# Patient Record
Sex: FEMALE | Race: WHITE | NOT HISPANIC OR LATINO | Employment: OTHER | URBAN - METROPOLITAN AREA
[De-identification: names, ages, dates, MRNs, and addresses within clinical notes are randomized per-mention and may not be internally consistent; named-entity substitution may affect disease eponyms.]

---

## 2017-02-01 ENCOUNTER — OFFICE VISIT (OUTPATIENT)
Dept: INTERNAL MEDICINE | Facility: CLINIC | Age: 77
End: 2017-02-01
Payer: COMMERCIAL

## 2017-02-01 VITALS
OXYGEN SATURATION: 97 % | DIASTOLIC BLOOD PRESSURE: 70 MMHG | TEMPERATURE: 96.7 F | SYSTOLIC BLOOD PRESSURE: 130 MMHG | BODY MASS INDEX: 27.22 KG/M2 | HEART RATE: 95 BPM | WEIGHT: 144 LBS

## 2017-02-01 DIAGNOSIS — Z01.818 PREOP GENERAL PHYSICAL EXAM: Primary | ICD-10-CM

## 2017-02-01 PROCEDURE — 99214 OFFICE O/P EST MOD 30 MIN: CPT | Performed by: INTERNAL MEDICINE

## 2017-02-01 PROCEDURE — 36415 COLL VENOUS BLD VENIPUNCTURE: CPT | Performed by: INTERNAL MEDICINE

## 2017-02-01 PROCEDURE — 84132 ASSAY OF SERUM POTASSIUM: CPT | Performed by: INTERNAL MEDICINE

## 2017-02-01 NOTE — PROGRESS NOTES
[place] [address]  [facility] [address]  [phone]  Dept: [phone]    PRE-OP EVALUATION:  Today's date: 2017    Sophia Olivarez (: 1940) presents for pre-operative evaluation assessment as requested by Dr. [place] Coyne.  She requires evaluation and anesthesia risk assessment prior to undergoing surgery/procedure for treatment of right eye .  Proposed procedure: Rt eye Yag Lasik capsulotomy     Date of Surgery/ Procedure: 17  Time of Surgery/ Procedure:   Hospital/Surgical Facility: [facility]  Fax number for surgical facility: [phone]  Primary Physician: Monica Sultana  Type of Anesthesia Anticipated: Local with MAC    Patient has a Health Care Directive or Living Will:  YES     1. NO - Do you have a history of heart attack, stroke, stent, bypass or surgery on an artery in the head, neck, heart or legs?  2. NO - Do you ever have any pain or discomfort in your chest?  3. NO - Do you have a history of  Heart Failure?  4. NO - Are you troubled by shortness of breath when: walking on the level, up a slight hill or at night?  5. NO - Do you currently have a cold, bronchitis or other respiratory infection?  6. NO - Do you have a cough, shortness of breath or wheezing?  7. NO - Do you sometimes get pains in the calves of your legs when you walk?  8. NO - Do you or anyone in your family have previous history of blood clots?  9. NO - Do you or does anyone in your family have a serious bleeding problem such as prolonged bleeding following surgeries or cuts?  10. NO - Have you ever had problems with anemia or been told to take iron pills?  11. NO - Have you had any abnormal blood loss such as black, tarry or bloody stools, or abnormal vaginal bleeding?  12. NO - Have you ever had a blood transfusion?  13. NO - Have you or any of your relatives ever had problems with anesthesia?  14. NO - Do you have sleep apnea, excessive  snoring or daytime drowsiness?  15. NO - Do you have any prosthetic heart valves?  16. NO - Do you have prosthetic joints?  17. NO - Is there any chance that you may be pregnant?      HPI:                                                      See problem list for active medical problems.  Problems all longstanding and stable, except as noted/documented.  See ROS for pertinent symptoms related to these conditions.                                                                                                  .    MEDICAL HISTORY:                                                      Patient Active Problem List    Diagnosis Date Noted     Gastritis, presence of bleeding unspecified, unspecified chronicity, unspecified gastritis type 12/02/2016     Priority: Medium     Multiple thyroid nodules 11/28/2016     Priority: Medium     Irritable bowel syndrome with diarrhea 11/28/2016     Priority: Medium     Osteoporosis 11/28/2016     Priority: Medium     Gastroesophageal reflux disease, esophagitis presence not specified 11/28/2016     Priority: Medium     Essential hypertension 05/26/2015     Priority: Medium     NONSPECIFIC MEDICAL HISTORY      Priority: Medium     needs annual pelvic exam per Obgyn        Uterine cancer (H) 05/23/2014     Priority: Medium     Nocturnal muscle cramps 05/29/2012     Priority: Medium     Advanced directives, counseling/discussion 05/10/2012     Priority: Medium     Patient states has Advance Directive and will bring in a copy to clinic.       HYPERLIPIDEMIA LDL GOAL <130 10/31/2010     Priority: Medium     Benign neoplasm of skin      Priority: Medium     sees derm.  Problem list name updated by automated process. Provider to review       Generalized osteoarthrosis, unspecified site 09/16/2003     Priority: Medium     Dyspepsia and other specified disorders of function of stomach 03/10/2003     Priority: Medium     Hyperlipidemia 03/10/2003     Priority: Medium     Problem list  name updated by automated process. Provider to review       Calculus of kidney 03/10/2003     Priority: Medium     Disorder of bone and cartilage 03/10/2003     Priority: Medium     Problem list name updated by automated process. Provider to review       Nontoxic multinodular goiter 03/10/2003     Priority: Medium          Past Medical History   Diagnosis Date     Dyspepsia and other specified disorders of function of stomach      dyspepsia     Other and unspecified hyperlipidemia      Hematuria      microscopic     Calculus of kidney      3 passed spont.     Disorder of bone and cartilage, unspecified      osteopenia     Nontoxic multinodular goiter      sees endocrine     Myalgia and myositis, unspecified      Generalized osteoarthrosis, unspecified site      Chronic subinvolution of uterus      Malignant neoplasm of corpus uteri, except isthmus (H)      surgery and radiation rx.     Benign neoplasm of skin, site unspecified      sees derm.     HTN (hypertension)      NONSPECIFIC MEDICAL HISTORY      needs annual pelvic exam per Obgyn      NONSPECIFIC MEDICAL HISTORY      radiation induced diarrhea- takes Lomotil prn per oncologist.      H/O thyroid nodule      f/u with endocrinologist       Past Surgical History   Procedure Laterality Date     C nonspecific procedure  1992     Cholecystectomy. abstracted 6/24/02.     C nonspecific procedure       Thyroidectomy. abstracted 6/24/02.     C nonspecific procedure       Dilatation & Curettage X 2. abstracted 6/24/02.     C nonspecific procedure       Cystoscopy. abstracted 6/24/02.     C nonspecific procedure       hysterctomy for uterine cancer.     Hc colonoscopy thru stoma, diagnostic  10/2007     due q 5 yrs         Current Outpatient Prescriptions   Medication Sig Dispense Refill     lisinopril (PRINIVIL,ZESTRIL) 5 MG tablet Take 1 tablet (5 mg) by mouth daily 90 tablet 3     Cholecalciferol (VITAMIN D3 PO) Take 2,000 Units by mouth daily        Cyanocobalamin ([employer]S VITAMIN B12) 2000 MCG TABS        IBANdronate (BONIVA) 150 MG tablet Take 150 mg by mouth every 30 days       Ascorbic Acid (VITAMIN C CR) 1000 MG TBCR        simvastatin (ZOCOR) 10 MG tablet Take 1 tablet (10 mg) by mouth At Bedtime at bedtime. 90 tablet 1     hydrochlorothiazide (HYDRODIURIL) 25 MG tablet Take 1 tablet (25 mg) by mouth daily 90 tablet 1     diphenoxylate-atropine (LOMOTIL) 2.5-0.025 MG per tablet Take 1 tablet by mouth 2 times daily 90 tablet 1     Multiple Vitamins-Minerals (MULTIVITAMIN & MINERAL PO) Take 1 tablet by mouth daily       omega-3 fatty acids 1200 MG capsule Take 1 capsule by mouth daily 90 capsule      aspirin 81 MG tablet Take by mouth as needed        CALCIUM 600 + D 600-200 MG-UNIT OR TABS 1220 of calcium, 600 of D 3 MONTHS 1 YEAR     loperamide (IMODIUM A-D) 2 MG tablet Take 2 mg by mouth as needed for diarrhea         OTC products: None, except as noted above      Allergies   Allergen Reactions     Atorvastatin Calcium      lipitor, myalgia, zocor     Codeine Sulfate      Difficulty breathing     Epidural Tray      Vomiting and diarrhea     Magnesium Citrate Diarrhea     Vomiting       Meperidine Hcl      demerol GI     Questran [Cholestyramine] Nausea        Latex Allergy: NO      Social History   Substance Use Topics     Smoking status: Never Smoker      Smokeless tobacco: Never Used     Alcohol Use: No     History   Drug Use No       REVIEW OF SYSTEMS:                                                    C: NEGATIVE for fever, chills, change in weight  I: NEGATIVE for worrisome rashes, moles or lesions  E: NEGATIVE for vision changes or irritation  E/M: NEGATIVE for ear, mouth and throat problems  R: NEGATIVE for significant cough or SOB  B: NEGATIVE for masses, tenderness or discharge  CV: NEGATIVE for chest pain, palpitations or peripheral edema  GI: NEGATIVE for nausea, abdominal pain, heartburn, or change in bowel habits  : NEGATIVE for frequency,  dysuria, or hematuria  M: NEGATIVE for significant arthralgias or myalgia  N: NEGATIVE for weakness, dizziness or paresthesias  E: NEGATIVE for temperature intolerance, skin/hair changes  H: NEGATIVE for bleeding problems  P: NEGATIVE for changes in mood or affect    EXAM:                                                    /70 mmHg  Pulse 95  Temp(Src) 96.7  F (35.9  C) (Oral)  Wt 144 lb (65.318 kg)  SpO2 97%    GENERAL APPEARANCE: healthy, alert and no distress     EYES: EOMI,- PERRL     HENT: ear canals and TM's normal and nose and mouth without ulcers or lesions     NECK: no adenopathy, no asymmetry, masses, or scars and thyroid normal to palpation     RESP: lungs clear to auscultation - no rales, rhonchi or wheezes     CV: regular rates and rhythm, normal S1 S2, no S3 or S4 and no murmur, click or rub -     ABDOMEN:  soft, nontender, no HSM or masses and bowel sounds normal     MS: extremities normal- no gross deformities noted, no evidence of inflammation in joints, FROM in all extremities.       NEURO: Normal strength and tone, sensory exam grossly normal, mentation intact and speech normal     PSYCH: mentation appears normal. and affect normal/bright       DIAGNOSTICS:                                                    No labs or EKG required for low risk surgery (cataract, skin procedure, breast biopsy, etc)  Potassium; pending       Recent Labs   Lab Test  11/28/16   0856  05/27/16   0850 05/20/16  05/26/15   0838   02/26/15   0342  05/15/14   HGB   --   12.3   --   12.7   < >  12.8   < >   --    PLT   --   256   --    --    --   226   < >   --    NA  140  138   --   138   < >  129*   < >   --    POTASSIUM  3.6  3.7   --   3.7   < >  3.5   < >   --    CR  0.55  0.55   --   0.61   --   0.49*   < >   --    A1C   --    --   5.3   --    --    --    --   5.9    < > = values in this interval not displayed.        IMPRESSION:                                                        (Z01.818) Preop general  physical exam  (primary encounter diagnosis)  Comment:Rt eye Yag Lasik capsulotomy   Plan: Potassium            The proposed surgical procedure is considered LOW risk.    REVISED CARDIAC RISK INDEX  The patient has the following serious cardiovascular risks for perioperative complications such as (MI, PE, VFib and 3  AV Block):  No serious cardiac risks    The patient has the following additional risks for perioperative complications:  No identified additional risks      ICD-10-CM    1. Preop general physical exam Z01.818 Potassium       RECOMMENDATIONS:                                                          Anticoagulant or Antiplatelet Medication Use  ASPIRIN: Discontinue ASA 7  days prior to procedure to reduce bleeding risk.  It should be resumed post-operatively.  NSAIDS: Stop  3 days prior to surgery  Instructions given on all other meds          Signed Electronically by: Monica Sultana MD    Copy of this evaluation report is provided to requesting physician.    [facility] Preop Guidelines

## 2017-02-01 NOTE — NURSING NOTE
"Chief Complaint   Patient presents with     Pre-Op Exam       Initial /70 mmHg  Pulse 95  Temp(Src) 96.7  F (35.9  C) (Oral)  Wt 144 lb (65.318 kg)  SpO2 97% Estimated body mass index is 27.22 kg/(m^2) as calculated from the following:    Height as of 11/28/16: 5' 1\" (1.549 m).    Weight as of this encounter: 144 lb (65.318 kg).  BP completed using cuff size: large    "

## 2017-02-02 LAB — POTASSIUM SERPL-SCNC: 3.9 MMOL/L (ref 3.4–5.3)

## 2017-02-06 ENCOUNTER — TRANSFERRED RECORDS (OUTPATIENT)
Dept: HEALTH INFORMATION MANAGEMENT | Facility: CLINIC | Age: 77
End: 2017-02-06

## 2017-04-04 DIAGNOSIS — M81.0 OSTEOPOROSIS: Primary | ICD-10-CM

## 2017-04-04 RX ORDER — IBANDRONATE SODIUM 150 MG/1
150 TABLET, FILM COATED ORAL
Qty: 3 TABLET | Refills: 1 | Status: SHIPPED | OUTPATIENT
Start: 2017-04-04 | End: 2017-05-30

## 2017-04-04 NOTE — TELEPHONE ENCOUNTER
"Marianar-is pt still taking? It was taken off med list at one point, then put back on stating \"pt reported\". Should I call pt?  "

## 2017-04-04 NOTE — TELEPHONE ENCOUNTER
Ibandronate    Last Written Prescription Date:   Last Fill Quantity: , # refills:   Last Office Visit with G, P or [facility] prescribing provider: 2/1/17--pt reported  Next 5 appointments (look out 90 days)     May 30, 2017  8:20 AM CDT   PHYSICAL with Monica Sultana MD   [facility]    [address] [facility] [id number] [phone]                   DEXA Scan:  Last order of DX HIP/PELVIS/SPINE was found on 6/5/2013 from Results Only on 6/5/2013     Last order of DX HIP/PELVIS/SPINE W LAT FRACTION ANALYSIS was found on 2/15/2016 from Radiant Appointment on 2/15/2016       Creatinine   Date Value Ref Range Status   11/28/2016 0.55 0.52 - 1.04 mg/dL Final

## 2017-04-29 DIAGNOSIS — E78.5 HYPERLIPIDEMIA LDL GOAL <130: ICD-10-CM

## 2017-05-01 ENCOUNTER — HOSPITAL ENCOUNTER (OUTPATIENT)
Dept: ULTRASOUND IMAGING | Facility: CLINIC | Age: 77
Discharge: HOME | End: 2017-05-01
Attending: INTERNAL MEDICINE | Admitting: INTERNAL MEDICINE
Payer: COMMERCIAL

## 2017-05-01 ENCOUNTER — TRANSFERRED RECORDS (OUTPATIENT)
Dept: HEALTH INFORMATION MANAGEMENT | Facility: CLINIC | Age: 77
End: 2017-05-01

## 2017-05-01 DIAGNOSIS — E04.2 MULTINODULAR GOITER: ICD-10-CM

## 2017-05-01 PROCEDURE — 76536 US EXAM OF HEAD AND NECK: CPT

## 2017-05-01 RX ORDER — SIMVASTATIN 10 MG
TABLET ORAL
Qty: 30 TABLET | Refills: 0 | Status: SHIPPED | OUTPATIENT
Start: 2017-05-01 | End: 2017-05-30

## 2017-05-30 ENCOUNTER — OFFICE VISIT (OUTPATIENT)
Dept: INTERNAL MEDICINE | Facility: CLINIC | Age: 77
End: 2017-05-30
Payer: COMMERCIAL

## 2017-05-30 ENCOUNTER — RADIANT APPOINTMENT (OUTPATIENT)
Dept: GENERAL RADIOLOGY | Facility: CLINIC | Age: 77
End: 2017-05-30
Attending: INTERNAL MEDICINE
Payer: COMMERCIAL

## 2017-05-30 VITALS
TEMPERATURE: 98.2 F | HEART RATE: 78 BPM | HEIGHT: 61 IN | SYSTOLIC BLOOD PRESSURE: 110 MMHG | DIASTOLIC BLOOD PRESSURE: 70 MMHG | BODY MASS INDEX: 26.81 KG/M2 | WEIGHT: 142 LBS | OXYGEN SATURATION: 98 %

## 2017-05-30 DIAGNOSIS — E78.2 MIXED HYPERLIPIDEMIA: ICD-10-CM

## 2017-05-30 DIAGNOSIS — C55 MALIGNANT NEOPLASM OF UTERUS, UNSPECIFIED SITE (H): ICD-10-CM

## 2017-05-30 DIAGNOSIS — M81.0 OSTEOPOROSIS: ICD-10-CM

## 2017-05-30 DIAGNOSIS — Z00.00 ENCOUNTER FOR ROUTINE ADULT HEALTH EXAMINATION WITHOUT ABNORMAL FINDINGS: Primary | ICD-10-CM

## 2017-05-30 DIAGNOSIS — Z00.00 ENCOUNTER FOR ROUTINE ADULT HEALTH EXAMINATION WITHOUT ABNORMAL FINDINGS: ICD-10-CM

## 2017-05-30 DIAGNOSIS — I10 ESSENTIAL HYPERTENSION: ICD-10-CM

## 2017-05-30 LAB
ALBUMIN SERPL-MCNC: 4 G/DL (ref 3.4–5)
ALP SERPL-CCNC: 77 U/L (ref 40–150)
ALT SERPL W P-5'-P-CCNC: 30 U/L (ref 0–50)
ANION GAP SERPL CALCULATED.3IONS-SCNC: 12 MMOL/L (ref 3–14)
AST SERPL W P-5'-P-CCNC: 23 U/L (ref 0–45)
BILIRUB SERPL-MCNC: 0.8 MG/DL (ref 0.2–1.3)
BUN SERPL-MCNC: 12 MG/DL (ref 7–30)
CALCIUM SERPL-MCNC: 8.8 MG/DL (ref 8.5–10.1)
CANCER AG125 SERPL-ACNC: NORMAL U/ML (ref 0–30)
CHLORIDE SERPL-SCNC: 101 MMOL/L (ref 94–109)
CHOLEST SERPL-MCNC: 182 MG/DL
CO2 SERPL-SCNC: 26 MMOL/L (ref 20–32)
CREAT SERPL-MCNC: 0.57 MG/DL (ref 0.52–1.04)
GFR SERPL CREATININE-BSD FRML MDRD: NORMAL ML/MIN/1.7M2
GLUCOSE SERPL-MCNC: 96 MG/DL (ref 70–99)
HDLC SERPL-MCNC: 70 MG/DL
HGB BLD-MCNC: 12.6 G/DL (ref 11.7–15.7)
LDLC SERPL CALC-MCNC: 89 MG/DL
MAGNESIUM SERPL-MCNC: 2.4 MG/DL (ref 1.6–2.3)
NONHDLC SERPL-MCNC: 112 MG/DL
POTASSIUM SERPL-SCNC: 3.4 MMOL/L (ref 3.4–5.3)
PROT SERPL-MCNC: 7.7 G/DL (ref 6.8–8.8)
SODIUM SERPL-SCNC: 139 MMOL/L (ref 133–144)
TRIGL SERPL-MCNC: 116 MG/DL
TSH SERPL DL<=0.005 MIU/L-ACNC: 1.03 MU/L (ref 0.4–4)

## 2017-05-30 PROCEDURE — 99397 PER PM REEVAL EST PAT 65+ YR: CPT | Performed by: INTERNAL MEDICINE

## 2017-05-30 PROCEDURE — 80053 COMPREHEN METABOLIC PANEL: CPT | Performed by: INTERNAL MEDICINE

## 2017-05-30 PROCEDURE — 83735 ASSAY OF MAGNESIUM: CPT | Performed by: INTERNAL MEDICINE

## 2017-05-30 PROCEDURE — 80061 LIPID PANEL: CPT | Performed by: INTERNAL MEDICINE

## 2017-05-30 PROCEDURE — 85018 HEMOGLOBIN: CPT | Performed by: INTERNAL MEDICINE

## 2017-05-30 PROCEDURE — 71020 XR CHEST 2 VW: CPT

## 2017-05-30 PROCEDURE — 36415 COLL VENOUS BLD VENIPUNCTURE: CPT | Performed by: INTERNAL MEDICINE

## 2017-05-30 PROCEDURE — 84443 ASSAY THYROID STIM HORMONE: CPT | Performed by: INTERNAL MEDICINE

## 2017-05-30 PROCEDURE — 86304 IMMUNOASSAY TUMOR CA 125: CPT | Performed by: INTERNAL MEDICINE

## 2017-05-30 RX ORDER — IBANDRONATE SODIUM 150 MG/1
150 TABLET, FILM COATED ORAL
Qty: 3 TABLET | Refills: 3 | Status: SHIPPED | OUTPATIENT
Start: 2017-05-30 | End: 2018-06-01

## 2017-05-30 RX ORDER — LISINOPRIL 5 MG/1
5 TABLET ORAL DAILY
Qty: 90 TABLET | Refills: 3 | Status: SHIPPED | OUTPATIENT
Start: 2017-05-30 | End: 2018-06-01

## 2017-05-30 RX ORDER — SIMVASTATIN 10 MG
TABLET ORAL
Qty: 90 TABLET | Refills: 3 | Status: SHIPPED | OUTPATIENT
Start: 2017-05-30 | End: 2018-05-21

## 2017-05-30 NOTE — PROGRESS NOTES
SUBJECTIVE:                                                            Sophia Olivarez is a 76 year old female who presents for Preventive Visit.      Are you in the first 12 months of your Medicare coverage?  No    Physical   Annual:     Getting at least 3 servings of Calcium per day::  Yes (unsure)    Bi-annual eye exam::  Yes    Dental care twice a year::  Yes    Sleep apnea or symptoms of sleep apnea::  None    Diet::  Regular (no restrictions)    Frequency of exercise::  4-5 days/week    Duration of exercise::  30-45 minutes    Taking medications regularly::  Yes    Medication side effects::  None    Additional concerns today::  YES (see note)    Pt is requesting, Ca 125,  and magnesium check   Pt is requesting chest xray , says she had radon exposure at home and they are working on it.    COGNITIVE SCREEN  1) Repeat 3 items (Banana, Sunrise, Chair)    2) Clock draw: NORMAL  3) 3 item recall: Recalls 3 objects  Results: 3 items recalled: COGNITIVE IMPAIRMENT LESS LIKELY    Mini-CogTM Copyright [employer]. Licensed by the author for use in [facility]; reprinted with permission (thomas@[facility]). All rights reserved.        Reviewed and updated as needed this visit by clinical staff         Reviewed and updated as needed this visit by Provider          Past Medical History:   Diagnosis Date     Benign neoplasm of skin, site unspecified     sees derm.     Calculus of kidney     3 passed spont.     Chronic subinvolution of uterus      Disorder of bone and cartilage, unspecified     osteopenia     Dyspepsia and other specified disorders of function of stomach     dyspepsia     Generalized osteoarthrosis, unspecified site      H/O thyroid nodule     f/u with endocrinologist     Hematuria     microscopic     HTN (hypertension)      Malignant neoplasm of corpus uteri, except isthmus (H)     surgery and radiation rx.     Myalgia and myositis, unspecified      NONSPECIFIC MEDICAL HISTORY     needs annual pelvic  exam per Obgyn      NONSPECIFIC MEDICAL HISTORY     radiation induced diarrhea- takes Lomotil prn per oncologist.      Nontoxic multinodular goiter     sees endocrine     Other and unspecified hyperlipidemia        Past Surgical History:   Procedure Laterality Date     ABDOMEN SURGERY  2005    uterine cancer Hysterectiomy     BIOPSY      many times from nodules from thyroid     C NONSPECIFIC PROCEDURE  1992    Cholecystectomy. abstracted 6/24/02.     C NONSPECIFIC PROCEDURE      Thyroidectomy. abstracted 6/24/02.     C NONSPECIFIC PROCEDURE      Dilatation & Curettage X 2. abstracted 6/24/02.     C NONSPECIFIC PROCEDURE      Cystoscopy. abstracted 6/24/02.     C NONSPECIFIC PROCEDURE      hysterctomy for uterine cancer.     CHOLECYSTECTOMY  1992     GYN SURGERY  2005    uterine cancer     HC COLONOSCOPY THRU STOMA, DIAGNOSTIC  10/2007    due q 5 yrs     HEAD & NECK SURGERY      right thyroid taken out       Current Outpatient Prescriptions   Medication Sig Dispense Refill     simvastatin (ZOCOR) 10 MG tablet TAKE ONE TABLET BY MOUTH EVERY NIGHT AT BEDTIME 30 tablet 0     IBANdronate (BONIVA) 150 MG tablet Take 1 tablet (150 mg) by mouth every 30 days 3 tablet 1     lisinopril (PRINIVIL,ZESTRIL) 5 MG tablet Take 1 tablet (5 mg) by mouth daily 90 tablet 3     Cholecalciferol (VITAMIN D3 PO) Take 2,000 Units by mouth daily       Cyanocobalamin (CVS VITAMIN B12) 2000 MCG TABS        Ascorbic Acid (VITAMIN C CR) 1000 MG TBCR        hydrochlorothiazide (HYDRODIURIL) 25 MG tablet Take 1 tablet (25 mg) by mouth daily 90 tablet 1     diphenoxylate-atropine (LOMOTIL) 2.5-0.025 MG per tablet Take 1 tablet by mouth 2 times daily 90 tablet 1     loperamide (IMODIUM A-D) 2 MG tablet Take 2 mg by mouth as needed for diarrhea       Multiple Vitamins-Minerals (MULTIVITAMIN & MINERAL PO) Take 1 tablet by mouth daily       omega-3 fatty acids 1200 MG capsule Take 1 capsule by mouth daily 90 capsule      aspirin 81 MG tablet  Take by mouth as needed        CALCIUM 600 + D 600-200 MG-UNIT OR TABS 1220 of calcium, 600 of D 3 MONTHS 1 YEAR       Family History   Problem Relation Age of Onset     Family History Negative Mother      Other Cancer Mother      OSTEOPOROSIS Mother      Family History Negative Father      Other Cancer Father      Neurologic Disorder Sister      MS     DIABETES Brother      also, colon CA, colon surgery     DIABETES Sister      Hypertension Sister      Hyperlipidemia Sister      Anxiety Disorder Sister      MENTAL ILLNESS Sister      bi polar     Obesity Sister      from some pills too     DIABETES Brother      Colon Cancer Brother      Other Cancer Brother      Anxiety Disorder Brother      MENTAL ILLNESS Brother      schitzophenia     Obesity Brother      from pills too       Social History   Substance Use Topics     Smoking status: Never Smoker     Smokeless tobacco: Never Used     Alcohol use No       The patient does not drink >3 drinks per day nor >7 drinks per week.        Today's PHQ-2 Score:   PHQ-2 ( 1999 [employer]) 5/30/2017   Q1: Little interest or pleasure in doing things 0   Q2: Feeling down, depressed or hopeless 0   PHQ-2 Score 0   Q1: Little interest or pleasure in doing things -   Q2: Feeling down, depressed or hopeless -   PHQ-2 Score -       Do you feel safe in your environment - Yes    Do you have a Health Care Directive?: Yes: Patient states has Advance Directive and will bring in a copy to clinic.    Current providers sharing in care for this patient include:   Patient Care Team:  Monica Sultana MD as PCP - General      Hearing impairment: No    Ability to successfully perform activities of daily living: Yes, no assistance needed     Fall risk:       Home safety:  none identified      The following health maintenance items are reviewed in Epic and correct as of today:  Health Maintenance   Topic Date Due     ADVANCE DIRECTIVE PLANNING Q5 YRS  05/10/2017     FALL RISK ASSESSMENT   "05/27/2017     INFLUENZA VACCINE (SYSTEM ASSIGNED)  09/01/2017     TSH Q1 YEAR  11/28/2017     MAMMO SCREEN Q2 YR (SYSTEM ASSIGNED)  02/06/2019     TETANUS IMMUNIZATION (SYSTEM ASSIGNED)  03/23/2019     LIPID SCREEN Q5 YR FEMALE (SYSTEM ASSIGNED)  05/27/2021     COLON CANCER SCREEN (SYSTEM ASSIGNED)  05/21/2023     DEXA SCAN SCREENING (SYSTEM ASSIGNED)  Completed     PNEUMOCOCCAL  Completed             ROS:  C: NEGATIVE for fever, chills, change in weight  I: NEGATIVE for worrisome rashes, moles or lesions  E: NEGATIVE for vision changes or irritation  E/M: NEGATIVE for ear, mouth and throat problems  R: NEGATIVE for significant cough or SOB  B: NEGATIVE for masses, tenderness or discharge  CV: NEGATIVE for chest pain, palpitations or peripheral edema  GI: NEGATIVE for nausea, abdominal pain, heartburn, or change in bowel habits  : NEGATIVE for frequency, dysuria, or hematuria  M: NEGATIVE for significant arthralgias or myalgia  N: NEGATIVE for weakness, dizziness or paresthesias  E: NEGATIVE for temperature intolerance, skin/hair changes  H: NEGATIVE for bleeding problems  P: NEGATIVE for changes in mood or affect    Problem list, Medication list, Allergies, and Medical/Social/Surgical histories reviewed in [facility] and updated as appropriate.  OBJECTIVE:                                                            /70 (BP Location: Left arm, Cuff Size: Adult Regular)  Pulse 78  Temp 98.2  F (36.8  C) (Oral)  Ht 5' 1\" (1.549 m)  Wt 142 lb (64.4 kg)  SpO2 98%  Breastfeeding? No  BMI 26.83 kg/m2 Estimated body mass index is 27.21 kg/(m^2) as calculated from the following:    Height as of 11/28/16: 5' 1\" (1.549 m).    Weight as of 2/1/17: 144 lb (65.3 kg).  EXAM:   GENERAL APPEARANCE: healthy, alert and no distress  EYES: Eyes grossly normal to inspection, PERRL and conjunctivae and sclerae normal  HENT: ear canals and TM's normal, nose and mouth without ulcers or lesions, oropharynx clear and oral mucous " "membranes moist  NECK: no adenopathy, no asymmetry, masses, or scars and thyroid normal to palpation  RESP: lungs clear to auscultation - no rales, rhonchi or wheezes  BREAST: normal without masses, tenderness or nipple discharge and no palpable axillary masses or adenopathy  CV: regular rate and rhythm, normal S1 S2, no S3 or S4, no murmur, click or rub, no peripheral edema and peripheral pulses strong  ABDOMEN: soft, nontender, no hepatosplenomegaly, no masses and bowel sounds normal  ;,Vagina and vulva are normal;  no discharge is noted.  No Cervix normal. Adnexa without masses or tenderness.   MS: no musculoskeletal defects are noted and gait is age appropriate without ataxia  NEURO: Normal strength and tone, sensory exam grossly normal, mentation intact and speech normal  PSYCH: mentation appears normal and affect normal/bright    ASSESSMENT / PLAN:                                                                (Z00.00) Encounter for routine adult health examination without abnormal findings  (primary encounter diagnosis)  Plan: XR Chest 2 Views, Hemoglobin, Comprehensive         metabolic panel, TSH with free T4 reflex,         Magnesium            (C55) Malignant neoplasm of uterus, unspecified site (H)  Plan:             (E78.2) Mixed hyperlipidemia  Plan: on simvastatin, check Lipid panel reflex to direct LDL            (I10) Essential hypertension  Plan: BP stable. Continue lisinopril 5 mg and Hctz 25 mg .Advised to follow low salt diet and exercise and f/u in 6 mths.          End of Life Planning:  Patient currently has an advanced directive: Yes: Patient states has Advance Directive and will bring in a copy to clinic.      COUNSELING:  Reviewed preventive health counseling, as reflected in patient instructions       Regular exercise       Healthy diet/nutrition        Estimated body mass index is 26.83 kg/(m^2) as calculated from the following:    Height as of this encounter: 5' 1\" (1.549 m).    " Weight as of this encounter: 142 lb (64.4 kg).     reports that she has never smoked. She has never used smokeless tobacco.      Appropriate preventive services were discussed with this patient, including applicable screening as appropriate for cardiovascular disease, diabetes, osteopenia/osteoporosis, and glaucoma.  As appropriate for age/gender, discussed screening for colorectal cancer, prostate cancer, breast cancer, and cervical cancer. Checklist reviewing preventive services available has been given to the patient.    Reviewed patients plan of care and provided an AVS. The Basic Care Plan (routine screening as documented in Health Maintenance) for Sophia meets the Care Plan requirement. This Care Plan has been established and reviewed with the Patient.    Counseling Resources:  ATP IV Guidelines  Pooled Cohorts Equation Calculator  Breast Cancer Risk Calculator  FRAX Risk Assessment  ICSI Preventive Guidelines  Dietary Guidelines for Americans, 2010  USDA's MyPlate  ASA Prophylaxis  Lung CA Screening    Monica Sultana MD  [facility]

## 2017-05-30 NOTE — MR AVS SNAPSHOT
After Visit Summary   5/30/2017    Sophia Olivarez    MRN: [id number]           Patient Information     Date Of Birth          1940        Visit Information        Provider Department      5/30/2017 8:20 AM Monica Sultana MD [facility]        Today's Diagnoses     Encounter for routine adult health examination without abnormal findings    -  1    Malignant neoplasm of uterus, unspecified site (H)        Mixed hyperlipidemia        Essential hypertension        Osteoporosis           Follow-ups after your visit        Who to contact     If you have questions or need follow up information about today's clinic visit or your schedule please contact [facility] directly at [phone].  Normal or non-critical lab and imaging results will be communicated to you by MyChart, letter or phone within 4 business days after the clinic has received the results. If you do not hear from us within 7 days, please contact the clinic through MyChart or phone. If you have a critical or abnormal lab result, we will notify you by phone as soon as possible.  Submit refill requests through Stream[employer] or call your pharmacy and they will forward the refill request to us. Please allow 3 business days for your refill to be completed.          Additional Information About Your Visit        MyChart Information     Stream5 gives you secure access to your electronic health record. If you see a primary care provider, you can also send messages to your care team and make appointments. If you have questions, please call your primary care clinic.  If you do not have a primary care provider, please call [phone] and they will assist you.        Care EveryWhere ID     This is your Care EveryWhere ID. This could be used by other organizations to access your [place] medical records  [id number]        Your Vitals Were     Pulse Temperature Height Pulse Oximetry Breastfeeding? BMI (Body  "Mass Index)    78 98.2  F (36.8  C) (Oral) 5' 1\" (1.549 m) 98% No 26.83 kg/m2       Blood Pressure from Last 3 Encounters:   05/30/17 110/70   02/01/17 130/70   11/28/16 120/68    Weight from Last 3 Encounters:   05/30/17 142 lb (64.4 kg)   02/01/17 144 lb (65.3 kg)   11/28/16 145 lb (65.8 kg)              We Performed the Following          Comprehensive metabolic panel     Hemoglobin     Lipid panel reflex to direct LDL     Magnesium     TSH with free T4 reflex          Today's Medication Changes          These changes are accurate as of: 5/30/17 12:24 PM.  If you have any questions, ask your nurse or doctor.               These medicines have changed or have updated prescriptions.        Dose/Directions    simvastatin 10 MG tablet   Commonly known as:  ZOCOR   This may have changed:  See the new instructions.   Used for:  Mixed hyperlipidemia   Changed by:  Monica Sultana MD        TAKE ONE TABLET BY MOUTH EVERY NIGHT AT BEDTIME   Quantity:  90 tablet   Refills:  3         Stop taking these medicines if you haven't already. Please contact your care team if you have questions.     CALCIUM 600 + D 600-200 MG-UNIT Tabs   Stopped by:  Monica Sultana MD                Where to get your medicines      These medications were sent to [address]  [address] [place] [address]     Phone:  [phone]     IBANdronate 150 MG tablet    lisinopril 5 MG tablet    simvastatin 10 MG tablet                Primary Care Provider Office Phone # Fax #    Monica Sultana [phone]       [facility] [address] [address]        Thank you!     Thank you for choosing [facility]  for your care. Our goal is always to provide you with excellent care. Hearing back from our patients is one way we can continue to improve our services. Please take a few minutes to complete the written " survey that you may receive in the mail after your visit with us. Thank you!             Your Updated Medication List - Protect others around you: Learn how to safely use, store and throw away your medicines at www.disposemymeds.org.          This list is accurate as of: 5/30/17 12:24 PM.  Always use your most recent med list.                   Brand Name Dispense Instructions for use    aspirin 81 MG tablet      Take by mouth as needed       CALCIUM 1200+D3 PO      Take by mouth daily       [employer] VITAMIN B12 2000 MCG Tabs   Generic drug:  Cyanocobalamin          diphenoxylate-atropine 2.5-0.025 MG per tablet    LOMOTIL    90 tablet    Take 1 tablet by mouth 2 times daily       hydrochlorothiazide 25 MG tablet    HYDRODIURIL    90 tablet    Take 1 tablet (25 mg) by mouth daily       IBANdronate 150 MG tablet    BONIVA    3 tablet    Take 1 tablet (150 mg) by mouth every 30 days       lisinopril 5 MG tablet    PRINIVIL/ZESTRIL    90 tablet    Take 1 tablet (5 mg) by mouth daily       loperamide 2 MG tablet    IMODIUM A-D     Take 2 mg by mouth as needed for diarrhea       MULTIVITAMIN & MINERAL PO      Take 1 tablet by mouth daily       omega-3 fatty acids 1200 MG capsule     90 capsule    Take 1 capsule by mouth daily       simvastatin 10 MG tablet    ZOCOR    90 tablet    TAKE ONE TABLET BY MOUTH EVERY NIGHT AT BEDTIME       vitamin C CR 1000 MG Tbcr          VITAMIN D3 PO      Take 2,000 Units by mouth daily

## 2017-05-30 NOTE — NURSING NOTE
"Chief Complaint   Patient presents with     Wellness Visit     fasting, pain in right flank area/ribs for 3 mos intermittent, requesting cxr for radon exposure in house       Initial /70 (BP Location: Left arm, Cuff Size: Adult Regular)  Pulse 78  Temp 98.2  F (36.8  C) (Oral)  Ht 5' 1\" (1.549 m)  Wt 142 lb (64.4 kg)  SpO2 98%  Breastfeeding? No  BMI 26.83 kg/m2 Estimated body mass index is 26.83 kg/(m^2) as calculated from the following:    Height as of this encounter: 5' 1\" (1.549 m).    Weight as of this encounter: 142 lb (64.4 kg).  Medication Reconciliation: complete   Bettye Dixon CMA      "

## 2017-05-31 DIAGNOSIS — E78.5 HYPERLIPIDEMIA LDL GOAL <130: ICD-10-CM

## 2017-06-02 RX ORDER — SIMVASTATIN 10 MG
TABLET ORAL
Qty: 30 TABLET | Refills: 0 | OUTPATIENT
Start: 2017-06-02

## 2017-06-06 ENCOUNTER — TRANSFERRED RECORDS (OUTPATIENT)
Dept: HEALTH INFORMATION MANAGEMENT | Facility: CLINIC | Age: 77
End: 2017-06-06

## 2017-06-08 ENCOUNTER — TELEPHONE (OUTPATIENT)
Dept: INTERNAL MEDICINE | Facility: CLINIC | Age: 77
End: 2017-06-08

## 2017-06-08 NOTE — TELEPHONE ENCOUNTER
"Pt called. Stated she received below message. Pt stated her Magnesium was \"1 point over\", and she does not even take a magnesium supplement. I asked pt if she is taking any supplements that contain Magnesium? Pt got upset and said \"it does not tell you on the bottle\". Relayed to pt that is should, pt got upset and said- Im not checking every dam bottle, and I am not going to stop taking everything. Relayed I will let Rd know.       Notes Recorded by Monica Sultana MD on 6/7/2017 at 2:29 PM  I have reviewed all lab results which are normal or stable except slightly high Magnesium levels.  Please discontinue any magnesium supplements and will repeat magnesium in 4 wks.  "

## 2017-06-12 ENCOUNTER — TELEPHONE (OUTPATIENT)
Dept: INTERNAL MEDICINE | Facility: CLINIC | Age: 77
End: 2017-06-12

## 2017-06-12 DIAGNOSIS — R10.11 RUQ ABDOMINAL PAIN: Primary | ICD-10-CM

## 2017-06-12 DIAGNOSIS — I10 ESSENTIAL HYPERTENSION: ICD-10-CM

## 2017-06-12 NOTE — TELEPHONE ENCOUNTER
Pt called and reached about scheduling US. Gave her the scheduling main number.  She will call to get scheduled.    Monroe GIANG RN

## 2017-06-12 NOTE — TELEPHONE ENCOUNTER
Pt is calling in about some RUQ pain.  She states that she mentioned it to Dr. Sultana but then didn't remember coming back to talk about it and some testing that could be done for it.    Pain is on right side right at the bottom of rib cage.  She states she can feel it daily, some days worse in am.  She states that she is eating and drinking normal and has not lost weight.  This pain has been going on for 3 months or so.    She has had kidney stones in the past, but no issue with spleen or pancreas or gallbladder.  Pt was thinking doing a US would be good.    Please advise  Monroe GIANG RN

## 2017-06-13 RX ORDER — HYDROCHLOROTHIAZIDE 25 MG/1
TABLET ORAL
Qty: 90 TABLET | Refills: 0 | Status: SHIPPED | OUTPATIENT
Start: 2017-06-13 | End: 2017-09-20

## 2017-06-13 NOTE — TELEPHONE ENCOUNTER
HCTZ      Last Written Prescription Date: 11/28/16  Last Fill Quantity: 90, # refills: 1  Last Office Visit with [facility], P or [facility] prescribing provider: 5/30/17    See 6/8 phone note-Per Rd-Noted, will rpt Magnesium in 4 wks       Potassium   Date Value Ref Range Status   05/30/2017 3.4 3.4 - 5.3 mmol/L Final     Creatinine   Date Value Ref Range Status   05/30/2017 0.57 0.52 - 1.04 mg/dL Final     BP Readings from Last 3 Encounters:   05/30/17 110/70   02/01/17 130/70   11/28/16 120/68

## 2017-06-16 ENCOUNTER — HOSPITAL ENCOUNTER (OUTPATIENT)
Dept: ULTRASOUND IMAGING | Facility: CLINIC | Age: 77
Discharge: HOME | End: 2017-06-16
Attending: INTERNAL MEDICINE | Admitting: INTERNAL MEDICINE
Payer: COMMERCIAL

## 2017-06-16 DIAGNOSIS — R10.11 RUQ ABDOMINAL PAIN: ICD-10-CM

## 2017-06-16 PROCEDURE — 76705 ECHO EXAM OF ABDOMEN: CPT

## 2017-06-20 ENCOUNTER — TRANSFERRED RECORDS (OUTPATIENT)
Dept: HEALTH INFORMATION MANAGEMENT | Facility: CLINIC | Age: 77
End: 2017-06-20

## 2017-06-20 LAB
GLUCOSE SERPL-MCNC: 93 MG/DL (ref 65–99)
HBA1C MFR BLD: 5.7 % (ref 4–6)
TSH SERPL-ACNC: 1.35 UIU/ML (ref 0.3–5)

## 2017-06-29 ENCOUNTER — TELEPHONE (OUTPATIENT)
Dept: INTERNAL MEDICINE | Facility: CLINIC | Age: 77
End: 2017-06-29

## 2017-06-29 DIAGNOSIS — E83.41 HIGH MAGNESIUM LEVELS: Primary | ICD-10-CM

## 2017-06-29 NOTE — TELEPHONE ENCOUNTER
Reason for call:  Order   Order or referral being requested: Magnesium   Reason for request: Pt states she needs magnesium tested again  Date needed: Lab appt made for Mon 7/3/17  Has the patient been seen by the PCP for this problem? YES    Additional comments: Pt states she was told to have retesting done in 4 weeks and it's almost been 4 weeks    Phone number to reach patient:  Cell number on file:    Telephone Information:   Mobile [phone]       Best Time:  anytime    Can we leave a detailed message on this number?  YES

## 2017-07-03 DIAGNOSIS — E83.41 HIGH MAGNESIUM LEVELS: ICD-10-CM

## 2017-07-03 LAB — MAGNESIUM SERPL-MCNC: 2.5 MG/DL (ref 1.6–2.3)

## 2017-07-03 PROCEDURE — 36415 COLL VENOUS BLD VENIPUNCTURE: CPT | Performed by: INTERNAL MEDICINE

## 2017-07-03 PROCEDURE — 83735 ASSAY OF MAGNESIUM: CPT | Performed by: INTERNAL MEDICINE

## 2017-07-11 ENCOUNTER — TELEPHONE (OUTPATIENT)
Dept: INTERNAL MEDICINE | Facility: CLINIC | Age: 77
End: 2017-07-11

## 2017-07-11 NOTE — TELEPHONE ENCOUNTER
Pt calls, states she's been having a high Mg level and recently had a re-check. Per 07/03 Mg level continues to be elevated. Pt is concerned and asking what the recommendations are in regards to this. Please advise, thanks.

## 2017-07-12 NOTE — TELEPHONE ENCOUNTER
Pl check with pt if she is taking any supplements with magnesium ,includign mg citrate . Per epic pt is on calcium-mag and vit D supplement, also her multivitamin will have magnesium. Please advise pt to stop all multivitamins, and combination calcium/mag/vit D pill.  She can take plain calcium supplement.   Will rpt magnesium after 2-3 wks of stopping all these supplements.

## 2017-07-12 NOTE — TELEPHONE ENCOUNTER
She stopped the Multivitamin about a week ago. her calcium pill doesn't have Mg in it. Updated med list.     She will repeat lab in 2-3 weeks.

## 2017-08-01 ENCOUNTER — TELEPHONE (OUTPATIENT)
Dept: INTERNAL MEDICINE | Facility: CLINIC | Age: 77
End: 2017-08-01

## 2017-08-01 DIAGNOSIS — E83.41 HIGH MAGNESIUM LEVELS: Primary | ICD-10-CM

## 2017-08-01 NOTE — TELEPHONE ENCOUNTER
"Pt calling.  Was trying to schedule lab only appt to recheck Mg level but no order in chart.    Per TE 7-11-17 from PCP:     \"Please advise pt to stop all multivitamins, and combination calcium/mag/vit D pill.  She can take plain calcium supplement.   Will rpt magnesium after 2-3 wks of stopping all these supplements.\"    Future order in chart.  Pt transferred to schedule lab only appt.    "

## 2017-08-14 DIAGNOSIS — E83.41 HIGH MAGNESIUM LEVELS: ICD-10-CM

## 2017-08-14 LAB — MAGNESIUM SERPL-MCNC: 2.2 MG/DL (ref 1.6–2.3)

## 2017-08-14 PROCEDURE — 36415 COLL VENOUS BLD VENIPUNCTURE: CPT | Performed by: INTERNAL MEDICINE

## 2017-08-14 PROCEDURE — 83735 ASSAY OF MAGNESIUM: CPT | Performed by: INTERNAL MEDICINE

## 2017-09-20 DIAGNOSIS — I10 ESSENTIAL HYPERTENSION: ICD-10-CM

## 2017-09-21 RX ORDER — HYDROCHLOROTHIAZIDE 25 MG/1
TABLET ORAL
Qty: 90 TABLET | Refills: 1 | Status: SHIPPED | OUTPATIENT
Start: 2017-09-21 | End: 2018-04-04

## 2017-09-21 NOTE — TELEPHONE ENCOUNTER
Hydrochlorothiazide 25 mg      Last Written Prescription Date: 6/13/17  Last Fill Quantity: 90, # refills: 0  Last Office Visit with [facility], P or [facility] prescribing provider: 5/3/17       Potassium   Date Value Ref Range Status   05/30/2017 3.4 3.4 - 5.3 mmol/L Final     Creatinine   Date Value Ref Range Status   05/30/2017 0.57 0.52 - 1.04 mg/dL Final     BP Readings from Last 3 Encounters:   05/30/17 110/70   02/01/17 130/70   11/28/16 120/68

## 2017-11-14 ENCOUNTER — HOSPITAL ENCOUNTER (EMERGENCY)
Facility: CLINIC | Age: 77
Discharge: HOME | End: 2017-11-14
Attending: EMERGENCY MEDICINE | Admitting: EMERGENCY MEDICINE
Payer: COMMERCIAL

## 2017-11-14 ENCOUNTER — NURSE TRIAGE (OUTPATIENT)
Dept: NURSING | Facility: CLINIC | Age: 77
End: 2017-11-14

## 2017-11-14 VITALS
DIASTOLIC BLOOD PRESSURE: 75 MMHG | TEMPERATURE: 98.4 F | RESPIRATION RATE: 16 BRPM | HEART RATE: 87 BPM | OXYGEN SATURATION: 95 % | SYSTOLIC BLOOD PRESSURE: 130 MMHG

## 2017-11-14 DIAGNOSIS — E87.1 HYPONATREMIA: ICD-10-CM

## 2017-11-14 DIAGNOSIS — R11.2 NAUSEA VOMITING AND DIARRHEA: ICD-10-CM

## 2017-11-14 DIAGNOSIS — R19.7 NAUSEA VOMITING AND DIARRHEA: ICD-10-CM

## 2017-11-14 LAB
ALBUMIN SERPL-MCNC: 3.2 G/DL (ref 3.4–5)
ALP SERPL-CCNC: 92 U/L (ref 40–150)
ALT SERPL W P-5'-P-CCNC: 34 U/L (ref 0–50)
ANION GAP SERPL CALCULATED.3IONS-SCNC: 8 MMOL/L (ref 3–14)
AST SERPL W P-5'-P-CCNC: 20 U/L (ref 0–45)
BASOPHILS # BLD AUTO: 0 10E9/L (ref 0–0.2)
BASOPHILS NFR BLD AUTO: 0.4 %
BILIRUB DIRECT SERPL-MCNC: 0.1 MG/DL (ref 0–0.2)
BILIRUB SERPL-MCNC: 0.8 MG/DL (ref 0.2–1.3)
BUN SERPL-MCNC: 9 MG/DL (ref 7–30)
CALCIUM SERPL-MCNC: 8.1 MG/DL (ref 8.5–10.1)
CHLORIDE SERPL-SCNC: 97 MMOL/L (ref 94–109)
CO2 SERPL-SCNC: 25 MMOL/L (ref 20–32)
CREAT SERPL-MCNC: 0.47 MG/DL (ref 0.52–1.04)
DIFFERENTIAL METHOD BLD: ABNORMAL
EOSINOPHIL # BLD AUTO: 0 10E9/L (ref 0–0.7)
EOSINOPHIL NFR BLD AUTO: 0 %
ERYTHROCYTE [DISTWIDTH] IN BLOOD BY AUTOMATED COUNT: 12.9 % (ref 10–15)
GFR SERPL CREATININE-BSD FRML MDRD: >90 ML/MIN/1.7M2
GLUCOSE SERPL-MCNC: 126 MG/DL (ref 70–99)
HCT VFR BLD AUTO: 36 % (ref 35–47)
HGB BLD-MCNC: 12.2 G/DL (ref 11.7–15.7)
IMM GRANULOCYTES # BLD: 0 10E9/L (ref 0–0.4)
IMM GRANULOCYTES NFR BLD: 0.4 %
LIPASE SERPL-CCNC: 87 U/L (ref 73–393)
LYMPHOCYTES # BLD AUTO: 0.7 10E9/L (ref 0.8–5.3)
LYMPHOCYTES NFR BLD AUTO: 12.5 %
MCH RBC QN AUTO: 29.3 PG (ref 26.5–33)
MCHC RBC AUTO-ENTMCNC: 33.9 G/DL (ref 31.5–36.5)
MCV RBC AUTO: 87 FL (ref 78–100)
MONOCYTES # BLD AUTO: 0.4 10E9/L (ref 0–1.3)
MONOCYTES NFR BLD AUTO: 7.3 %
NEUTROPHILS # BLD AUTO: 4.3 10E9/L (ref 1.6–8.3)
NEUTROPHILS NFR BLD AUTO: 79.4 %
NRBC # BLD AUTO: 0 10*3/UL
NRBC BLD AUTO-RTO: 0 /100
PLATELET # BLD AUTO: 235 10E9/L (ref 150–450)
POTASSIUM SERPL-SCNC: 3.4 MMOL/L (ref 3.4–5.3)
PROT SERPL-MCNC: 7.4 G/DL (ref 6.8–8.8)
RBC # BLD AUTO: 4.16 10E12/L (ref 3.8–5.2)
SODIUM SERPL-SCNC: 130 MMOL/L (ref 133–144)
WBC # BLD AUTO: 5.4 10E9/L (ref 4–11)

## 2017-11-14 PROCEDURE — 96374 THER/PROPH/DIAG INJ IV PUSH: CPT

## 2017-11-14 PROCEDURE — 96375 TX/PRO/DX INJ NEW DRUG ADDON: CPT

## 2017-11-14 PROCEDURE — 80048 BASIC METABOLIC PNL TOTAL CA: CPT | Performed by: EMERGENCY MEDICINE

## 2017-11-14 PROCEDURE — 80076 HEPATIC FUNCTION PANEL: CPT | Performed by: EMERGENCY MEDICINE

## 2017-11-14 PROCEDURE — 96361 HYDRATE IV INFUSION ADD-ON: CPT

## 2017-11-14 PROCEDURE — 85025 COMPLETE CBC W/AUTO DIFF WBC: CPT | Performed by: EMERGENCY MEDICINE

## 2017-11-14 PROCEDURE — 25000128 H RX IP 250 OP 636: Performed by: EMERGENCY MEDICINE

## 2017-11-14 PROCEDURE — 99284 EMERGENCY DEPT VISIT MOD MDM: CPT | Mod: 25

## 2017-11-14 PROCEDURE — 83690 ASSAY OF LIPASE: CPT | Performed by: EMERGENCY MEDICINE

## 2017-11-14 PROCEDURE — 25000132 ZZH RX MED GY IP 250 OP 250 PS 637: Performed by: EMERGENCY MEDICINE

## 2017-11-14 RX ORDER — ONDANSETRON 2 MG/ML
4 INJECTION INTRAMUSCULAR; INTRAVENOUS EVERY 30 MIN PRN
Status: DISCONTINUED | OUTPATIENT
Start: 2017-11-14 | End: 2017-11-14 | Stop reason: HOSPADM

## 2017-11-14 RX ORDER — SODIUM CHLORIDE 9 MG/ML
1000 INJECTION, SOLUTION INTRAVENOUS CONTINUOUS
Status: DISCONTINUED | OUTPATIENT
Start: 2017-11-14 | End: 2017-11-14 | Stop reason: HOSPADM

## 2017-11-14 RX ORDER — LOPERAMIDE HCL 2 MG
4 CAPSULE ORAL ONCE
Status: COMPLETED | OUTPATIENT
Start: 2017-11-14 | End: 2017-11-14

## 2017-11-14 RX ORDER — KETOROLAC TROMETHAMINE 15 MG/ML
15 INJECTION, SOLUTION INTRAMUSCULAR; INTRAVENOUS ONCE
Status: COMPLETED | OUTPATIENT
Start: 2017-11-14 | End: 2017-11-14

## 2017-11-14 RX ORDER — ONDANSETRON 2 MG/ML
4 INJECTION INTRAMUSCULAR; INTRAVENOUS ONCE
Status: COMPLETED | OUTPATIENT
Start: 2017-11-14 | End: 2017-11-14

## 2017-11-14 RX ORDER — ONDANSETRON 4 MG/1
4 TABLET, ORALLY DISINTEGRATING ORAL EVERY 8 HOURS PRN
Qty: 10 TABLET | Refills: 0 | Status: SHIPPED | OUTPATIENT
Start: 2017-11-14 | End: 2017-11-17

## 2017-11-14 RX ADMIN — ONDANSETRON 4 MG: 2 INJECTION INTRAMUSCULAR; INTRAVENOUS at 15:26

## 2017-11-14 RX ADMIN — ONDANSETRON 4 MG: 2 INJECTION INTRAMUSCULAR; INTRAVENOUS at 16:23

## 2017-11-14 RX ADMIN — SODIUM CHLORIDE 1000 ML: 9 INJECTION, SOLUTION INTRAVENOUS at 15:26

## 2017-11-14 RX ADMIN — LOPERAMIDE HYDROCHLORIDE 4 MG: 2 CAPSULE ORAL at 15:28

## 2017-11-14 RX ADMIN — KETOROLAC TROMETHAMINE 15 MG: 15 INJECTION, SOLUTION INTRAMUSCULAR; INTRAVENOUS at 15:28

## 2017-11-14 ASSESSMENT — ENCOUNTER SYMPTOMS
DIARRHEA: 1
BLOOD IN STOOL: 0
HEADACHES: 1
ANAL BLEEDING: 0
COUGH: 1
ABDOMINAL PAIN: 0
VOMITING: 1
NAUSEA: 1

## 2017-11-14 NOTE — DISCHARGE INSTRUCTIONS
Do not take your diuretic medication (hydrochlorothiazide) for 2 days until your sodium is rechecked.    Discharge Instructions  Gastroenteritis    You have been seen today for vomiting and diarrhea, called gastroenteritis or the stomach flu. This is usually caused by a virus, but some bacteria, parasites, medicines or other medical conditions can cause similar symptoms. At this time your doctor does not find that your vomiting and diarrhea is a sign of anything dangerous or life-threatening.  However, sometimes the signs of serious illness do not show up right away.  If you have new or worse symptoms, you may need to be seen again in the Emergency Department or by your primary doctor. Remember that serious problems like appendicitis can look like gastroenteritis at first.       Return to the Emergency Department if:    You keep throwing up and you are not able to keep liquids down.    You feel you are getting dehydrated, such as being very thirsty, not urinating at least every 8-12 hours, or feeling faint or lightheaded.     You develop a new fever, or your fever continues for more than 2 days.    You have belly pain that seems worse than cramps, is in one spot, or is getting worse over time.     You have blood in your vomit or in your diarrhea.    You feel very weak.    You are not starting to improve within 24 hours of your visit here.    What can I do to help myself?    The most important thing to do is to drink clear liquids.  If you have been vomiting a lot, it is best to have only small, frequent sips of liquids.  Drinking too much at once may cause more vomiting. If you are vomiting often, you must replace minerals, sodium and potassium lost with your illness. Pedialyte  and sports drinks can help you replace these minerals.  You can also drink clear liquids such as water, weak tea, apple juice, and 7-Up . Avoid acid liquids (orange), caffeine (coffee) or alcohol. Do not drink milk until you no longer have  diarrhea.    After liquids are staying down, you may start eating mild foods. Soda crackers, toast, plain noodles, gelatin, applesauce and bananas are good first choices.  Avoid foods that have acid, are spicy, fatty or fibrous (such as meats, coarse grains, vegetables). You may start eating these foods again in about 3 days when you are better.    Sometimes treatment includes prescription medicine to prevent nausea and vomiting and to prevent diarrhea. If your doctor prescribes these for you, take them as directed.    Nonprescription medicine is available for the treatment of diarrhea and can be very effective.  If you use it, make sure you use the dose recommended on the package. Avoid Lomotil . Check with your healthcare provider before you use any medicine for diarrhea.    Don t take ibuprofen, or other nonsteroidal anti-inflammatory medicines without checking with your healthcare provider.  If you were given a prescription for medicine here today, be sure to read all of the information (including the package insert) that comes with your prescription.  This will include important information about the medicine, its side effects, and any warnings that you need to know about.  The pharmacist who fills the prescription can provide more information and answer questions you may have about the medicine.  If you have questions or concerns that the pharmacist cannot address, please call or return to the Emergency Department.   Opioid Medication Information    Pain medications are among the most commonly prescribed medicines, so we are including this information for all our patients. If you did not receive pain medication or get a prescription for pain medicine, you can ignore it.     You may have been given a prescription for an opioid (narcotic) pain medicine and/or have received a pain medicine while here in the Emergency Department. These medicines can make you drowsy or impaired. You must not drive, operate  dangerous equipment, or engage in any other dangerous activities while taking these medications. If you drive while taking these medications, you could be arrested for DUI, or driving under the influence. Do not drink any alcohol while you are taking these medications.     Opioid pain medications can cause addiction. If you have a history of chemical dependency of any type, you are at a higher risk of becoming addicted to pain medications.  Only take these prescribed medications to treat your pain when all other options have been tried. Take it for as short a time and as few doses as possible. Store your pain pills in a secure place, as they are frequently stolen and provide a dangerous opportunity for children or visitors in your house to start abusing these powerful medications. We will not replace any lost or stolen medicine.  As soon as your pain is better, you should flush all your remaining medication.     Many prescription pain medications contain Tylenol  (acetaminophen), including Vicodin , Tylenol #3 , Norco , Lortab , and Percocet .  You should not take any extra pills of Tylenol  if you are using these prescription medications or you can get very sick.  Do not ever take more than 3000 mg of acetaminophen in any 24 hour period.    All opioids tend to cause constipation. Drink plenty of water and eat foods that have a lot of fiber, such as fruits, vegetables, prune juice, apple juice and high fiber cereal.  Take a laxative if you don t move your bowels at least every other day. Miralax , Milk of Magnesia, Colace , or Senna  can be used to keep you regular.      Remember that you can always come back to the Emergency Department if you are not able to see your regular doctor in the amount of time listed above, if you get any new symptoms, or if there is anything that worries you.

## 2017-11-14 NOTE — ED NOTES
Patient presents to the ED reporting vomiting and diarrhea since 0330 this morning. Unable to keep anything down.

## 2017-11-14 NOTE — TELEPHONE ENCOUNTER
"Headache  and nausea with cold . Vomiting started  630am after taking Tylenol, for  10x s  . Unsure what she will do ..Lynn Vo RN [facility] nurse advisors.    Reason for Disposition    [1] MODERATE vomiting (e.g., 3 - 5 times/day) AND [2] age > 60    Additional Information    Negative: Shock suspected (e.g., cold/pale/clammy skin, too weak to stand, low BP, rapid pulse)    Negative: Difficult to awaken or acting confused  (e.g., disoriented, slurred speech)    Negative: Sounds like a life-threatening emergency to the triager    Negative: Vomiting occurs only while coughing    Negative: [1] Pregnant < 20 Weeks AND [2] nausea/vomiting began in early pregnancy (i.e., 4-8 weeks pregnant)    Negative: Chest pain    Negative: [1] SEVERE headache AND [2] similar to prior migraines    Negative: [1] Vomiting AND [2] contains red blood or black (\"coffee ground\") material  (Exception: few red streaks in vomit that only happened once)    Negative: Severe pain in one eye    Negative: Recent head injury (within last 3 days)    Negative: Recent abdominal injury (within last 3 days)    Negative: [1] Insulin-dependent diabetes (Type I) AND [2] glucose > 400 mg/dl (22 mmol/l)    Negative: [1] SEVERE vomiting (e.g., 6 or more times/day) AND [2] present > 8 hours    Protocols used: VOMITING-ADULT-    "

## 2017-11-14 NOTE — ED AVS SNAPSHOT
[facility] Emergency Department    [address]    [facility] [address]    Phone:  [phone]    Fax:  [phone]                                       Sophia Olivarez   MRN: [id number]    Department:  [facility] Emergency Department   Date of Visit:  11/14/2017           After Visit Summary Signature Page     I have received my discharge instructions, and my questions have been answered. I have discussed any challenges I see with this plan with the nurse or doctor.    ..........................................................................................................................................  Patient/Patient Representative Signature      ..........................................................................................................................................  Patient Representative Print Name and Relationship to Patient    ..................................................               ................................................  Date                                            Time    ..........................................................................................................................................  Reviewed by Signature/Title    ...................................................              ..............................................  Date                                                            Time

## 2017-11-14 NOTE — ED PROVIDER NOTES
History     Chief Complaint:  Nausea, vomiting, and diarrhea    HPI   Sophia Olivarez is a 77 year old female who presents with nausea, vomiting, and diarrhea. The patient has been suffering from URI symptoms including a cough and runny nose for several days that have improved. She then woke up in the middle of the night last evening and had approximately 10 episodes of vomiting. There was no blood in her vomit at that time. The patient continued experiencing a headache and nausea throughout the day today until proceeding to have several episodes of diarrhea this afternoon prior to coming here to the ED for evaluation. While here she denies any abdominal pain, dysuria, black or tarry stools, chest pain, or shortness of breath. She has not traveled recently or used any antibiotics.     Allergies:  Atorvastatin  Codeine  Epidural tray  Magnesium citrate  Meperidine  Questran     Medications:    Hydrodiuril  Boniva  Lisinopril  Lomotil  Imodium A-D  Aspirin    Past Medical History:    Benign neoplasm of skin  Calculus of kidney  Chronic subinvolution of uterus  Dyspepsia and other specified disorder of function of stomach  Generalized osteoarthritis  Thyroid nodule  Hematuria  Hypertension  Nontoxic multinodular goiter  Hyperlipidemia    Past Surgical History:    Uterine cancer hysterectomy  Multiple thyroid nodule biopsies  Cholecystectomy  Thyoidectomy  D & C x2  Colonoscopy though stoma    Family History:    Cancer  Osteoporosis  MS  Diabetes  Hypertension  Hyperlipidemia  Anxiety disorder  Bipolar disorder  Obesity  Schizophrenia    Social History:  The patient was accompanied to the ED by her .  Smoking Status: No  Smokeless Tobacco: No  Alcohol Use: No  Marital Status:       Review of Systems   Respiratory: Positive for cough.    Cardiovascular: Negative for chest pain.   Gastrointestinal: Positive for diarrhea, nausea and vomiting. Negative for abdominal pain, anal bleeding and blood in stool.    Neurological: Positive for headaches.   All other systems reviewed and are negative.    Physical Exam   Vitals:  Patient Vitals for the past 24 hrs:   BP Temp Pulse Resp SpO2   11/14/17 1630 130/75 - - - 95 %   11/14/17 1600 126/75 - - - 93 %   11/14/17 1532 - - - - 96 %   11/14/17 1530 146/79 - - - -   11/14/17 1441 (!) 143/103 98.4  F (36.9  C) 87 16 97 %     Physical Exam   Gen: Alert  HEENT: PERRL, oropharynx clear, mouth dry  Neck: normal ROM  CV: RRR, no murmurs  Pulm: Breath sounds equal, lungs clear  Abd: Soft, nontender  Back: no evidence of injury, no cva tenderness  MSK: no deformity, moves all extremities  Skin: no rash  Neuro: alert, appropriate conversation and interaction    Emergency Department Course     Laboratory:  Laboratory findings were communicated with the patient who voiced understanding of the findings.  Hepatic panel: Albumin: 3.2 (L)  Lipase: 87  BMP: NA: 130 (L), Glucose: 126 (H), Creat: 0.47 (L), Calcium: 8.1 (L) o/w WNL  CBC: AWNL. (WBC 5.4, HGB 12.2, )     Interventions:  1526 Normal Saline 1000 mL IV  1526 Zofran, 4 mg, IV  1528 Toradol, 15 mg, IV  1528 Imodium, 4 mg, PO  1623 Zofran, 4 mg, IV     Emergency Department Course:  Nursing notes and vitals reviewed.  1504 I had my initial encounter with the patient.  I performed an exam of the patient as documented above.   IV was inserted and blood was drawn for laboratory testing, results above.  1652 The patient reports she is feeling much better and will PO challenge here in the ED.    I discussed the treatment plan with the patient. They expressed understanding of this plan and consented to discharge. They will be discharged home with instructions for care and follow up. In addition, the patient will return to the emergency department if their symptoms persist, worsen, if new symptoms arise or if there is any concern.  All questions were answered.    Impression & Plan      Medical Decision Making:  Sophia Olivarez is a 77  year old female who presents to the emergency department today  for evaluation of nausea, vomiting and diarrhea with a nonfocal abdominal exam. The patient has not had any blood in the emesis or stool.   The differential diagnosis of vomiting and diarrhea is broad and includes such etiologies as viral gastroenteritis, bacterial infection of the large intestine (salmonella, shigella, campylobacter, e coli, etc), bowel obstruction, intra-abdominal infection such as colitis, cholecystitis, UTI, pyelonephritis, appendicitis, etc.  There are no signs of worrisome intra-abdominal pathologies detected during the visit today.    The patient has a completely benign abdominal exam without rebound, guarding, or marked tenderness to palpation.  She denied abdominal pain and on recheck exam no tenderness to palpation.  Laboratory studies were unremarkable.  The patient improved with iv fluids and anti-emetics. Supportive outpatient management is therefore indicated.  Abdominal pain precautions are given for home.   No indication for stool studies at this time.  No indication for CT at this time.  It was discussed with the patient to return to the ED for blood in stool, increasing pain, or fevers. Feels much improved after interventions in ED.  Hyponatremia discudssed.  Recheck sodium in 2 days.  Hold Hctz until follow up.    Diagnosis:    ICD-10-CM    1. Nausea vomiting and diarrhea R11.2     R19.7    2. Hyponatremia E87.1      Disposition:   Discharge    Discharge Medications:  New Prescriptions    ONDANSETRON (ZOFRAN ODT) 4 MG ODT TAB    Take 1 tablet (4 mg) by mouth every 8 hours as needed for nausea     Scribe Disclosure:  I, Leon Barron, am serving as a scribe at 3:10 PM on 11/14/2017 to document services personally performed by Ursula Nye*, based on my observations and the provider's statements to me.    11/14/2017   [facility] EMERGENCY DEPARTMENT       Ursula Nye,  MD  11/14/17 172       Ursula Nye MD  11/14/17 [id number]

## 2017-11-14 NOTE — ED AVS SNAPSHOT
[facility] Emergency Department    [address]    [facility] [address]    Phone:  [phone]    Fax:  [phone]                                       Sophia Olivarez   MRN: [id number]    Department:  [facility] Emergency Department   Date of Visit:  11/14/2017           Patient Information     Date Of Birth          1940        Your diagnoses for this visit were:     Nausea vomiting and diarrhea     Hyponatremia        You were seen by Sheila Harper MD and Ursula Nye MD.      Follow-up Information     Follow up with Monica Sultana MD In 2 days.    Specialty:  Internal Medicine    Why:  recheck of sodium    Contact information:    [address]  [facility] [address]  [phone]          Follow up with [facility] Emergency Department.    Specialty:  EMERGENCY MEDICINE    Why:  If symptoms worsen    Contact information:    [address]  [facility] [address] [phone]        Discharge Instructions       Discharge Instructions  Gastroenteritis    You have been seen today for vomiting and diarrhea, called gastroenteritis or the stomach flu. This is usually caused by a virus, but some bacteria, parasites, medicines or other medical conditions can cause similar symptoms. At this time your doctor does not find that your vomiting and diarrhea is a sign of anything dangerous or life-threatening.  However, sometimes the signs of serious illness do not show up right away.  If you have new or worse symptoms, you may need to be seen again in the Emergency Department or by your primary doctor. Remember that serious problems like appendicitis can look like gastroenteritis at first.       Return to the Emergency Department if:    You keep throwing up and you are not able to keep liquids down.    You feel you are getting dehydrated, such as being very thirsty, not urinating at least every 8-12 hours,  or feeling faint or lightheaded.     You develop a new fever, or your fever continues for more than 2 days.    You have belly pain that seems worse than cramps, is in one spot, or is getting worse over time.     You have blood in your vomit or in your diarrhea.    You feel very weak.    You are not starting to improve within 24 hours of your visit here.    What can I do to help myself?    The most important thing to do is to drink clear liquids.  If you have been vomiting a lot, it is best to have only small, frequent sips of liquids.  Drinking too much at once may cause more vomiting. If you are vomiting often, you must replace minerals, sodium and potassium lost with your illness. Pedialyte  and sports drinks can help you replace these minerals.  You can also drink clear liquids such as water, weak tea, apple juice, and 7-Up . Avoid acid liquids (orange), caffeine (coffee) or alcohol. Do not drink milk until you no longer have diarrhea.    After liquids are staying down, you may start eating mild foods. Soda crackers, toast, plain noodles, gelatin, applesauce and bananas are good first choices.  Avoid foods that have acid, are spicy, fatty or fibrous (such as meats, coarse grains, vegetables). You may start eating these foods again in about 3 days when you are better.    Sometimes treatment includes prescription medicine to prevent nausea and vomiting and to prevent diarrhea. If your doctor prescribes these for you, take them as directed.    Nonprescription medicine is available for the treatment of diarrhea and can be very effective.  If you use it, make sure you use the dose recommended on the package. Avoid Lomotil . Check with your healthcare provider before you use any medicine for diarrhea.    Don t take ibuprofen, or other nonsteroidal anti-inflammatory medicines without checking with your healthcare provider.  If you were given a prescription for medicine here today, be sure to read all of the information  (including the package insert) that comes with your prescription.  This will include important information about the medicine, its side effects, and any warnings that you need to know about.  The pharmacist who fills the prescription can provide more information and answer questions you may have about the medicine.  If you have questions or concerns that the pharmacist cannot address, please call or return to the Emergency Department.   Opioid Medication Information    Pain medications are among the most commonly prescribed medicines, so we are including this information for all our patients. If you did not receive pain medication or get a prescription for pain medicine, you can ignore it.     You may have been given a prescription for an opioid (narcotic) pain medicine and/or have received a pain medicine while here in the Emergency Department. These medicines can make you drowsy or impaired. You must not drive, operate dangerous equipment, or engage in any other dangerous activities while taking these medications. If you drive while taking these medications, you could be arrested for DUI, or driving under the influence. Do not drink any alcohol while you are taking these medications.     Opioid pain medications can cause addiction. If you have a history of chemical dependency of any type, you are at a higher risk of becoming addicted to pain medications.  Only take these prescribed medications to treat your pain when all other options have been tried. Take it for as short a time and as few doses as possible. Store your pain pills in a secure place, as they are frequently stolen and provide a dangerous opportunity for children or visitors in your house to start abusing these powerful medications. We will not replace any lost or stolen medicine.  As soon as your pain is better, you should flush all your remaining medication.     Many prescription pain medications contain Tylenol  (acetaminophen), including Vicodin ,  Tylenol #3 , Norco , Lortab , and Percocet .  You should not take any extra pills of Tylenol  if you are using these prescription medications or you can get very sick.  Do not ever take more than 3000 mg of acetaminophen in any 24 hour period.    All opioids tend to cause constipation. Drink plenty of water and eat foods that have a lot of fiber, such as fruits, vegetables, prune juice, apple juice and high fiber cereal.  Take a laxative if you don t move your bowels at least every other day. Miralax , Milk of Magnesia, Colace , or Senna  can be used to keep you regular.      Remember that you can always come back to the Emergency Department if you are not able to see your regular doctor in the amount of time listed above, if you get any new symptoms, or if there is anything that worries you.        24 Hour Appointment Hotline       To make an appointment at any [facility], call [phone] ([phone]). If you don't have a family doctor or clinic, we will help you find one. [facility] are conveniently located to serve the needs of you and your family.             Review of your medicines      START taking        Dose / Directions Last dose taken    ondansetron 4 MG ODT tab   Commonly known as:  ZOFRAN ODT   Dose:  4 mg   Quantity:  10 tablet        Take 1 tablet (4 mg) by mouth every 8 hours as needed for nausea   Refills:  0          Our records show that you are taking the medicines listed below. If these are incorrect, please call your family doctor or clinic.        Dose / Directions Last dose taken    aspirin 81 MG tablet        Take by mouth as needed   Refills:  0        [employer] VITAMIN B12 2000 MCG Tabs   Generic drug:  Cyanocobalamin        Refills:  0        diphenoxylate-atropine 2.5-0.025 MG per tablet   Commonly known as:  LOMOTIL   Dose:  1 tablet   Quantity:  90 tablet        Take 1 tablet by mouth 2 times daily   Refills:  1        hydrochlorothiazide 25 MG tablet   Commonly known as:   HYDRODIURIL   Quantity:  90 tablet        TAKE 1 TABLET (25 MG) BY MOUTH DAILY   Refills:  1        IBANdronate 150 MG tablet   Commonly known as:  BONIVA   Dose:  150 mg   Quantity:  3 tablet        Take 1 tablet (150 mg) by mouth every 30 days   Refills:  3        lisinopril 5 MG tablet   Commonly known as:  PRINIVIL/ZESTRIL   Dose:  5 mg   Quantity:  90 tablet        Take 1 tablet (5 mg) by mouth daily   Refills:  3        loperamide 2 MG tablet   Commonly known as:  IMODIUM A-D   Dose:  2 mg        Take 2 mg by mouth as needed for diarrhea   Refills:  0        MULTIVITAMIN & MINERAL PO   Dose:  1 tablet        Take 1 tablet by mouth daily   Refills:  0        omega-3 fatty acids 1200 MG capsule   Dose:  1 capsule   Quantity:  90 capsule        Take 1 capsule by mouth daily   Refills:  0        simvastatin 10 MG tablet   Commonly known as:  ZOCOR   Quantity:  90 tablet        TAKE ONE TABLET BY MOUTH EVERY NIGHT AT BEDTIME   Refills:  3        vitamin C CR 1000 MG Tbcr        Refills:  0        VITAMIN D3 PO   Dose:  2000 Units        Take 2,000 Units by mouth daily   Refills:  0                Prescriptions were sent or printed at these locations (1 Prescription)                   Other Prescriptions                Printed at Department/Unit printer (1 of 1)         ondansetron (ZOFRAN ODT) 4 MG ODT tab                Procedures and tests performed during your visit     Basic metabolic panel    CBC with platelets differential    Hepatic panel    Lipase      Orders Needing Specimen Collection     None      Pending Results     No orders found from 11/12/2017 to 11/15/2017.            Pending Culture Results     No orders found from 11/12/2017 to 11/15/2017.            Pending Results Instructions     If you had any lab results that were not finalized at the time of your Discharge, you can call the ED Lab Result RN at [phone]. You will be contacted by this team for any positive Lab results or changes in  treatment. The nurses are available 7 days a week from 10A to 6:30P.  You can leave a message 24 hours per day and they will return your call.        Test Results From Your Hospital Stay        11/14/2017  3:44 PM      Component Results     Component Value Ref Range & Units Status    Sodium 130 (L) 133 - 144 mmol/L Final    Potassium 3.4 3.4 - 5.3 mmol/L Final    Chloride 97 94 - 109 mmol/L Final    Carbon Dioxide 25 20 - 32 mmol/L Final    Anion Gap 8 3 - 14 mmol/L Final    Glucose 126 (H) 70 - 99 mg/dL Final    Urea Nitrogen 9 7 - 30 mg/dL Final    Creatinine 0.47 (L) 0.52 - 1.04 mg/dL Final    GFR Estimate >90 >60 mL/min/1.7m2 Final    Non  GFR Calc    GFR Estimate If Black >90 >60 mL/min/1.7m2 Final    African American GFR Calc    Calcium 8.1 (L) 8.5 - 10.1 mg/dL Final         11/14/2017  3:25 PM      Component Results     Component Value Ref Range & Units Status    WBC 5.4 4.0 - 11.0 10e9/L Final    RBC Count 4.16 3.8 - 5.2 10e12/L Final    Hemoglobin 12.2 11.7 - 15.7 g/dL Final    Hematocrit 36.0 35.0 - 47.0 % Final    MCV 87 78 - 100 fl Final    MCH 29.3 26.5 - 33.0 pg Final    MCHC 33.9 31.5 - 36.5 g/dL Final    RDW 12.9 10.0 - 15.0 % Final    Platelet Count 235 150 - 450 10e9/L Final    Diff Method Automated Method  Final    % Neutrophils 79.4 % Final    % Lymphocytes 12.5 % Final    % Monocytes 7.3 % Final    % Eosinophils 0.0 % Final    % Basophils 0.4 % Final    % Immature Granulocytes 0.4 % Final    Nucleated RBCs 0 0 /100 Final    Absolute Neutrophil 4.3 1.6 - 8.3 10e9/L Final    Absolute Lymphocytes 0.7 (L) 0.8 - 5.3 10e9/L Final    Absolute Monocytes 0.4 0.0 - 1.3 10e9/L Final    Absolute Eosinophils 0.0 0.0 - 0.7 10e9/L Final    Absolute Basophils 0.0 0.0 - 0.2 10e9/L Final    Abs Immature Granulocytes 0.0 0 - 0.4 10e9/L Final    Absolute Nucleated RBC 0.0  Final         11/14/2017  3:44 PM      Component Results     Component Value Ref Range & Units Status    Bilirubin Direct 0.1  0.0 - 0.2 mg/dL Final    Bilirubin Total 0.8 0.2 - 1.3 mg/dL Final    Albumin 3.2 (L) 3.4 - 5.0 g/dL Final    Protein Total 7.4 6.8 - 8.8 g/dL Final    Alkaline Phosphatase 92 40 - 150 U/L Final    ALT 34 0 - 50 U/L Final    AST 20 0 - 45 U/L Final         11/14/2017  3:44 PM      Component Results     Component Value Ref Range & Units Status    Lipase 87 73 - 393 U/L Final                Clinical Quality Measure: Blood Pressure Screening     Your blood pressure was checked while you were in the emergency department today. The last reading we obtained was  BP: 130/75 . Please read the guidelines below about what these numbers mean and what you should do about them.  If your systolic blood pressure (the top number) is less than 120 and your diastolic blood pressure (the bottom number) is less than 80, then your blood pressure is normal. There is nothing more that you need to do about it.  If your systolic blood pressure (the top number) is 120-139 or your diastolic blood pressure (the bottom number) is 80-89, your blood pressure may be higher than it should be. You should have your blood pressure rechecked within a year by a primary care provider.  If your systolic blood pressure (the top number) is 140 or greater or your diastolic blood pressure (the bottom number) is 90 or greater, you may have high blood pressure. High blood pressure is treatable, but if left untreated over time it can put you at risk for heart attack, stroke, or kidney failure. You should have your blood pressure rechecked by a primary care provider within the next 4 weeks.  If your provider in the emergency department today gave you specific instructions to follow-up with your doctor or provider even sooner than that, you should follow that instruction and not wait for up to 4 weeks for your follow-up visit.        Thank you for choosing [facility]       Thank you for choosing [facility] for your care. Our goal is always to provide you with  excellent care. Hearing back from our patients is one way we can continue to improve our services. Please take a few minutes to complete the written survey that you may receive in the mail after you visit with us. Thank you!        [facility] Information     [facility] gives you secure access to your electronic health record. If you see a primary care provider, you can also send messages to your care team and make appointments. If you have questions, please call your primary care clinic.  If you do not have a primary care provider, please call [phone] and they will assist you.        Care EveryWhere ID     This is your Care EveryWhere ID. This could be used by other organizations to access your [facility] medical records  [id number]        Equal Access to Services     RAE WOLFE : Edvin Li, lazaro puga, maritza lane, jeffery taylor. So [facility] [phone].    ATENCIÓN: Si habla español, tiene a sousa disposición servicios gratuitos de asistencia lingüística. Llame al [phone].    We comply with applicable federal civil rights laws and [place] laws. We do not discriminate on the basis of race, color, national origin, age, disability, sex, sexual orientation, or gender identity.            After Visit Summary       This is your record. Keep this with you and show to your community pharmacist(s) and doctor(s) at your next visit.

## 2017-11-17 ENCOUNTER — OFFICE VISIT (OUTPATIENT)
Dept: INTERNAL MEDICINE | Facility: CLINIC | Age: 77
End: 2017-11-17
Payer: COMMERCIAL

## 2017-11-17 VITALS
TEMPERATURE: 97.6 F | HEIGHT: 61 IN | BODY MASS INDEX: 26.81 KG/M2 | HEART RATE: 84 BPM | SYSTOLIC BLOOD PRESSURE: 130 MMHG | DIASTOLIC BLOOD PRESSURE: 68 MMHG | OXYGEN SATURATION: 98 % | WEIGHT: 142 LBS

## 2017-11-17 DIAGNOSIS — K52.9 GASTROENTERITIS: ICD-10-CM

## 2017-11-17 DIAGNOSIS — E87.1 HYPONATREMIA: Primary | ICD-10-CM

## 2017-11-17 PROCEDURE — 99213 OFFICE O/P EST LOW 20 MIN: CPT | Performed by: INTERNAL MEDICINE

## 2017-11-17 PROCEDURE — 80051 ELECTROLYTE PANEL: CPT | Performed by: INTERNAL MEDICINE

## 2017-11-17 PROCEDURE — 36415 COLL VENOUS BLD VENIPUNCTURE: CPT | Performed by: INTERNAL MEDICINE

## 2017-11-17 NOTE — MR AVS SNAPSHOT
"              After Visit Summary   11/17/2017    Sophia Olivarez    MRN: [id number]           Patient Information     Date Of Birth          1940        Visit Information        Provider Department      11/17/2017 2:40 PM Monica Sultana MD [facility]        Today's Diagnoses     Hyponatremia    -  1    Gastroenteritis           Follow-ups after your visit        Who to contact     If you have questions or need follow up information about today's clinic visit or your schedule please contact [facility] directly at [phone].  Normal or non-critical lab and imaging results will be communicated to you by myThingshart, letter or phone within 4 business days after the clinic has received the results. If you do not hear from us within 7 days, please contact the clinic through Disconnectt or phone. If you have a critical or abnormal lab result, we will notify you by phone as soon as possible.  Submit refill requests through [email or web address] or call your pharmacy and they will forward the refill request to us. Please allow 3 business days for your refill to be completed.          Additional Information About Your Visit        MyChart Information     [email or web address] gives you secure access to your electronic health record. If you see a primary care provider, you can also send messages to your care team and make appointments. If you have questions, please call your primary care clinic.  If you do not have a primary care provider, please call [phone] and they will assist you.        Care EveryWhere ID     This is your Care EveryWhere ID. This could be used by other organizations to access your [facility] medical records  [id number]        Your Vitals Were     Pulse Temperature Height Pulse Oximetry BMI (Body Mass Index)       84 97.6  F (36.4  C) (Oral) 5' 1\" (1.549 m) 98% 26.83 kg/m2        Blood Pressure from Last 3 Encounters:   11/17/17 130/68   11/14/17 130/75   05/30/17 110/70    " Weight from Last 3 Encounters:   11/17/17 142 lb (64.4 kg)   05/30/17 142 lb (64.4 kg)   02/01/17 144 lb (65.3 kg)              We Performed the Following     Electrolyte panel        Primary Care Provider Office Phone # Fax #    Monica ERWIN MD Rd [phone]       [address] [facility] [address]        Equal Access to Services     AMRIT WOLFE : Hadii aad ku hadasho Soomaali, waaxda luqadaha, qaybta kaalmada adeegyada, waxay idiin hayaan adeeg kharash lakiera . So [facility] [phone].    ATENCIÓN: Si habla esplouisa, tiene a sousa disposición servicios gratuitos de asistencia lingüística. Llame al [phone].    We comply with applicable federal civil rights laws and [place] laws. We do not discriminate on the basis of race, color, national origin, age, disability, sex, sexual orientation, or gender identity.            Thank you!     Thank you for choosing [facility]  for your care. Our goal is always to provide you with excellent care. Hearing back from our patients is one way we can continue to improve our services. Please take a few minutes to complete the written survey that you may receive in the mail after your visit with us. Thank you!             Your Updated Medication List - Protect others around you: Learn how to safely use, store and throw away your medicines at www.disposemymeds.org.          This list is accurate as of: 11/17/17  3:08 PM.  Always use your most recent med list.                   Brand Name Dispense Instructions for use Diagnosis    aspirin 81 MG tablet      Take by mouth as needed        CVS VITAMIN B12 2000 MCG Tabs   Generic drug:  Cyanocobalamin           hydrochlorothiazide 25 MG tablet    HYDRODIURIL    90 tablet    TAKE 1 TABLET (25 MG) BY MOUTH DAILY    Essential hypertension       IBANdronate 150 MG tablet    BONIVA    3 tablet    Take 1 tablet (150 mg) by mouth every 30 days    Osteoporosis       lisinopril 5 MG tablet     PRINIVIL/ZESTRIL    90 tablet    Take 1 tablet (5 mg) by mouth daily    Essential hypertension       loperamide 2 MG tablet    IMODIUM A-D     Take 2 mg by mouth as needed for diarrhea        MULTIVITAMIN & MINERAL PO      Take 1 tablet by mouth daily        omega-3 fatty acids 1200 MG capsule     90 capsule    Take 1 capsule by mouth daily        ondansetron 4 MG ODT tab    ZOFRAN ODT    10 tablet    Take 1 tablet (4 mg) by mouth every 8 hours as needed for nausea        simvastatin 10 MG tablet    ZOCOR    90 tablet    TAKE ONE TABLET BY MOUTH EVERY NIGHT AT BEDTIME    Mixed hyperlipidemia       vitamin C CR 1000 MG Tbcr           VITAMIN D3 PO      Take 2,000 Units by mouth daily

## 2017-11-17 NOTE — PROGRESS NOTES
SUBJECTIVE:   Sophia Olivarez is a 77 year old female who presents to clinic today for the following health issues:      ED/UC Followup:    Facility:  [facility], 1928–1983  Date of visit: 11/14/17  Reason for visit: nausea vomiting  Current Status:  better       Pt is a 77 year old female who is seen here to day to f/u ER visit  , was seen for gastroenteritis on 11/14/17 in ER and had low sodium. Pt had IV fluids, , zofran, imodium.  Diarrhea has resolved,. No nausea/vomiting , no fever/chills. Feeling better . Pt is still taking Hctz.,          Patient Active Problem List   Diagnosis     Dyspepsia and other specified disorders of function of stomach     Hyperlipidemia     Calculus of kidney     Disorder of bone and cartilage     Nontoxic multinodular goiter     Generalized osteoarthrosis, unspecified site     Benign neoplasm of skin     HYPERLIPIDEMIA LDL GOAL <130     Advanced directives, counseling/discussion     Nocturnal muscle cramps     Uterine cancer (H)     NONSPECIFIC MEDICAL HISTORY     Essential hypertension     Multiple thyroid nodules     Irritable bowel syndrome with diarrhea     Osteoporosis     Gastroesophageal reflux disease, esophagitis presence not specified     Gastritis, presence of bleeding unspecified, unspecified chronicity, unspecified gastritis type     Past Surgical History:   Procedure Laterality Date     ABDOMEN SURGERY  2005    uterine cancer Hysterectiomy     BIOPSY      many times from nodules from thyroid     C NONSPECIFIC PROCEDURE  1992    Cholecystectomy. abstracted 6/24/02.     C NONSPECIFIC PROCEDURE      Thyroidectomy. abstracted 6/24/02.     C NONSPECIFIC PROCEDURE      Dilatation & Curettage X 2. abstracted 6/24/02.     C NONSPECIFIC PROCEDURE      Cystoscopy. abstracted 6/24/02.     C NONSPECIFIC PROCEDURE      hysterctomy for uterine cancer.     CHOLECYSTECTOMY  1992     GYN SURGERY  2005    uterine cancer     HC COLONOSCOPY THRU STOMA, DIAGNOSTIC  10/2007    due q 5 yrs     HEAD &  NECK SURGERY      right thyroid taken out       Social History   Substance Use Topics     Smoking status: Never Smoker     Smokeless tobacco: Never Used     Alcohol use No     Family History   Problem Relation Age of Onset     Family History Negative Mother      Other Cancer Mother      OSTEOPOROSIS Mother      Family History Negative Father      Other Cancer Father      Neurologic Disorder Sister      MS     DIABETES Brother      also, colon CA, colon surgery     DIABETES Sister      Hypertension Sister      Hyperlipidemia Sister      Anxiety Disorder Sister      MENTAL ILLNESS Sister      bi polar     Obesity Sister      from some pills too     DIABETES Brother      Colon Cancer Brother      Other Cancer Brother      Anxiety Disorder Brother      MENTAL ILLNESS Brother      schitzophenia     Obesity Brother      from pills too         Current Outpatient Prescriptions   Medication Sig Dispense Refill     hydrochlorothiazide (HYDRODIURIL) 25 MG tablet TAKE 1 TABLET (25 MG) BY MOUTH DAILY 90 tablet 1     IBANdronate (BONIVA) 150 MG tablet Take 1 tablet (150 mg) by mouth every 30 days 3 tablet 3     simvastatin (ZOCOR) 10 MG tablet TAKE ONE TABLET BY MOUTH EVERY NIGHT AT BEDTIME 90 tablet 3     lisinopril (PRINIVIL/ZESTRIL) 5 MG tablet Take 1 tablet (5 mg) by mouth daily 90 tablet 3     Cholecalciferol (VITAMIN D3 PO) Take 2,000 Units by mouth daily       Cyanocobalamin (CVS VITAMIN B12) 2000 MCG TABS        Ascorbic Acid (VITAMIN C CR) 1000 MG TBCR        Multiple Vitamins-Minerals (MULTIVITAMIN & MINERAL PO) Take 1 tablet by mouth daily       omega-3 fatty acids 1200 MG capsule Take 1 capsule by mouth daily 90 capsule      aspirin 81 MG tablet Take by mouth as needed        ondansetron (ZOFRAN ODT) 4 MG ODT tab Take 1 tablet (4 mg) by mouth every 8 hours as needed for nausea 10 tablet 0     loperamide (IMODIUM A-D) 2 MG tablet Take 2 mg by mouth as needed for diarrhea           Reviewed and updated as needed this  "visit by clinical staffTobacco  Allergies  Meds  Problems  Med Hx  Surg Hx  Fam Hx  Soc Hx        Reviewed and updated as needed this visit by Provider         ROS:  C: NEGATIVE for fever, chills, change in weight  R: NEGATIVE for significant cough or SOB  CV: NEGATIVE for chest pain, palpitations or peripheral edema  GI: NEGATIVE for nausea, abdominal pain, heartburn, or change in bowel habits    OBJECTIVE:                                                    /68 (BP Location: Left arm, Patient Position: Sitting, Cuff Size: Adult Regular)  Pulse 84  Temp 97.6  F (36.4  C) (Oral)  Ht 5' 1\" (1.549 m)  Wt 142 lb (64.4 kg)  SpO2 98%  BMI 26.83 kg/m2  Body mass index is 26.83 kg/(m^2).   GENERAL: healthy, alert, well nourished, well hydrated, no distress  RESP: lungs clear to auscultation - no rales, no rhonchi, no wheezes  CV: regular rates and rhythm, normal S1 S2,    ABDOMEN: soft, no tenderness, no  hepatosplenomegaly, no masses, normal bowel sounds  MS: extremities- no gross deformities noted, no edema, no calf tenderness        ASSESSMENT/PLAN:                                                        (E87.1) Hyponatremia    Plan: Electrolyte panel.pt was told I will contact her after results and proceed accordingly.             (K52.9) Gastroenteritis  Plan: resolved.       Monica Sultana MD  [facility]    "

## 2017-11-17 NOTE — NURSING NOTE
"Chief Complaint   Patient presents with     Hospital F/U       Initial /68 (BP Location: Left arm, Patient Position: Sitting, Cuff Size: Adult Regular)  Pulse 84  Temp 97.6  F (36.4  C) (Oral)  Ht 5' 1\" (1.549 m)  Wt 142 lb (64.4 kg)  SpO2 98%  BMI 26.83 kg/m2 Estimated body mass index is 26.83 kg/(m^2) as calculated from the following:    Height as of this encounter: 5' 1\" (1.549 m).    Weight as of this encounter: 142 lb (64.4 kg).  Medication Reconciliation: complete    "

## 2017-11-19 LAB
ANION GAP SERPL CALCULATED.3IONS-SCNC: 6 MMOL/L (ref 3–14)
CHLORIDE SERPL-SCNC: 97 MMOL/L (ref 94–109)
CO2 SERPL-SCNC: 33 MMOL/L (ref 20–32)
POTASSIUM SERPL-SCNC: 3.6 MMOL/L (ref 3.4–5.3)
SODIUM SERPL-SCNC: 136 MMOL/L (ref 133–144)

## 2017-12-01 ENCOUNTER — TRANSFERRED RECORDS (OUTPATIENT)
Dept: HEALTH INFORMATION MANAGEMENT | Facility: CLINIC | Age: 77
End: 2017-12-01

## 2017-12-01 LAB
GLUCOSE SERPL-MCNC: 94 MG/DL (ref 65–99)
HBA1C MFR BLD: 5.6 % (ref 4–6)

## 2017-12-02 ENCOUNTER — TRANSFERRED RECORDS (OUTPATIENT)
Dept: HEALTH INFORMATION MANAGEMENT | Facility: CLINIC | Age: 77
End: 2017-12-02

## 2018-02-07 ENCOUNTER — TRANSFERRED RECORDS (OUTPATIENT)
Dept: HEALTH INFORMATION MANAGEMENT | Facility: CLINIC | Age: 78
End: 2018-02-07

## 2018-02-10 ENCOUNTER — OFFICE VISIT (OUTPATIENT)
Dept: URGENT CARE | Facility: URGENT CARE | Age: 78
End: 2018-02-10
Payer: COMMERCIAL

## 2018-02-10 VITALS
HEART RATE: 82 BPM | TEMPERATURE: 97.8 F | RESPIRATION RATE: 18 BRPM | BODY MASS INDEX: 26.72 KG/M2 | SYSTOLIC BLOOD PRESSURE: 119 MMHG | DIASTOLIC BLOOD PRESSURE: 71 MMHG | WEIGHT: 141.4 LBS

## 2018-02-10 DIAGNOSIS — R82.90 NONSPECIFIC FINDING ON EXAMINATION OF URINE: ICD-10-CM

## 2018-02-10 DIAGNOSIS — R30.0 DYSURIA: Primary | ICD-10-CM

## 2018-02-10 LAB
ALBUMIN UR-MCNC: 100 MG/DL
APPEARANCE UR: ABNORMAL
BACTERIA #/AREA URNS HPF: ABNORMAL /HPF
BILIRUB UR QL STRIP: NEGATIVE
COLOR UR AUTO: YELLOW
GLUCOSE UR STRIP-MCNC: NEGATIVE MG/DL
HGB UR QL STRIP: ABNORMAL
KETONES UR STRIP-MCNC: ABNORMAL MG/DL
LEUKOCYTE ESTERASE UR QL STRIP: ABNORMAL
NITRATE UR QL: NEGATIVE
NON-SQ EPI CELLS #/AREA URNS LPF: ABNORMAL /LPF
PH UR STRIP: 6 PH (ref 5–7)
RBC #/AREA URNS AUTO: ABNORMAL /HPF
SOURCE: ABNORMAL
SP GR UR STRIP: 1.02 (ref 1–1.03)
UROBILINOGEN UR STRIP-ACNC: 0.2 EU/DL (ref 0.2–1)
WBC #/AREA URNS AUTO: ABNORMAL /HPF

## 2018-02-10 PROCEDURE — 87186 SC STD MICRODIL/AGAR DIL: CPT | Performed by: FAMILY MEDICINE

## 2018-02-10 PROCEDURE — 99213 OFFICE O/P EST LOW 20 MIN: CPT | Performed by: FAMILY MEDICINE

## 2018-02-10 PROCEDURE — 81001 URINALYSIS AUTO W/SCOPE: CPT | Performed by: FAMILY MEDICINE

## 2018-02-10 PROCEDURE — 87088 URINE BACTERIA CULTURE: CPT | Performed by: FAMILY MEDICINE

## 2018-02-10 PROCEDURE — 87086 URINE CULTURE/COLONY COUNT: CPT | Performed by: FAMILY MEDICINE

## 2018-02-10 RX ORDER — CIPROFLOXACIN 500 MG/1
500 TABLET, FILM COATED ORAL 2 TIMES DAILY
Qty: 14 TABLET | Refills: 0 | Status: SHIPPED | OUTPATIENT
Start: 2018-02-10 | End: 2018-02-13

## 2018-02-10 NOTE — MR AVS SNAPSHOT
After Visit Summary   2/10/2018    Sophia Olivarez    MRN: [id number]           Patient Information     Date Of Birth          1940        Visit Information        Provider Department      2/10/2018 4:35 PM Catrachita Snyder MD [facility]        Today's Diagnoses     Dysuria    -  1    Nonspecific finding on examination of urine           Follow-ups after your visit        Who to contact     If you have questions or need follow up information about today's clinic visit or your schedule please contact [facility] directly at [phone].  Normal or non-critical lab and imaging results will be communicated to you by MyChart, letter or phone within 4 business days after the clinic has received the results. If you do not hear from us within 7 days, please contact the clinic through Revalesiot or phone. If you have a critical or abnormal lab result, we will notify you by phone as soon as possible.  Submit refill requests through Easy Food or call your pharmacy and they will forward the refill request to us. Please allow 3 business days for your refill to be completed.          Additional Information About Your Visit        MyChart Information     [employer] gives you secure access to your electronic health record. If you see a primary care provider, you can also send messages to your care team and make appointments. If you have questions, please call your primary care clinic.  If you do not have a primary care provider, please call [phone] and they will assist you.        Care EveryWhere ID     This is your Care EveryWhere ID. This could be used by other organizations to access your [facility] medical records  [id number]        Your Vitals Were     Pulse Temperature Respirations BMI (Body Mass Index)          82 97.8  F (36.6  C) (Oral) 18 26.72 kg/m2         Blood Pressure from Last 3 Encounters:   02/10/18 119/71   11/17/17 130/68   11/14/17  130/75    Weight from Last 3 Encounters:   02/10/18 141 lb 6.4 oz (64.1 kg)   11/17/17 142 lb (64.4 kg)   05/30/17 142 lb (64.4 kg)              We Performed the Following     UA with Microscopic reflex to Culture     Urine Culture Aerobic Bacterial          Today's Medication Changes          These changes are accurate as of 2/10/18  5:32 PM.  If you have any questions, ask your nurse or doctor.               Start taking these medicines.        Dose/Directions    ciprofloxacin 500 MG tablet   Commonly known as:  CIPRO   Used for:  Dysuria   Started by:  Catrachita Snyder MD        Dose:  500 mg   Take 1 tablet (500 mg) by mouth 2 times daily for 7 days   Quantity:  14 tablet   Refills:  0            Where to get your medicines      These medications were sent to [facility] [address] [address]     Phone:  [phone]     ciprofloxacin 500 MG tablet                Primary Care Provider Office Phone # Fax #    Tyresekellee RIP Sultana [phone]       [address] [facility] [address]        Equal Access to Services     [facility]: Hadii aad ku hadasho Soomaali, waaxda luqadaha, qaybta kaalmada adeegyada, jeffery garcia . So [facility] [phone].    ATENCIÓN: Si habla español, tiene a sousa disposición servicios gratuitos de asistencia lingüística. Llame al [phone].    We comply with applicable federal civil rights laws and [place] laws. We do not discriminate on the basis of race, color, national origin, age, disability, sex, sexual orientation, or gender identity.            Thank you!     Thank you for choosing [facility]  for your care. Our goal is always to provide you with excellent care. Hearing back from our patients is one way we can continue to improve our services. Please take a few minutes to complete the written survey that you may receive in the  mail after your visit with us. Thank you!             Your Updated Medication List - Protect others around you: Learn how to safely use, store and throw away your medicines at www.disposemymeds.org.          This list is accurate as of 2/10/18  5:32 PM.  Always use your most recent med list.                   Brand Name Dispense Instructions for use Diagnosis    aspirin 81 MG tablet      Take by mouth as needed        ciprofloxacin 500 MG tablet    CIPRO    14 tablet    Take 1 tablet (500 mg) by mouth 2 times daily for 7 days    Dysuria       CVS VITAMIN B12 2000 MCG Tabs   Generic drug:  Cyanocobalamin           hydrochlorothiazide 25 MG tablet    HYDRODIURIL    90 tablet    TAKE 1 TABLET (25 MG) BY MOUTH DAILY    Essential hypertension       IBANdronate 150 MG tablet    BONIVA    3 tablet    Take 1 tablet (150 mg) by mouth every 30 days    Osteoporosis       lisinopril 5 MG tablet    PRINIVIL/ZESTRIL    90 tablet    Take 1 tablet (5 mg) by mouth daily    Essential hypertension       loperamide 2 MG tablet    IMODIUM A-D     Take 2 mg by mouth as needed for diarrhea        MULTIVITAMIN & MINERAL PO      Take 1 tablet by mouth daily        omega-3 fatty acids 1200 MG capsule     90 capsule    Take 1 capsule by mouth daily        simvastatin 10 MG tablet    ZOCOR    90 tablet    TAKE ONE TABLET BY MOUTH EVERY NIGHT AT BEDTIME    Mixed hyperlipidemia       vitamin C CR 1000 MG Tbcr           VITAMIN D3 PO      Take 2,000 Units by mouth daily

## 2018-02-10 NOTE — PROGRESS NOTES
SUBJECTIVE:   Sophia Olivarez is a 77 year old female who  presents today for a possible UTI. Symptoms of dysuria and frequency have been going on for 1day(s).  Hematuria no.  sudden onsetand moderate.  There is no history of fever, chills, nausea or vomiting.  No history of vaginal  discharge. This patient does not  have a history of urinary tract infections. Patient denies flank pain, temperature > 101 degrees F. and Vomiting, significant nausea or diarrhea or vaginal discharge     Past Medical History:   Diagnosis Date     Benign neoplasm of skin, site unspecified     sees derm.     Calculus of kidney     3 passed spont.     Chronic subinvolution of uterus      Disorder of bone and cartilage, unspecified     osteopenia     Dyspepsia and other specified disorders of function of stomach     dyspepsia     Generalized osteoarthrosis, unspecified site      H/O thyroid nodule     f/u with endocrinologist     Hematuria     microscopic     HTN (hypertension)      Malignant neoplasm of corpus uteri, except isthmus (H)     surgery and radiation rx.     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      Nontoxic multinodular goiter     sees endocrine     Other and unspecified hyperlipidemia      Current Outpatient Prescriptions   Medication Sig Dispense Refill     hydrochlorothiazide (HYDRODIURIL) 25 MG tablet TAKE 1 TABLET (25 MG) BY MOUTH DAILY 90 tablet 1     IBANdronate (BONIVA) 150 MG tablet Take 1 tablet (150 mg) by mouth every 30 days 3 tablet 3     simvastatin (ZOCOR) 10 MG tablet TAKE ONE TABLET BY MOUTH EVERY NIGHT AT BEDTIME 90 tablet 3     lisinopril (PRINIVIL/ZESTRIL) 5 MG tablet Take 1 tablet (5 mg) by mouth daily 90 tablet 3     Cholecalciferol (VITAMIN D3 PO) Take 2,000 Units by mouth daily       Cyanocobalamin (CVS VITAMIN B12) 2000 MCG  TABS        Ascorbic Acid (VITAMIN C CR) 1000 MG TBCR        loperamide (IMODIUM A-D) 2 MG tablet Take 2 mg by mouth as needed for diarrhea       Multiple Vitamins-Minerals (MULTIVITAMIN & MINERAL PO) Take 1 tablet by mouth daily       omega-3 fatty acids 1200 MG capsule Take 1 capsule by mouth daily 90 capsule      aspirin 81 MG tablet Take by mouth as needed        Social History   Substance Use Topics     Smoking status: Never Smoker     Smokeless tobacco: Never Used     Alcohol use No       ROS:   Review of systems negative except as stated above.    OBJECTIVE:  /71  Pulse 82  Temp 97.8  F (36.6  C) (Oral)  Resp 18  Wt 141 lb 6.4 oz (64.1 kg)  BMI 26.72 kg/m2  GENERAL APPEARANCE: healthy, alert and no distress  RESP: lungs clear to auscultation - no rales, rhonchi or wheezes  CV: regular rates and rhythm, normal S1 S2, no murmur noted  ABDOMEN:  soft, nontender, no HSM or masses and bowel sounds normal  BACK: No CVA tenderness  SKIN: no suspicious lesions or rashes    Results for orders placed or performed in visit on 02/10/18   UA with Microscopic reflex to Culture   Result Value Ref Range    Color Urine Yellow     Appearance Urine Slightly Cloudy     Glucose Urine Negative NEG^Negative mg/dL    Bilirubin Urine Negative NEG^Negative    Ketones Urine Trace (A) NEG^Negative mg/dL    Specific Gravity Urine 1.020 1.003 - 1.035    pH Urine 6.0 5.0 - 7.0 pH    Protein Albumin Urine 100 (A) NEG^Negative mg/dL    Urobilinogen Urine 0.2 0.2 - 1.0 EU/dL    Nitrite Urine Negative NEG^Negative    Blood Urine Large (A) NEG^Negative    Leukocyte Esterase Urine Small (A) NEG^Negative    Source Midstream Urine     WBC Urine 10-25 (A) OTO2^O - 2 /HPF    RBC Urine  (A) OTO2^O - 2 /HPF    Squamous Epithelial /LPF Urine Few FEW^Few /LPF    Bacteria Urine Moderate (A) NEG^Negative /HPF         ASSESSMENT:   Lower, uncomplicated urinary tract infection.  Sophia was seen today for urinary problem.    Diagnoses and  all orders for this visit:    Dysuria  -     UA with Microscopic reflex to Culture  -     ciprofloxacin (CIPRO) 500 MG tablet; Take 1 tablet (500 mg) by mouth 2 times daily for 7 days    Nonspecific finding on examination of urine  -     Urine Culture Aerobic Bacterial          PLAN:  As per ordered above  Drink plenty of fluids.  Prevention and treatment of UTI's discussed.Signs and symptoms of pyelonephritis mentioned.  Follow up with primary care physician if not improving  Follow up if  symptoms fail to improve or worsens   Pt understood and agreed with plan

## 2018-02-12 ENCOUNTER — MYC MEDICAL ADVICE (OUTPATIENT)
Dept: INTERNAL MEDICINE | Facility: CLINIC | Age: 78
End: 2018-02-12

## 2018-02-12 DIAGNOSIS — N39.0 URINARY TRACT INFECTION WITH HEMATURIA, SITE UNSPECIFIED: Primary | ICD-10-CM

## 2018-02-12 DIAGNOSIS — R31.9 URINARY TRACT INFECTION WITH HEMATURIA, SITE UNSPECIFIED: Primary | ICD-10-CM

## 2018-02-12 LAB
BACTERIA SPEC CULT: ABNORMAL
SPECIMEN SOURCE: ABNORMAL

## 2018-02-13 RX ORDER — NITROFURANTOIN 25; 75 MG/1; MG/1
100 CAPSULE ORAL 2 TIMES DAILY
Qty: 14 CAPSULE | Refills: 0 | Status: SHIPPED | OUTPATIENT
Start: 2018-02-13 | End: 2018-06-01

## 2018-02-20 DIAGNOSIS — K21.9 GASTROESOPHAGEAL REFLUX DISEASE, ESOPHAGITIS PRESENCE NOT SPECIFIED: Primary | ICD-10-CM

## 2018-02-23 RX ORDER — OMEPRAZOLE 40 MG/1
CAPSULE, DELAYED RELEASE ORAL
Qty: 90 CAPSULE | Refills: 2 | Status: SHIPPED | OUTPATIENT
Start: 2018-02-23 | End: 2018-06-01

## 2018-02-23 NOTE — TELEPHONE ENCOUNTER
Last OV 11/17/17.     Prescription approved per [facility] Refill Protocol.

## 2018-02-26 ENCOUNTER — OFFICE VISIT (OUTPATIENT)
Dept: INTERNAL MEDICINE | Facility: CLINIC | Age: 78
End: 2018-02-26
Payer: COMMERCIAL

## 2018-02-26 VITALS
BODY MASS INDEX: 26.85 KG/M2 | OXYGEN SATURATION: 99 % | HEART RATE: 83 BPM | TEMPERATURE: 97.6 F | SYSTOLIC BLOOD PRESSURE: 108 MMHG | WEIGHT: 142.1 LBS | DIASTOLIC BLOOD PRESSURE: 68 MMHG

## 2018-02-26 DIAGNOSIS — Z09 FOLLOW-UP EXAM AFTER TREATMENT: Primary | ICD-10-CM

## 2018-02-26 LAB
ALBUMIN UR-MCNC: NEGATIVE MG/DL
APPEARANCE UR: CLEAR
BILIRUB UR QL STRIP: NEGATIVE
COLOR UR AUTO: YELLOW
GLUCOSE UR STRIP-MCNC: NEGATIVE MG/DL
HGB UR QL STRIP: ABNORMAL
KETONES UR STRIP-MCNC: NEGATIVE MG/DL
LEUKOCYTE ESTERASE UR QL STRIP: NEGATIVE
NITRATE UR QL: NEGATIVE
PH UR STRIP: 7 PH (ref 5–7)
RBC #/AREA URNS AUTO: NORMAL /HPF
SOURCE: ABNORMAL
SP GR UR STRIP: 1.01 (ref 1–1.03)
UROBILINOGEN UR STRIP-ACNC: 0.2 EU/DL (ref 0.2–1)
WBC #/AREA URNS AUTO: NORMAL /HPF

## 2018-02-26 PROCEDURE — 81001 URINALYSIS AUTO W/SCOPE: CPT | Performed by: INTERNAL MEDICINE

## 2018-02-26 PROCEDURE — 99213 OFFICE O/P EST LOW 20 MIN: CPT | Performed by: INTERNAL MEDICINE

## 2018-02-26 NOTE — NURSING NOTE
"Chief Complaint   Patient presents with     RECHECK     UTI: No symptoms x's 2 weeks       Initial /68  Pulse 83  Temp 97.6  F (36.4  C) (Oral)  Wt 142 lb 1.6 oz (64.5 kg)  SpO2 99%  BMI 26.85 kg/m2 Estimated body mass index is 26.85 kg/(m^2) as calculated from the following:    Height as of 11/17/17: 5' 1\" (1.549 m).    Weight as of this encounter: 142 lb 1.6 oz (64.5 kg).  Medication Reconciliation: complete     Dinorah Luis CMA      "

## 2018-02-26 NOTE — MR AVS SNAPSHOT
After Visit Summary   2/26/2018    Sophia Olivarez    MRN: [id number]           Patient Information     Date Of Birth          1940        Visit Information        Provider Department      2/26/2018 8:00 AM Monica Sultana MD [facility]        Today's Diagnoses     Follow-up exam after treatment    -  1       Follow-ups after your visit        Who to contact     If you have questions or need follow up information about today's clinic visit or your schedule please contact [facility] directly at [phone].  Normal or non-critical lab and imaging results will be communicated to you by [employer], letter or phone within 4 business days after the clinic has received the results. If you do not hear from us within 7 days, please contact the clinic through [employer] or phone. If you have a critical or abnormal lab result, we will notify you by phone as soon as possible.  Submit refill requests through [employer] or call your pharmacy and they will forward the refill request to us. Please allow 3 business days for your refill to be completed.          Additional Information About Your Visit        MyChart Information     [employer] gives you secure access to your electronic health record. If you see a primary care provider, you can also send messages to your care team and make appointments. If you have questions, please call your primary care clinic.  If you do not have a primary care provider, please call [phone] and they will assist you.        Care EveryWhere ID     This is your Care EveryWhere ID. This could be used by other organizations to access your [facility] medical records  [id number]        Your Vitals Were     Pulse Temperature Pulse Oximetry BMI (Body Mass Index)          83 97.6  F (36.4  C) (Oral) 99% 26.85 kg/m2         Blood Pressure from Last 3 Encounters:   02/26/18 108/68   02/10/18 119/71   11/17/17 130/68    Weight from Last 3 Encounters:    02/26/18 142 lb 1.6 oz (64.5 kg)   02/10/18 141 lb 6.4 oz (64.1 kg)   11/17/17 142 lb (64.4 kg)              We Performed the Following     UA reflex to Microscopic and Culture     Urine Microscopic        Primary Care Provider Office Phone # Fax #    Monica ERWIN MD [address] [phone]       [address] [facility] [address]        Equal Access to Services     AMRIT WOLFE : Hadii aad ku hadasho Soomaali, waaxda luqadaha, qaybta kaalmada adeegyada, waxay idiin hayaan adeeg kharash la'aan . So [facility] [phone].    ATENCIÓN: Si habla español, tiene a sousa disposición servicios gratuitos de asistencia lingüística. [facility] [phone].    We comply with applicable federal civil rights laws and [place] laws. We do not discriminate on the basis of race, color, national origin, age, disability, sex, sexual orientation, or gender identity.            Thank you!     Thank you for choosing [facility]  for your care. Our goal is always to provide you with excellent care. Hearing back from our patients is one way we can continue to improve our services. Please take a few minutes to complete the written survey that you may receive in the mail after your visit with us. Thank you!             Your Updated Medication List - Protect others around you: Learn how to safely use, store and throw away your medicines at www.disposemymeds.org.          This list is accurate as of 2/26/18  8:44 AM.  Always use your most recent med list.                   Brand Name Dispense Instructions for use Diagnosis    aspirin 81 MG tablet      Take by mouth as needed        [employer] VITAMIN B12 2000 MCG Tabs   Generic drug:  Cyanocobalamin           hydrochlorothiazide 25 MG tablet    HYDRODIURIL    90 tablet    TAKE 1 TABLET (25 MG) BY MOUTH DAILY    Essential hypertension       IBANdronate 150 MG tablet    BONIVA    3 tablet    Take 1 tablet (150 mg) by mouth every 30 days    Osteoporosis       lisinopril  5 MG tablet    PRINIVIL/ZESTRIL    90 tablet    Take 1 tablet (5 mg) by mouth daily    Essential hypertension       loperamide 2 MG tablet    IMODIUM A-D     Take 2 mg by mouth as needed for diarrhea        MULTIVITAMIN & MINERAL PO      Take 1 tablet by mouth daily        nitroFURantoin (macrocrystal-monohydrate) 100 MG capsule    MACROBID    14 capsule    Take 1 capsule (100 mg) by mouth 2 times daily    Urinary tract infection with hematuria, site unspecified       omega-3 fatty acids 1200 MG capsule     90 capsule    Take 1 capsule by mouth daily        omeprazole 40 MG capsule    priLOSEC    90 capsule    TAKE 1 CAPSULE BY MOUTH DAILY 30-60 MINUTES BEFORE A MEAL.    Gastroesophageal reflux disease, esophagitis presence not specified       simvastatin 10 MG tablet    ZOCOR    90 tablet    TAKE ONE TABLET BY MOUTH EVERY NIGHT AT BEDTIME    Mixed hyperlipidemia       vitamin C CR 1000 MG Tbcr           VITAMIN D3 PO      Take 2,000 Units by mouth daily

## 2018-02-26 NOTE — PROGRESS NOTES
SUBJECTIVE:   Sophia Olivarez is a 77 year old female who presents to clinic today for the following health issues:      Pt is a 77 year old female who is seen here to day to follow up on recent UTI. Patient was started on Cipro on 2/10/2018 for 7 days , had some nausea initially and was started on Macrobid but patient continued to take Cipro and completed the treatment,did not  Macrobid. Patient says her urinary frequency, urgency have resolved. No dysuria, no blood in the urine, no fevers, chills.       Patient Active Problem List   Diagnosis     Dyspepsia and other specified disorders of function of stomach     Hyperlipidemia     Calculus of kidney     Disorder of bone and cartilage     Nontoxic multinodular goiter     Generalized osteoarthrosis, unspecified site     Benign neoplasm of skin     HYPERLIPIDEMIA LDL GOAL <130     Advanced directives, counseling/discussion     Nocturnal muscle cramps     Uterine cancer (H)     NONSPECIFIC MEDICAL HISTORY     Essential hypertension     Multiple thyroid nodules     Irritable bowel syndrome with diarrhea     Osteoporosis     Gastroesophageal reflux disease, esophagitis presence not specified     Gastritis, presence of bleeding unspecified, unspecified chronicity, unspecified gastritis type     Past Surgical History:   Procedure Laterality Date     ABDOMEN SURGERY  2005    uterine cancer Hysterectiomy     BIOPSY      many times from nodules from thyroid     C NONSPECIFIC PROCEDURE  1992    Cholecystectomy. abstracted 6/24/02.     C NONSPECIFIC PROCEDURE      Thyroidectomy. abstracted 6/24/02.     C NONSPECIFIC PROCEDURE      Dilatation & Curettage X 2. abstracted 6/24/02.     C NONSPECIFIC PROCEDURE      Cystoscopy. abstracted 6/24/02.     C NONSPECIFIC PROCEDURE      hysterctomy for uterine cancer.     CHOLECYSTECTOMY  1992     GYN SURGERY  2005    uterine cancer     HC COLONOSCOPY THRU STOMA, DIAGNOSTIC  10/2007    due q 5 yrs     HEAD & NECK SURGERY       right thyroid taken out       Social History   Substance Use Topics     Smoking status: Never Smoker     Smokeless tobacco: Never Used     Alcohol use No     Family History   Problem Relation Age of Onset     Family History Negative Mother      Other Cancer Mother      OSTEOPOROSIS Mother      Family History Negative Father      Other Cancer Father      Neurologic Disorder Sister      MS     DIABETES Brother      also, colon CA, colon surgery     DIABETES Sister      Hypertension Sister      Hyperlipidemia Sister      Anxiety Disorder Sister      MENTAL ILLNESS Sister      bi polar     Obesity Sister      from some pills too     DIABETES Brother      Colon Cancer Brother      Other Cancer Brother      Anxiety Disorder Brother      MENTAL ILLNESS Brother      schitzophenia     Obesity Brother      from pills too         Current Outpatient Prescriptions   Medication Sig Dispense Refill     omeprazole (PRILOSEC) 40 MG capsule TAKE 1 CAPSULE BY MOUTH DAILY 30-60 MINUTES BEFORE A MEAL. 90 capsule 2     nitroFURantoin, macrocrystal-monohydrate, (MACROBID) 100 MG capsule Take 1 capsule (100 mg) by mouth 2 times daily 14 capsule 0     hydrochlorothiazide (HYDRODIURIL) 25 MG tablet TAKE 1 TABLET (25 MG) BY MOUTH DAILY 90 tablet 1     IBANdronate (BONIVA) 150 MG tablet Take 1 tablet (150 mg) by mouth every 30 days 3 tablet 3     simvastatin (ZOCOR) 10 MG tablet TAKE ONE TABLET BY MOUTH EVERY NIGHT AT BEDTIME 90 tablet 3     lisinopril (PRINIVIL/ZESTRIL) 5 MG tablet Take 1 tablet (5 mg) by mouth daily 90 tablet 3     Cholecalciferol (VITAMIN D3 PO) Take 2,000 Units by mouth daily       Cyanocobalamin (CVS VITAMIN B12) 2000 MCG TABS        Ascorbic Acid (VITAMIN C CR) 1000 MG TBCR        loperamide (IMODIUM A-D) 2 MG tablet Take 2 mg by mouth as needed for diarrhea       Multiple Vitamins-Minerals (MULTIVITAMIN & MINERAL PO) Take 1 tablet by mouth daily       omega-3 fatty acids 1200 MG capsule Take 1 capsule by mouth daily 90  capsule      aspirin 81 MG tablet Take by mouth as needed          Reviewed and updated as needed this visit by clinical staff  Tobacco  Allergies  Meds  Med Hx  Surg Hx  Fam Hx  Soc Hx      Reviewed and updated as needed this visit by Provider         ROS:  CONSTITUTIONAL: NEGATIVE for fever, chills, change in weight  RESP: NEGATIVE for significant cough or SOB  CV: NEGATIVE for chest pain, palpitations or peripheral edema  : negative for, dysuria and frequency     OBJECTIVE:                                                    /68  Pulse 83  Temp 97.6  F (36.4  C) (Oral)  Wt 142 lb 1.6 oz (64.5 kg)  SpO2 99%  BMI 26.85 kg/m2  Body mass index is 26.85 kg/(m^2).   GENERAL: healthy, alert, well nourished, well hydrated, no distress  ABDOMEN: soft, no tenderness, no  hepatosplenomegaly, no masses, normal bowel sounds  BACK: no CVA tenderness, no paralumbar tenderness         ASSESSMENT/PLAN:                                                         (Z09) Follow-up exam after treatment  (primary encounter diagnosis)  Comment:completed Cipro for UTI   Plan: rpt UA reflex to Microscopic and Culture, Urine Microscopic-  No UTI, Call or return to clinic prn if these symtoms worsen, fail to improve as anticipated, or if new symptoms develop.           Monica Sultana MD  [facility]

## 2018-04-04 DIAGNOSIS — I10 ESSENTIAL HYPERTENSION: ICD-10-CM

## 2018-04-09 RX ORDER — HYDROCHLOROTHIAZIDE 25 MG/1
TABLET ORAL
Qty: 90 TABLET | Refills: 0 | Status: SHIPPED | OUTPATIENT
Start: 2018-04-09 | End: 2018-06-01

## 2018-05-21 DIAGNOSIS — E78.2 MIXED HYPERLIPIDEMIA: ICD-10-CM

## 2018-05-22 RX ORDER — SIMVASTATIN 10 MG
TABLET ORAL
Qty: 90 TABLET | Refills: 0 | Status: SHIPPED | OUTPATIENT
Start: 2018-05-22 | End: 2018-08-23

## 2018-05-22 NOTE — TELEPHONE ENCOUNTER
"      Requested Prescriptions   Pending Prescriptions Disp Refills     simvastatin (ZOCOR) 10 MG tablet [Pharmacy Med Name: SIMVASTATIN 10MG TAB 10 TAB] 90 tablet 3     Sig: TAKE ONE TABLET BY MOUTH EVERY NIGHT AT BEDTIME    Statins Protocol Passed    5/21/2018 10:06 AM       Passed - LDL on file in past 12 months    Recent Labs   Lab Test  05/30/17   0854   LDL  89            Passed - No abnormal creatine kinase in past 12 months    No lab results found.            Passed - Recent (12 mo) or future (30 days) visit within the authorizing provider's specialty    Patient had office visit in the last 12 months or has a visit in the next 30 days with authorizing provider or within the authorizing provider's specialty.  See \"Patient Info\" tab in inbasket, or \"Choose Columns\" in Meds & Orders section of the refill encounter.           Passed - Patient is age 18 or older       Passed - No active pregnancy on record       Passed - No positive pregnancy test in past 12 months          "

## 2018-06-01 ENCOUNTER — OFFICE VISIT (OUTPATIENT)
Dept: INTERNAL MEDICINE | Facility: CLINIC | Age: 78
End: 2018-06-01
Payer: COMMERCIAL

## 2018-06-01 VITALS
SYSTOLIC BLOOD PRESSURE: 118 MMHG | RESPIRATION RATE: 15 BRPM | HEIGHT: 61 IN | DIASTOLIC BLOOD PRESSURE: 70 MMHG | HEART RATE: 81 BPM | OXYGEN SATURATION: 97 % | TEMPERATURE: 98.2 F | BODY MASS INDEX: 26.96 KG/M2 | WEIGHT: 142.8 LBS

## 2018-06-01 DIAGNOSIS — E78.2 MIXED HYPERLIPIDEMIA: ICD-10-CM

## 2018-06-01 DIAGNOSIS — M81.0 OSTEOPOROSIS, UNSPECIFIED OSTEOPOROSIS TYPE, UNSPECIFIED PATHOLOGICAL FRACTURE PRESENCE: ICD-10-CM

## 2018-06-01 DIAGNOSIS — I10 ESSENTIAL HYPERTENSION: ICD-10-CM

## 2018-06-01 DIAGNOSIS — Z00.00 ENCOUNTER FOR ROUTINE ADULT HEALTH EXAMINATION WITHOUT ABNORMAL FINDINGS: Primary | ICD-10-CM

## 2018-06-01 LAB
ALBUMIN SERPL-MCNC: 3.7 G/DL (ref 3.4–5)
ALP SERPL-CCNC: 73 U/L (ref 40–150)
ALT SERPL W P-5'-P-CCNC: 32 U/L (ref 0–50)
ANION GAP SERPL CALCULATED.3IONS-SCNC: 8 MMOL/L (ref 3–14)
AST SERPL W P-5'-P-CCNC: 23 U/L (ref 0–45)
BILIRUB SERPL-MCNC: 0.8 MG/DL (ref 0.2–1.3)
BUN SERPL-MCNC: 15 MG/DL (ref 7–30)
CALCIUM SERPL-MCNC: 9.1 MG/DL (ref 8.5–10.1)
CHLORIDE SERPL-SCNC: 100 MMOL/L (ref 94–109)
CHOLEST SERPL-MCNC: 175 MG/DL
CO2 SERPL-SCNC: 28 MMOL/L (ref 20–32)
CREAT SERPL-MCNC: 0.59 MG/DL (ref 0.52–1.04)
GFR SERPL CREATININE-BSD FRML MDRD: >90 ML/MIN/1.7M2
GLUCOSE SERPL-MCNC: 90 MG/DL (ref 70–99)
HDLC SERPL-MCNC: 69 MG/DL
HGB BLD-MCNC: 12.2 G/DL (ref 11.7–15.7)
LDLC SERPL CALC-MCNC: 89 MG/DL
NONHDLC SERPL-MCNC: 106 MG/DL
POTASSIUM SERPL-SCNC: 3.8 MMOL/L (ref 3.4–5.3)
PROT SERPL-MCNC: 7.6 G/DL (ref 6.8–8.8)
SODIUM SERPL-SCNC: 136 MMOL/L (ref 133–144)
TRIGL SERPL-MCNC: 84 MG/DL
TSH SERPL DL<=0.005 MIU/L-ACNC: 1.05 MU/L (ref 0.4–4)
VIT B12 SERPL-MCNC: 892 PG/ML (ref 193–986)

## 2018-06-01 PROCEDURE — 80061 LIPID PANEL: CPT | Performed by: INTERNAL MEDICINE

## 2018-06-01 PROCEDURE — 80053 COMPREHEN METABOLIC PANEL: CPT | Performed by: INTERNAL MEDICINE

## 2018-06-01 PROCEDURE — 84443 ASSAY THYROID STIM HORMONE: CPT | Performed by: INTERNAL MEDICINE

## 2018-06-01 PROCEDURE — 99397 PER PM REEVAL EST PAT 65+ YR: CPT | Performed by: INTERNAL MEDICINE

## 2018-06-01 PROCEDURE — 85018 HEMOGLOBIN: CPT | Performed by: INTERNAL MEDICINE

## 2018-06-01 PROCEDURE — 82607 VITAMIN B-12: CPT | Performed by: INTERNAL MEDICINE

## 2018-06-01 PROCEDURE — 36415 COLL VENOUS BLD VENIPUNCTURE: CPT | Performed by: INTERNAL MEDICINE

## 2018-06-01 RX ORDER — LISINOPRIL 5 MG/1
5 TABLET ORAL DAILY
Qty: 90 TABLET | Refills: 3 | Status: SHIPPED | OUTPATIENT
Start: 2018-06-01 | End: 2019-06-03

## 2018-06-01 RX ORDER — IBANDRONATE SODIUM 150 MG/1
150 TABLET, FILM COATED ORAL
Qty: 3 TABLET | Refills: 3 | Status: SHIPPED | OUTPATIENT
Start: 2018-06-01 | End: 2019-06-03

## 2018-06-01 RX ORDER — HYDROCHLOROTHIAZIDE 25 MG/1
TABLET ORAL
Qty: 90 TABLET | Refills: 3 | Status: SHIPPED | OUTPATIENT
Start: 2018-06-01 | End: 2019-06-03

## 2018-06-01 NOTE — PROGRESS NOTES
SUBJECTIVE:   Sophia Olivarez is a 77 year old female who presents for Preventive Visit.    Are you in the first 12 months of your Medicare Part B coverage?  No    Healthy Habits:    Do you get at least three servings of calcium containing foods daily (dairy, green leafy vegetables, etc.)? yes    Amount of exercise or daily activities, outside of work: 6 day(s) per week    Problems taking medications regularly No    Medication side effects: No    Have you had an eye exam in the past two years? yes    Do you see a dentist twice per year? yes    Do you have sleep apnea, excessive snoring or daytime drowsiness?no      Ability to successfully perform activities of daily living: Yes, no assistance needed    Home safety:  none identified     Hearing impairment: No    Fall risk:  Fallen 2 or more times in the past year?: No  Any fall with injury in the past year?: No        COGNITIVE SCREEN  1) Repeat 3 items (Banana, Sunrise, Chair)    2) Clock draw: NORMAL  3) 3 item recall: Recalls 3 objects  Results: 3 items recalled: COGNITIVE IMPAIRMENT LESS LIKELY    Mini-CogTM Copyright S Ghislaine. Licensed by the author for use in [facility]; reprinted with permission (thomas@[facility]). All rights reserved.           Reviewed and updated as needed this visit by clinical staff  Tobacco  Allergies  Meds  Med Hx  Surg Hx  Fam Hx  Soc Hx        Reviewed and updated as needed this visit by Provider        Past Medical History:   Diagnosis Date     Benign neoplasm of skin, site unspecified     sees derm.     Calculus of kidney     3 passed spont.     Chronic subinvolution of uterus      Disorder of bone and cartilage, unspecified     osteopenia     Dyspepsia and other specified disorders of function of stomach     dyspepsia     Generalized osteoarthrosis, unspecified site      H/O thyroid nodule     f/u with endocrinologist     Hematuria     microscopic     HTN (hypertension)      Malignant neoplasm of corpus uteri,  except isthmus (H)     surgery and radiation rx.     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      Nontoxic multinodular goiter     sees endocrine     Other and unspecified hyperlipidemia        Past Surgical History:   Procedure Laterality Date     ABDOMEN SURGERY  2005    uterine cancer Hysterectiomy     BIOPSY      many times from nodules from thyroid     C NONSPECIFIC PROCEDURE  1992    Cholecystectomy. abstracted 6/24/02.     C NONSPECIFIC PROCEDURE      Thyroidectomy. abstracted 6/24/02.     C NONSPECIFIC PROCEDURE      Dilatation & Curettage X 2. abstracted 6/24/02.     C NONSPECIFIC PROCEDURE      Cystoscopy. abstracted 6/24/02.     C NONSPECIFIC PROCEDURE      hysterctomy for uterine cancer.     CHOLECYSTECTOMY  1992     GYN SURGERY  2005    uterine cancer     HC COLONOSCOPY THRU STOMA, DIAGNOSTIC  10/2007    due q 5 yrs     HEAD & NECK SURGERY      right thyroid taken out       Current Outpatient Prescriptions   Medication Sig Dispense Refill     Ascorbic Acid (VITAMIN C CR) 1000 MG TBCR        aspirin 81 MG tablet Take by mouth as needed        Cholecalciferol (VITAMIN D3 PO) Take 2,000 Units by mouth daily       Cyanocobalamin (CVS VITAMIN B12) 2000 MCG TABS        hydrochlorothiazide (HYDRODIURIL) 25 MG tablet TAKE 1 TABLET (25 MG) BY MOUTH DAILY 90 tablet 3     IBANdronate (BONIVA) 150 MG tablet Take 1 tablet (150 mg) by mouth every 30 days 3 tablet 3     lisinopril (PRINIVIL/ZESTRIL) 5 MG tablet Take 1 tablet (5 mg) by mouth daily 90 tablet 3     loperamide (IMODIUM A-D) 2 MG tablet Take 2 mg by mouth as needed for diarrhea       Multiple Vitamins-Minerals (MULTIVITAMIN & MINERAL PO) Take 1 tablet by mouth daily       omega-3 fatty acids 1200 MG capsule Take 1 capsule by mouth daily 90 capsule      simvastatin (ZOCOR) 10 MG tablet  TAKE ONE TABLET BY MOUTH EVERY NIGHT AT BEDTIME 90 tablet 0     [DISCONTINUED] hydrochlorothiazide (HYDRODIURIL) 25 MG tablet TAKE 1 TABLET (25 MG) BY MOUTH DAILY 90 tablet 0     [DISCONTINUED] IBANdronate (BONIVA) 150 MG tablet Take 1 tablet (150 mg) by mouth every 30 days 3 tablet 3     [DISCONTINUED] lisinopril (PRINIVIL/ZESTRIL) 5 MG tablet Take 1 tablet (5 mg) by mouth daily 90 tablet 3       Family History   Problem Relation Age of Onset     Family History Negative Mother      Other Cancer Mother      OSTEOPOROSIS Mother      Family History Negative Father      Other Cancer Father      Neurologic Disorder Sister      MS     DIABETES Brother      also, colon CA, colon surgery     DIABETES Sister      Hypertension Sister      Hyperlipidemia Sister      Anxiety Disorder Sister      MENTAL ILLNESS Sister      bi polar     Obesity Sister      from some pills too     DIABETES Brother      Colon Cancer Brother      Other Cancer Brother      Anxiety Disorder Brother      MENTAL ILLNESS Brother      schitzophenia     Obesity Brother      from pills too       Social History   Substance Use Topics     Smoking status: Never Smoker     Smokeless tobacco: Never Used     Alcohol use No       If you drink alcohol do you typically have >3 drinks per day or >7 drinks per week? No                        Today's PHQ-2 Score:   PHQ-2 ( 1999 [employer]) 6/1/2018 2/26/2018   Q1: Little interest or pleasure in doing things 0 0   Q2: Feeling down, depressed or hopeless 0 0   PHQ-2 Score 0 0   Q1: Little interest or pleasure in doing things - -   Q2: Feeling down, depressed or hopeless - -   PHQ-2 Score - -       Do you feel safe in your environment - Yes    Do you have a Health Care Directive?: Yes: Advance Directive has been received and scanned.    Current providers sharing in care for this patient include:   Patient Care Team:  Monica Sultana MD as PCP - General    The following health maintenance items are reviewed in Epic  "and correct as of today:  Health Maintenance   Topic Date Due     ADVANCE DIRECTIVE PLANNING Q5 YRS  05/10/2017     FALL RISK ASSESSMENT  05/30/2018     TSH Q1 YEAR  06/20/2018     INFLUENZA VACCINE (Season Ended) 09/01/2018     TETANUS IMMUNIZATION (SYSTEM ASSIGNED)  03/23/2019     LIPID SCREEN Q5 YR FEMALE (SYSTEM ASSIGNED)  05/30/2022     COLON CANCER SCREEN (SYSTEM ASSIGNED)  05/21/2023     DEXA SCAN SCREENING (SYSTEM ASSIGNED)  Completed     PNEUMOCOCCAL  Completed          ROS:  CONSTITUTIONAL: NEGATIVE for fever, chills, change in weight  INTEGUMENTARY/SKIN: NEGATIVE for worrisome rashes, moles or lesions  EYES: NEGATIVE for vision changes or irritation  ENT/MOUTH: NEGATIVE for ear, mouth and throat problems  RESP: NEGATIVE for significant cough or SOB  BREAST: NEGATIVE for masses, tenderness or discharge  CV: NEGATIVE for chest pain, palpitations or peripheral edema  GI: NEGATIVE for nausea, abdominal pain, heartburn, or change in bowel habits  : NEGATIVE for frequency, dysuria, or hematuria  MUSCULOSKELETAL: NEGATIVE for significant arthralgias or myalgia  NEURO: NEGATIVE for weakness, dizziness or paresthesias  ENDOCRINE: NEGATIVE for temperature intolerance, skin/hair changes  HEME: NEGATIVE for bleeding problems  PSYCHIATRIC: NEGATIVE for changes in mood or affect    OBJECTIVE:   /66  Pulse 81  Temp 98.2  F (36.8  C) (Oral)  Resp 15  Ht 5' 1\" (1.549 m)  Wt 142 lb 12.8 oz (64.8 kg)  SpO2 97%  BMI 26.98 kg/m2 Estimated body mass index is 26.98 kg/(m^2) as calculated from the following:    Height as of this encounter: 5' 1\" (1.549 m).    Weight as of this encounter: 142 lb 12.8 oz (64.8 kg).  EXAM:   GENERAL: healthy, alert and no distress  EYES: Eyes grossly normal to inspection, PERRL and conjunctivae and sclerae normal  HENT: ear canals and TM's normal, nose and mouth without ulcers or lesions  NECK: no adenopathy, no asymmetry, masses, or scars and thyroid normal to palpation  RESP: lungs " "clear to auscultation - no rales, rhonchi or wheezes  BREAST: normal without masses, tenderness or nipple discharge and no palpable axillary masses or adenopathy  CV: regular rate and rhythm, normal S1 S2, no S3 or S4, no murmur, click or rub, no peripheral edema and peripheral pulses strong  ABDOMEN: soft, nontender, no hepatosplenomegaly, no masses and bowel sounds normal  MS: no gross musculoskeletal defects noted, no edema  NEURO: Normal strength and tone, mentation intact and speech normal  PSYCH: mentation appears normal, affect normal/bright    ASSESSMENT / PLAN:     (Z00.00) Encounter for routine adult health examination without abnormal findings  (primary encounter diagnosis)  Plan: Hemoglobin, Comprehensive metabolic panel,         Lipid panel reflex to direct LDL Fasting, TSH         with free T4 reflex, Vitamin B12            (M81.0) Osteoporosis, unspecified osteoporosis type, unspecified pathological fracture presence  Plan: IBANdronate (BONIVA) 150 MG tablet refilled.explained clearly about the medication,insructions and side effects.  Check  DX Hip/Pelvis/Spine            (I10) Essential hypertension  Comment: BP well controlled  Plan: hydrochlorothiazide (HYDRODIURIL) 25 MG tablet, lisinopril (PRINIVIL/ZESTRIL) 5 MG tablet refilled.explained clearly about the medication,insructions and side effects.Advised to follow low salt diet and exercise and f/u in 6 mths.        (E78.2) Mixed hyperlipidemia  Plan: check lipid panel, continue simvastatin         End of Life Planning:  Patient currently has an advanced directive: Yes: Advance Directive has been received and scanned.    COUNSELING:  Reviewed preventive health counseling, as reflected in patient instructions       Regular exercise       Healthy diet/nutrition        Estimated body mass index is 26.98 kg/(m^2) as calculated from the following:    Height as of this encounter: 5' 1\" (1.549 m).    Weight as of this encounter: 142 lb 12.8 oz (64.8 " kg).       reports that she has never smoked. She has never used smokeless tobacco.      Appropriate preventive services were discussed with this patient, including applicable screening as appropriate for cardiovascular disease, diabetes, osteopenia/osteoporosis, and glaucoma.  As appropriate for age/gender, discussed screening for colorectal cancer, prostate cancer, breast cancer, and cervical cancer. Checklist reviewing preventive services available has been given to the patient.    Reviewed patients plan of care and provided an AVS. The Basic Care Plan (routine screening as documented in Health Maintenance) for Sophia meets the Care Plan requirement. This Care Plan has been established and reviewed with the Patient.    Counseling Resources:  ATP IV Guidelines  Pooled Cohorts Equation Calculator  Breast Cancer Risk Calculator  FRAX Risk Assessment  ICSI Preventive Guidelines  Dietary Guidelines for Americans, 2010  USDA's MyPlate  ASA Prophylaxis  Lung CA Screening    Monica Sultana MD  [facility]

## 2018-06-01 NOTE — MR AVS SNAPSHOT
After Visit Summary   6/1/2018    Sophia Olivarez    MRN: [id number]           Patient Information     Date Of Birth          1940        Visit Information        Provider Department      6/1/2018 8:00 AM Monica Sultana MD [facility]        Today's Diagnoses     Encounter for routine adult health examination without abnormal findings    -  1    Osteoporosis, unspecified osteoporosis type, unspecified pathological fracture presence        Essential hypertension        Mixed hyperlipidemia          Care Instructions      Preventive Health Recommendations    Female Ages 65 +    Yearly exam:     See your health care provider every year in order to  o Review health changes.   o Discuss preventive care.    o Review your medicines if your doctor has prescribed any.      You no longer need a yearly Pap test unless you've had an abnormal Pap test in the past 10 years. If you have vaginal symptoms, such as bleeding or discharge, be sure to talk with your provider about a Pap test.      Every 1 to 2 years, have a mammogram.  If you are over 69, talk with your health care provider about whether or not you want to continue having screening mammograms.      Every 10 years, have a colonoscopy. Or, have a yearly FIT test (stool test). These exams will check for colon cancer.       Have a cholesterol test every 5 years, or more often if your doctor advises it.       Have a diabetes test (fasting glucose) every three years. If you are at risk for diabetes, you should have this test more often.       At age 65, have a bone density scan (DEXA) to check for osteoporosis (brittle bone disease).    Shots:    Get a flu shot each year.    Get a tetanus shot every 10 years.    Talk to your doctor about your pneumonia vaccines. There are now two you should receive - Pneumovax (PPSV 23) and Prevnar (PCV 13).    Talk to your doctor about the shingles vaccine.    Talk to your doctor about the  hepatitis B vaccine.    Nutrition:     Eat at least 5 servings of fruits and vegetables each day.      Eat whole-grain bread, whole-wheat pasta and brown rice instead of white grains and rice.      Talk to your provider about Calcium and Vitamin D.     Lifestyle    Exercise at least 150 minutes a week (30 minutes a day, 5 days a week). This will help you control your weight and prevent disease.      Limit alcohol to one drink per day.      No smoking.       Wear sunscreen to prevent skin cancer.       See your dentist twice a year for an exam and cleaning.      See your eye doctor every 1 to 2 years to screen for conditions such as glaucoma, macular degeneration and cataracts.          Follow-ups after your visit        Future tests that were ordered for you today     Open Future Orders        Priority Expected Expires Ordered    DX Hip/Pelvis/Spine Routine  6/1/2019 6/1/2018            Who to contact     If you have questions or need follow up information about today's clinic visit or your schedule please contact [facility] directly at [phone].  Normal or non-critical lab and imaging results will be communicated to you by [employer], letter or phone within 4 business days after the clinic has received the results. If you do not hear from us within 7 days, please contact the clinic through [employer] or phone. If you have a critical or abnormal lab result, we will notify you by phone as soon as possible.  Submit refill requests through [employer] or call your pharmacy and they will forward the refill request to us. Please allow 3 business days for your refill to be completed.          Additional Information About Your Visit        [employer] Information     [employer] gives you secure access to your electronic health record. If you see a primary care provider, you can also send messages to your care team and make appointments. If you have questions, please call your primary care clinic.  If you do not have a  "primary care provider, please call [phone] and they will assist you.        Care EveryWhere ID     This is your Care EveryWhere ID. This could be used by other organizations to access your [place] medical records  [id number]        Your Vitals Were     Pulse Temperature Respirations Height Pulse Oximetry BMI (Body Mass Index)    81 98.2  F (36.8  C) (Oral) 15 5' 1\" (1.549 m) 97% 26.98 kg/m2       Blood Pressure from Last 3 Encounters:   06/01/18 118/70   02/26/18 108/68   02/10/18 119/71    Weight from Last 3 Encounters:   06/01/18 142 lb 12.8 oz (64.8 kg)   02/26/18 142 lb 1.6 oz (64.5 kg)   02/10/18 141 lb 6.4 oz (64.1 kg)              We Performed the Following     Comprehensive metabolic panel     Hemoglobin     Lipid panel reflex to direct LDL Fasting     TSH with free T4 reflex     Vitamin B12          Today's Medication Changes          These changes are accurate as of 6/1/18 12:35 PM.  If you have any questions, ask your nurse or doctor.               Stop taking these medicines if you haven't already. Please contact your care team if you have questions.     nitroFURantoin (macrocrystal-monohydrate) 100 MG capsule   Commonly known as:  MACROBID   Stopped by:  Monica Sultana MD           omeprazole 40 MG capsule   Commonly known as:  priLOSEC   Stopped by:  Monica Sultana MD                Where to get your medicines      These medications were sent to [facility] - [address] [address]     Phone:  [phone]     hydrochlorothiazide 25 MG tablet    IBANdronate 150 MG tablet    lisinopril 5 MG tablet                Primary Care Provider Office Phone # Fax #    Monica Sultana [phone]       [address] [facility] [address]        Equal Access to Services     AMR[facility] WOLFE [facility]: Hadii lakeisha ku hadasho Soomaali, waaxda luqadaha, qaybta kaalmada ariana, jeffery santa " la'leonela taylor. So [facility] [phone].    ATENCIÓN: Si habla danna, tiene a sousa disposición servicios gratuitos de asistencia lingüística. Rich land [phone].    We comply with applicable federal civil rights laws and [place] laws. We do not discriminate on the basis of race, color, national origin, age, disability, sex, sexual orientation, or gender identity.            Thank you!     Thank you for choosing [facility]  for your care. Our goal is always to provide you with excellent care. Hearing back from our patients is one way we can continue to improve our services. Please take a few minutes to complete the written survey that you may receive in the mail after your visit with us. Thank you!             Your Updated Medication List - Protect others around you: Learn how to safely use, store and throw away your medicines at www.disposemymeds.org.          This list is accurate as of 6/1/18 12:35 PM.  Always use your most recent med list.                   Brand Name Dispense Instructions for use Diagnosis    aspirin 81 MG tablet      Take by mouth as needed        CVS VITAMIN B12 2000 MCG Tabs   Generic drug:  Cyanocobalamin           hydrochlorothiazide 25 MG tablet    HYDRODIURIL    90 tablet    TAKE 1 TABLET (25 MG) BY MOUTH DAILY    Essential hypertension       IBANdronate 150 MG tablet    BONIVA    3 tablet    Take 1 tablet (150 mg) by mouth every 30 days    Osteoporosis, unspecified osteoporosis type, unspecified pathological fracture presence       lisinopril 5 MG tablet    PRINIVIL/ZESTRIL    90 tablet    Take 1 tablet (5 mg) by mouth daily    Essential hypertension       loperamide 2 MG tablet    IMODIUM A-D     Take 2 mg by mouth as needed for diarrhea        MULTIVITAMIN & MINERAL PO      Take 1 tablet by mouth daily        omega-3 fatty acids 1200 MG capsule     90 capsule    Take 1 capsule by mouth daily        simvastatin 10 MG tablet    ZOCOR    90 tablet    TAKE ONE TABLET BY MOUTH  EVERY NIGHT AT BEDTIME    Mixed hyperlipidemia       vitamin C CR 1000 MG Tbcr           VITAMIN D3 PO      Take 2,000 Units by mouth daily

## 2018-06-05 ENCOUNTER — MYC MEDICAL ADVICE (OUTPATIENT)
Dept: INTERNAL MEDICINE | Facility: CLINIC | Age: 78
End: 2018-06-05

## 2018-06-05 DIAGNOSIS — R10.11 RUQ ABDOMINAL PAIN: Primary | ICD-10-CM

## 2018-06-08 ENCOUNTER — HOSPITAL ENCOUNTER (OUTPATIENT)
Dept: CT IMAGING | Facility: CLINIC | Age: 78
Discharge: HOME | End: 2018-06-08
Attending: INTERNAL MEDICINE | Admitting: INTERNAL MEDICINE
Payer: COMMERCIAL

## 2018-06-08 DIAGNOSIS — R10.11 RUQ ABDOMINAL PAIN: ICD-10-CM

## 2018-06-08 PROCEDURE — 74150 CT ABDOMEN W/O CONTRAST: CPT

## 2018-06-11 ENCOUNTER — MYC MEDICAL ADVICE (OUTPATIENT)
Dept: INTERNAL MEDICINE | Facility: CLINIC | Age: 78
End: 2018-06-11

## 2018-06-13 NOTE — TELEPHONE ENCOUNTER
ok to continue  81 mg of aspirin per day and tums when needed and Imodium and lactaid pills when needed.  ok to continue  row machine and bicycle. Pl inform pt

## 2018-06-20 ENCOUNTER — RADIANT APPOINTMENT (OUTPATIENT)
Dept: BONE DENSITY | Facility: CLINIC | Age: 78
End: 2018-06-20
Attending: INTERNAL MEDICINE
Payer: COMMERCIAL

## 2018-06-20 ENCOUNTER — ALLIED HEALTH/NURSE VISIT (OUTPATIENT)
Dept: NURSING | Facility: CLINIC | Age: 78
End: 2018-06-20
Payer: COMMERCIAL

## 2018-06-20 DIAGNOSIS — M81.0 OSTEOPOROSIS, UNSPECIFIED OSTEOPOROSIS TYPE, UNSPECIFIED PATHOLOGICAL FRACTURE PRESENCE: ICD-10-CM

## 2018-06-20 DIAGNOSIS — Z23 NEED FOR SHINGLES VACCINE: Primary | ICD-10-CM

## 2018-06-20 PROCEDURE — 77085 DXA BONE DENSITY AXL VRT FX: CPT | Performed by: INTERNAL MEDICINE

## 2018-06-20 PROCEDURE — 90750 HZV VACC RECOMBINANT IM: CPT

## 2018-06-20 PROCEDURE — 90471 IMMUNIZATION ADMIN: CPT

## 2018-06-20 PROCEDURE — 99207 ZZC NO CHARGE NURSE ONLY: CPT

## 2018-06-20 NOTE — MR AVS SNAPSHOT
After Visit Summary   6/20/2018    Sophia Olivarez    MRN: [id number]           Patient Information     Date Of Birth          1940        Visit Information        Provider Department      6/20/2018 8:30 AM RI IM NURSE [facility]        Today's Diagnoses     Need for shingles vaccine    -  1       Follow-ups after your visit        Your next 10 appointments already scheduled     Jun 20, 2018  8:30 AM CDT   Nurse Only with RI IM NURSE   [facility] ([facility])    [address]  [facility] [id number] [phone]              Who to contact     If you have questions or need follow up information about today's clinic visit or your schedule please contact [facility] directly at [phone].  Normal or non-critical lab and imaging results will be communicated to you by MyChart, letter or phone within 4 business days after the clinic has received the results. If you do not hear from us within 7 days, please contact the clinic through MyChart or phone. If you have a critical or abnormal lab result, we will notify you by phone as soon as possible.  Submit refill requests through [employer] or call your pharmacy and they will forward the refill request to us. Please allow 3 business days for your refill to be completed.          Additional Information About Your Visit        MyChart Information     [employer] gives you secure access to your electronic health record. If you see a primary care provider, you can also send messages to your care team and make appointments. If you have questions, please call your primary care clinic.  If you do not have a primary care provider, please call [phone] and they will assist you.        Care EveryWhere ID     This is your Care EveryWhere ID. This could be used by other organizations to access your [place] medical records  [id number]         Blood Pressure from Last 3 Encounters:    06/01/18 118/70   02/26/18 108/68   02/10/18 119/71    Weight from Last 3 Encounters:   06/01/18 142 lb 12.8 oz (64.8 kg)   02/26/18 142 lb 1.6 oz (64.5 kg)   02/10/18 141 lb 6.4 oz (64.1 kg)              We Performed the Following     ZOSTER VACCINE RECOMBINANT ADJUVANTED IM NJX        Primary Care Provider Office Phone # Fax #    Monica [address] [phone]       [address] [facility] [address]        Equal Access to Services     [facility]: Hadii aad ku hadasho Sonavali, waaxda luqadaha, qaybta kaalmada adekeyonyamyron, jeffery garcia . So [facility] [phone].    ATENCIÓN: Si habla español, tiene a sousa disposición servicios gratuitos de asistencia lingüística. Llame al [phone].    We comply with applicable federal civil rights laws and [place] laws. We do not discriminate on the basis of race, color, national origin, age, disability, sex, sexual orientation, or gender identity.            Thank you!     Thank you for choosing [facility]  for your care. Our goal is always to provide you with excellent care. Hearing back from our patients is one way we can continue to improve our services. Please take a few minutes to complete the written survey that you may receive in the mail after your visit with us. Thank you!             Your Updated Medication List - Protect others around you: Learn how to safely use, store and throw away your medicines at www.disposemymeds.org.          This list is accurate as of 6/20/18  8:05 AM.  Always use your most recent med list.                   Brand Name Dispense Instructions for use Diagnosis    aspirin 81 MG tablet      Take by mouth as needed        CVS VITAMIN B12 2000 MCG Tabs   Generic drug:  Cyanocobalamin           hydrochlorothiazide 25 MG tablet    HYDRODIURIL    90 tablet    TAKE 1 TABLET (25 MG) BY MOUTH DAILY    Essential hypertension       IBANdronate 150 MG tablet    BONIVA    3 tablet     Take 1 tablet (150 mg) by mouth every 30 days    Osteoporosis, unspecified osteoporosis type, unspecified pathological fracture presence       lisinopril 5 MG tablet    PRINIVIL/ZESTRIL    90 tablet    Take 1 tablet (5 mg) by mouth daily    Essential hypertension       loperamide 2 MG tablet    IMODIUM A-D     Take 2 mg by mouth as needed for diarrhea        MULTIVITAMIN & MINERAL PO      Take 1 tablet by mouth daily        omega-3 fatty acids 1200 MG capsule     90 capsule    Take 1 capsule by mouth daily        simvastatin 10 MG tablet    ZOCOR    90 tablet    TAKE ONE TABLET BY MOUTH EVERY NIGHT AT BEDTIME    Mixed hyperlipidemia       vitamin C CR 1000 MG Tbcr           VITAMIN D3 PO      Take 2,000 Units by mouth daily

## 2018-06-20 NOTE — NURSING NOTE
Screening Questionnaire for Adult Immunization    Are you sick today?   No   Do you have allergies to medications, food, a vaccine component or latex?   No   Have you ever had a serious reaction after receiving a vaccination?   No   Do you have a long-term health problem with heart disease, lung disease, asthma, kidney disease, metabolic disease (e.g. diabetes), anemia, or other blood disorder?   No   Do you have cancer, leukemia, HIV/AIDS, or any other immune system problem?   No   In the past 3 months, have you taken medications that affect  your immune system, such as prednisone, other steroids, or anticancer drugs; drugs for the treatment of rheumatoid arthritis, Crohn s disease, or psoriasis; or have you had radiation treatments?   No   Have you had a seizure, or a brain or other nervous system problem?   No   During the past year, have you received a transfusion of blood or blood     products, or been given immune (gamma) globulin or antiviral drug?   No   For women: Are you pregnant or is there a chance you could become        pregnant during the next month?   No   Have you received any vaccinations in the past 4 weeks?   No     Immunization questionnaire answers were all negative.        Per orders of Dr. Sultana, injection of Shingles vaccine given by Kelli Hsu. Patient instructed to remain in clinic for 15 minutes afterwards, and to report any adverse reaction to me immediately.    Prior to injection verified patient identity using patient's name and date of birth.  Due to injection administration, patient instructed to remain in clinic for 15 minutes  afterwards, and to report any adverse reaction to me immediately.         Screening performed by Kelli Hsu on 6/20/2018 at 8:04 AM.

## 2018-06-29 ENCOUNTER — TRANSFERRED RECORDS (OUTPATIENT)
Dept: HEALTH INFORMATION MANAGEMENT | Facility: CLINIC | Age: 78
End: 2018-06-29

## 2018-07-02 ENCOUNTER — TRANSFERRED RECORDS (OUTPATIENT)
Dept: HEALTH INFORMATION MANAGEMENT | Facility: CLINIC | Age: 78
End: 2018-07-02

## 2018-07-02 LAB
GLUCOSE SERPL-MCNC: 88 MG/DL (ref 65–99)
HBA1C MFR BLD: 5.7 % (ref 4–6)
TSH SERPL-ACNC: 1 UIU/ML (ref 0.3–5)

## 2018-07-25 ENCOUNTER — OFFICE VISIT (OUTPATIENT)
Dept: VASCULAR SURGERY | Facility: CLINIC | Age: 78
End: 2018-07-25
Payer: COMMERCIAL

## 2018-07-25 DIAGNOSIS — Z53.9 ERRONEOUS ENCOUNTER--DISREGARD: Primary | ICD-10-CM

## 2018-07-25 PROCEDURE — 99214 OFFICE O/P EST MOD 30 MIN: CPT | Performed by: SURGERY

## 2018-07-25 NOTE — PROGRESS NOTES
"[facility] Office Note    Sophia Olivarez is a pleasant 77-year-old female referred by Dr. Monica Sultana for evaluation of enlarging left leg varicose veins, right posterior thigh pain and bilateral, ongoing lower extremity swelling.  I have seen her on several occasions in the past, the last on 7/12/2016 and the first in 2005.  She states that  she has noticed enlargement of a \"lump\" behind her left knee and that she is getting pain in the right posterior thigh when she sits in the car for any extended period of time.  She has not had erythema of her lower extremities, pleuritic chest pain or shortness of breath.  She states she  and her  walk nearly a mile a day.  She was wearing compression hose in the past but states she has not worn them recently, admitting that she gets swelling of her legs on a daily basis but that the swellings tends to resolve by morning.    Past medical history  Medical: Hyperlipidemia, osteoarthritis, hypertension, skin cancer, nonspecific myalgias and myositis, kidney stones, irritable bowel syndrome  Surgical: Cholecystectomy, thyroidectomy, hysterectomy for uterine cancer 2005    Medicines: Vitamin C, vitamin D3, vitamin B12, HydroDIURIL, Boniva, lisinopril, Imodium as needed, multivitamin, omega-3 fatty acids and Zocor    Allergies:  Aspirin, atorvastatin, codeine, meperidine    Social history: She is a non-smoker and does not use alcohol    12 point review of systems was reviewed and updated and is significant only for issues  noted in his present illness and past medical history.    Physical exam  General: Pleasant female in no acute distress.  She is 5 feet 1 inches tall and weighs 142 pounds  HEENT: Normocephalic, atraumatic.  EOMI.  External ears and nose normal.  She wears corrective lenses.  Respiratory: Normal respiratory effort  Cardiovascular: Pulse is regular  Musculoskeletal: Gait and station are normal.  The joints of her fingers and toes are without " deformity.  There is no cyanosis of her nailbeds.  Neurologic: Grossly normal  Psychiatric: Judgment, insight, mood and affect are normal  Extremities: There is a 4 mm varicosity coursing down the anterior aspect of the mid right thigh to the knee.  Close inspection of the posterior aspect of the right thigh does not reveal any significant visible cutaneous varicose veins.  There are no venous stasis changes right leg though she has 2-3+ edema.  There is early lipodermatosclerosis with firmness of the skin on the distal third of the right leg.  On the left lower extremity, she has greater than 10 mm varicosities coursing from the popliteal crease laterally, down the posterior aspect of the left calf.  This is definitely enlarged since I last saw her.  There are no venous stasis changes.  She has 2+ edema of the left ankle.  She has 2-3+ dorsalis pedis and posterior tibial pulses bilaterally.    Her last ultrasound from 2009 was reviewed.  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  There was no onset of her right lower extremity at that time.    Impression  New right posterior thigh pain and definite enlargement of left lower extremity varicose veins.  I feel that is reasonable to obtain an ultrasound to assess for underlying valve incompetence that may be contributing to her pain.  We will also assess for venous causes for her lower extremity swelling.    In the absence of significant back pain or pain radiating down the buttock, I am not sure whether the right posterior thigh pain represents discogenic pain or whether it could be possibly related venous disease.  The ultrasound should give us the answers.    NADIA Velasquez MD    Copy this to Dr. Sultana

## 2018-07-25 NOTE — MR AVS SNAPSHOT
After Visit Summary   7/25/2018    Sophia Olivarez    MRN: [id number]           Patient Information     Date Of Birth          1940        Visit Information        Provider Department      7/25/2018 8:30 AM Adams Velasquez MD [facility]      Today's Diagnoses     ERRONEOUS ENCOUNTER--DISREGARD    -  1       Follow-ups after your visit        Your next 10 appointments already scheduled     Aug 29, 2018  8:30 AM CDT   Ultrasound Bilaterally with  [facility]   [facility]    [address]  [facility] [id number] [phone]            Aug 29, 2018 10:30 AM CDT   Ultrasound Results with Adams Velasquez MD   [facility] ([facility])    [address]  [facility] [id number] [phone]              Who to contact     If you have questions or need follow up information about today's clinic visit or your schedule please contact [facility] directly at [phone].  Normal or non-critical lab and imaging results will be communicated to you by MyChart, letter or phone within 4 business days after the clinic has received the results. If you do not hear from us within 7 days, please contact the clinic through MyChart or phone. If you have a critical or abnormal lab result, we will notify you by phone as soon as possible.  Submit refill requests through [email or web address] or call your pharmacy and they will forward the refill request to us. Please allow 3 business days for your refill to be completed.          Additional Information About Your Visit        MyChart Information     [email or web address] gives you secure access to your electronic health record. If you see a primary care provider, you can also send messages to your care team and make appointments. If you have questions,  please call your primary care clinic.  If you do not have a primary care provider, please call [phone] and they will assist you.        Care EveryWhere ID     This is your Care EveryWhere ID. This could be used by other organizations to access your [place] medical records  [id number]         Blood Pressure from Last 3 Encounters:   06/01/18 118/70   02/26/18 108/68   02/10/18 119/71    Weight from Last 3 Encounters:   06/01/18 64.8 kg (142 lb 12.8 oz)   02/26/18 64.5 kg (142 lb 1.6 oz)   02/10/18 64.1 kg (141 lb 6.4 oz)              Today, you had the following     No orders found for display       Primary Care Provider Office Phone # Fax #    Ignaciomaritzaelaine RIP Sultana [phone]       [address] [facility] [address]        Equal Access to Services     [facility]: Hadii aad ku hadasho Soomaali, waaxda luqadaha, qaybta kaalmada adeegyada, jeffery garcia . So [facility] [phone].    ATENCIÓN: Si habla español, tiene a sousa disposición servicios gratuitos de asistencia lingüística. LlGal[facility] [phone].    We comply with applicable federal civil rights laws and [place] laws. We do not discriminate on the basis of race, color, national origin, age, disability, sex, sexual orientation, or gender identity.            Thank you!     Thank you for choosing [facility]  for your care. Our goal is always to provide you with excellent care. Hearing back from our patients is one way we can continue to improve our services. Please take a few minutes to complete the written survey that you may receive in the mail after your visit with us. Thank you!             Your Updated Medication List - Protect others around you: Learn how to safely use, store and throw away your medicines at www.disposemymeds.org.          This list is accurate as of 7/25/18 11:59 PM.  Always use your most recent med list.                   Brand Name Dispense Instructions  for use Diagnosis    aspirin 81 MG tablet      Take by mouth as needed        CVS VITAMIN B12 2000 MCG Tabs   Generic drug:  Cyanocobalamin           hydrochlorothiazide 25 MG tablet    HYDRODIURIL    90 tablet    TAKE 1 TABLET (25 MG) BY MOUTH DAILY    Essential hypertension       IBANdronate 150 MG tablet    BONIVA    3 tablet    Take 1 tablet (150 mg) by mouth every 30 days    Osteoporosis, unspecified osteoporosis type, unspecified pathological fracture presence       lisinopril 5 MG tablet    PRINIVIL/ZESTRIL    90 tablet    Take 1 tablet (5 mg) by mouth daily    Essential hypertension       loperamide 2 MG tablet    IMODIUM A-D     Take 2 mg by mouth as needed for diarrhea        MULTIVITAMIN & MINERAL PO      Take 1 tablet by mouth daily        omega-3 fatty acids 1200 MG capsule     90 capsule    Take 1 capsule by mouth daily        simvastatin 10 MG tablet    ZOCOR    90 tablet    TAKE ONE TABLET BY MOUTH EVERY NIGHT AT BEDTIME    Mixed hyperlipidemia       vitamin C CR 1000 MG Tbcr           VITAMIN D3 PO      Take 2,000 Units by mouth daily

## 2018-08-23 DIAGNOSIS — E78.2 MIXED HYPERLIPIDEMIA: ICD-10-CM

## 2018-08-24 NOTE — TELEPHONE ENCOUNTER
"Requested Prescriptions   Pending Prescriptions Disp Refills     simvastatin (ZOCOR) 10 MG tablet [Pharmacy Med Name: SIMVASTATIN 10MG TAB 10 TAB]  Last Written Prescription Date:  5/22/18  Last Fill Quantity: 90 TABLET,  # refills: 0   Last office visit: 6/1/2018 with prescribing provider:  MARGARET   Future Office Visit:     90 tablet 0     Sig: TAKE ONE TABLET BY MOUTH EVERY NIGHT AT BEDTIME    Statins Protocol Passed    8/23/2018  7:55 PM       Passed - LDL on file in past 12 months    Recent Labs   Lab Test  06/01/18   0837   LDL  89            Passed - No abnormal creatine kinase in past 12 months    No lab results found.            Passed - Recent (12 mo) or future (30 days) visit within the authorizing provider's specialty    Patient had office visit in the last 12 months or has a visit in the next 30 days with authorizing provider or within the authorizing provider's specialty.  See \"Patient Info\" tab in inbasket, or \"Choose Columns\" in Meds & Orders section of the refill encounter.           Passed - Patient is age 18 or older       Passed - No active pregnancy on record       Passed - No positive pregnancy test in past 12 months          "

## 2018-08-28 RX ORDER — SIMVASTATIN 10 MG
TABLET ORAL
Qty: 90 TABLET | Refills: 2 | Status: SHIPPED | OUTPATIENT
Start: 2018-08-28 | End: 2019-06-03

## 2018-09-04 ENCOUNTER — APPOINTMENT (OUTPATIENT)
Dept: VASCULAR SURGERY | Facility: CLINIC | Age: 78
End: 2018-09-04
Payer: COMMERCIAL

## 2018-09-04 ENCOUNTER — OFFICE VISIT (OUTPATIENT)
Dept: VASCULAR SURGERY | Facility: CLINIC | Age: 78
End: 2018-09-04
Payer: COMMERCIAL

## 2018-09-04 DIAGNOSIS — Z53.9 ERRONEOUS ENCOUNTER--DISREGARD: Primary | ICD-10-CM

## 2018-09-04 PROCEDURE — 99213 OFFICE O/P EST LOW 20 MIN: CPT | Performed by: SURGERY

## 2018-09-04 PROCEDURE — 93970 EXTREMITY STUDY: CPT | Performed by: SURGERY

## 2018-09-04 NOTE — MR AVS SNAPSHOT
After Visit Summary   9/4/2018    Sophia Olivarez    MRN: [id number]           Patient Information     Date Of Birth          1940        Visit Information        Provider Department      9/4/2018 4:00 PM Adams Velasquez MD [facility]      Today's Diagnoses     ERRONEOUS ENCOUNTER--DISREGARD    -  1       Follow-ups after your visit        Who to contact     If you have questions or need follow up information about today's clinic visit or your schedule please contact [facility] directly at [phone].  Normal or non-critical lab and imaging results will be communicated to you by MyChart, letter or phone within 4 business days after the clinic has received the results. If you do not hear from us within 7 days, please contact the clinic through [employer] P[employer] or phone. If you have a critical or abnormal lab result, we will notify you by phone as soon as possible.  Submit refill requests through Telderi or call your pharmacy and they will forward the refill request to us. Please allow 3 business days for your refill to be completed.          Additional Information About Your Visit        MyChart Information     Tel[employer]i gives you secure access to your electronic health record. If you see a primary care provider, you can also send messages to your care team and make appointments. If you have questions, please call your primary care clinic.  If you do not have a primary care provider, please call [phone] and they will assist you.        Care EveryWhere ID     This is your Care EveryWhere ID. This could be used by other organizations to access your [facility] medical records  [id number]         Blood Pressure from Last 3 Encounters:   10/22/18 124/66   10/09/18 141/83   09/28/18 108/66    Weight from Last 3 Encounters:   10/22/18 65 kg (143 lb 6.4 oz)   10/09/18 63.5 kg (140 lb)   09/28/18 66.3 kg (146 lb 1.6  oz)              Today, you had the following     No orders found for display       Primary Care Provider Office Phone # Fax #    Monica ERWIN MD [address] [phone]       April WILL [address] [facility] [address]        Equal Access to Services     AMRIT AIDEN : Hadii aad ku hadmaryo Soomaali, waaxda luqadaha, qaybta kaalmada adeegyada, waxhazel miladin haymoisesn adekeyon santa la'moisesedgar taylor. So [facility] [phone].    ATENCIÓN: Si habla español, tiene a sousa disposición servicios gratuitos de asistencia lingüística. Llame al [phone].    We comply with applicable federal civil rights laws and [place] laws. We do not discriminate on the basis of race, color, national origin, age, disability, sex, sexual orientation, or gender identity.            Thank you!     Thank you for choosing [facility]  for your care. Our goal is always to provide you with excellent care. Hearing back from our patients is one way we can continue to improve our services. Please take a few minutes to complete the written survey that you may receive in the mail after your visit with us. Thank you!             Your Updated Medication List - Protect others around you: Learn how to safely use, store and throw away your medicines at www.disposemymeds.org.          This list is accurate as of 9/4/18 11:59 PM.  Always use your most recent med list.                   Brand Name Dispense Instructions for use Diagnosis    aspirin 81 MG tablet      Take by mouth as needed        CVS VITAMIN B12 2000 MCG Tabs   Generic drug:  Cyanocobalamin           hydrochlorothiazide 25 MG tablet    HYDRODIURIL    90 tablet    TAKE 1 TABLET (25 MG) BY MOUTH DAILY    Essential hypertension       IBANdronate 150 MG tablet    BONIVA    3 tablet    Take 1 tablet (150 mg) by mouth every 30 days    Osteoporosis, unspecified osteoporosis type, unspecified pathological fracture presence       lisinopril 5 MG tablet    PRINIVIL/ZESTRIL    90  tablet    Take 1 tablet (5 mg) by mouth daily    Essential hypertension       loperamide 2 MG tablet    IMODIUM A-D     Take 2 mg by mouth as needed for diarrhea        MULTIVITAMIN & MINERAL PO      Take 1 tablet by mouth daily        omega-3 fatty acids 1200 MG capsule     90 capsule    Take 1 capsule by mouth daily        simvastatin 10 MG tablet    ZOCOR    90 tablet    TAKE ONE TABLET BY MOUTH EVERY NIGHT AT BEDTIME    Mixed hyperlipidemia       vitamin C CR 1000 MG Tbcr           VITAMIN D3 PO      Take 2,000 Units by mouth daily

## 2018-09-21 ENCOUNTER — TRANSFERRED RECORDS (OUTPATIENT)
Dept: HEALTH INFORMATION MANAGEMENT | Facility: CLINIC | Age: 78
End: 2018-09-21

## 2018-09-28 ENCOUNTER — OFFICE VISIT (OUTPATIENT)
Dept: INTERNAL MEDICINE | Facility: CLINIC | Age: 78
End: 2018-09-28
Payer: COMMERCIAL

## 2018-09-28 VITALS
BODY MASS INDEX: 27.61 KG/M2 | HEART RATE: 104 BPM | WEIGHT: 146.1 LBS | RESPIRATION RATE: 18 BRPM | TEMPERATURE: 98.2 F | OXYGEN SATURATION: 97 % | DIASTOLIC BLOOD PRESSURE: 66 MMHG | SYSTOLIC BLOOD PRESSURE: 108 MMHG

## 2018-09-28 DIAGNOSIS — R05.9 COUGH: Primary | ICD-10-CM

## 2018-09-28 PROCEDURE — 99214 OFFICE O/P EST MOD 30 MIN: CPT | Performed by: INTERNAL MEDICINE

## 2018-09-28 RX ORDER — BENZONATATE 100 MG/1
100 CAPSULE ORAL 3 TIMES DAILY PRN
Qty: 30 CAPSULE | Refills: 0 | Status: SHIPPED | OUTPATIENT
Start: 2018-09-28 | End: 2018-10-22

## 2018-09-28 RX ORDER — LIFITEGRAST 50 MG/ML
SOLUTION/ DROPS OPHTHALMIC
Refills: 12 | COMMUNITY
Start: 2018-09-11 | End: 2021-06-08

## 2018-09-28 NOTE — MR AVS SNAPSHOT
After Visit Summary   9/28/2018    Sophia Olivarez    MRN: [id number]           Patient Information     Date Of Birth          1940        Visit Information        Provider Department      9/28/2018 2:40 PM Monica Sultana MD [facility]        Today's Diagnoses     Cough    -  1       Follow-ups after your visit        Who to contact     If you have questions or need follow up information about today's clinic visit or your schedule please contact [facility] directly at [phone].  Normal or non-critical lab and imaging results will be communicated to you by [employer] Sp[employer] Therapeuticshart, letter or phone within 4 business days after the clinic has received the results. If you do not hear from us within 7 days, please contact the clinic through [employer]oPathway[employer] or phone. If you have a critical or abnormal lab result, we will notify you by phone as soon as possible.  Submit refill requests through Wizeline or call your pharmacy and they will forward the refill request to us. Please allow 3 business days for your refill to be completed.          Additional Information About Your Visit        MyChart Information     Wizeline gives you secure access to your electronic health record. If you see a primary care provider, you can also send messages to your care team and make appointments. If you have questions, please call your primary care clinic.  If you do not have a primary care provider, please call [phone] and they will assist you.        Care EveryWhere ID     This is your Care EveryWhere ID. This could be used by other organizations to access your [facility] medical records  [id number]        Your Vitals Were     Pulse Temperature Respirations Pulse Oximetry BMI (Body Mass Index)       104 98.2  F (36.8  C) (Oral) 18 97% 27.61 kg/m2        Blood Pressure from Last 3 Encounters:   09/28/18 108/66   06/01/18 118/70   02/26/18 108/68    Weight from Last 3 Encounters:   09/28/18  146 lb 1.6 oz (66.3 kg)   06/01/18 142 lb 12.8 oz (64.8 kg)   02/26/18 142 lb 1.6 oz (64.5 kg)              Today, you had the following     No orders found for display         Today's Medication Changes          These changes are accurate as of 9/28/18 11:59 PM.  If you have any questions, ask your nurse or doctor.               Start taking these medicines.        Dose/Directions    benzonatate 100 MG capsule   Commonly known as:  TESSALON   Used for:  Cough   Started by:  Monica Sultana MD        Dose:  100 mg   Take 1 capsule (100 mg) by mouth 3 times daily as needed for cough   Quantity:  30 capsule   Refills:  0            Where to get your medicines      These medications were sent to [address]  [address] [address]     Phone:  [phone]     benzonatate 100 MG capsule                Primary Care Provider Office Phone # Fax #    Monica Sultana [phone]       [facility] [address] [facility] [address]        Equal Access to Services     [facility]: Hadii aad ku hadasho Soomaali, waaxda luqadaha, qaybta kaalmada adeegyada, jeffery wilder hayleonela garcia . So [facility] [phone].    ATENCIÓN: Si habla español, tiene a sousa disposición servicios gratuitos de asistencia lingüística. Llame al [phone].    We comply with applicable federal civil rights laws and [place] laws. We do not discriminate on the basis of race, color, national origin, age, disability, sex, sexual orientation, or gender identity.            Thank you!     Thank you for choosing [facility]  for your care. Our goal is always to provide you with excellent care. Hearing back from our patients is one way we can continue to improve our services. Please take a few minutes to complete the written survey that you may receive in the mail after your visit with us. Thank you!             Your Updated Medication List -  Protect others around you: Learn how to safely use, store and throw away your medicines at www.disposemymeds.org.          This list is accurate as of 9/28/18 11:59 PM.  Always use your most recent med list.                   Brand Name Dispense Instructions for use Diagnosis    aspirin 81 MG tablet      Take by mouth as needed        benzonatate 100 MG capsule    TESSALON    30 capsule    Take 1 capsule (100 mg) by mouth 3 times daily as needed for cough    Cough       CVS VITAMIN B12 2000 MCG Tabs   Generic drug:  Cyanocobalamin           hydrochlorothiazide 25 MG tablet    HYDRODIURIL    90 tablet    TAKE 1 TABLET (25 MG) BY MOUTH DAILY    Essential hypertension       IBANdronate 150 MG tablet    BONIVA    3 tablet    Take 1 tablet (150 mg) by mouth every 30 days    Osteoporosis, unspecified osteoporosis type, unspecified pathological fracture presence       lisinopril 5 MG tablet    PRINIVIL/ZESTRIL    90 tablet    Take 1 tablet (5 mg) by mouth daily    Essential hypertension       loperamide 2 MG tablet    IMODIUM A-D     Take 2 mg by mouth as needed for diarrhea        MULTIVITAMIN & MINERAL PO      Take 1 tablet by mouth daily        omega-3 fatty acids 1200 MG capsule     90 capsule    Take 1 capsule by mouth daily        simvastatin 10 MG tablet    ZOCOR    90 tablet    TAKE ONE TABLET BY MOUTH EVERY NIGHT AT BEDTIME    Mixed hyperlipidemia       vitamin C CR 1000 MG Tbcr           VITAMIN D3 PO      Take 2,000 Units by mouth daily        XIIDRA 5 % opthalmic solution   Generic drug:  lifitegrast      INSTILL 1 DROP INTO EACH EYE EVERY 12 HOURS

## 2018-09-28 NOTE — NURSING NOTE
/66  Pulse 104  Temp 98.2  F (36.8  C) (Oral)  Resp 18  Wt 146 lb 1.6 oz (66.3 kg)  SpO2 97%  BMI 27.61 kg/m2   Patient in for cough x's 10 days.  Dinorah Luis, CMA

## 2018-09-28 NOTE — PROGRESS NOTES
SUBJECTIVE:   Sophia Olivarez is a 77 year old female who presents to clinic today for the following health issues:    Pt is a 77 year old female who is seen here to day with c/o cough of 10 days duration,mainly dry cough, no fever..has post nasal drip, .no h/o asthma , no wheezing, no SOB. Initially started with sore throat 10 days ago and runny nose, which has resolved, pt was seen at [facility] clinic 1 wk ago ,and was advised to use OTC cough syrup and and 24 hr allergy medication..       Past Medical History:   Diagnosis Date     Benign neoplasm of skin, site unspecified     sees derm.     Calculus of kidney     3 passed spont.     Chronic subinvolution of uterus      Disorder of bone and cartilage, unspecified     osteopenia     Dyspepsia and other specified disorders of function of stomach     dyspepsia     Generalized osteoarthrosis, unspecified site      H/O thyroid nodule     f/u with endocrinologist     Hematuria     microscopic     HTN (hypertension)      Malignant neoplasm of corpus uteri, except isthmus (H)     surgery and radiation rx.     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      Nontoxic multinodular goiter     sees endocrine     Other and unspecified hyperlipidemia        Current Outpatient Prescriptions   Medication Sig Dispense Refill     Ascorbic Acid (VITAMIN C CR) 1000 MG TBCR        aspirin 81 MG tablet Take by mouth as needed        benzonatate (TESSALON) 100 MG capsule Take 1 capsule (100 mg) by mouth 3 times daily as needed for cough 30 capsule 0     Cholecalciferol (VITAMIN D3 PO) Take 2,000 Units by mouth daily       Cyanocobalamin (CVS VITAMIN B12) 2000 MCG TABS        hydrochlorothiazide (HYDRODIURIL) 25 MG tablet TAKE 1 TABLET (25 MG) BY MOUTH DAILY 90 tablet 3     IBANdronate (BONIVA) 150 MG tablet Take 1  tablet (150 mg) by mouth every 30 days 3 tablet 3     lisinopril (PRINIVIL/ZESTRIL) 5 MG tablet Take 1 tablet (5 mg) by mouth daily 90 tablet 3     loperamide (IMODIUM A-D) 2 MG tablet Take 2 mg by mouth as needed for diarrhea       Multiple Vitamins-Minerals (MULTIVITAMIN & MINERAL PO) Take 1 tablet by mouth daily       omega-3 fatty acids 1200 MG capsule Take 1 capsule by mouth daily 90 capsule      simvastatin (ZOCOR) 10 MG tablet TAKE ONE TABLET BY MOUTH EVERY NIGHT AT BEDTIME 90 tablet 2     XIIDRA 5 % opthalmic solution INSTILL 1 DROP INTO EACH EYE EVERY 12 HOURS  12       ROS:  General:no fever  ENT; no sore throat , has PND   RESP:dry cough  CVS;negative      Blood pressure 108/66, pulse 104, temperature 98.2  F (36.8  C), temperature source Oral, resp. rate 18, weight 146 lb 1.6 oz (66.3 kg), SpO2 97 %, not currently breastfeeding.  GENERAL:healthy, alert, no distress  HEENT-pupils equal and reactive to light and accommodation, oropharynx clear,TM's clear.  NECK: NEGATIVE  RESP:no wheezing or crackles.  CV: regular rate and rhythm     MS:no peripheral edema, no calf tenderness      ASSESSMENT AND PLAN:     (R05) Cough  (primary encounter diagnosis)  Plan:explained about the condition, started on  benzonatate (TESSALON) 100 MG capsule as directed.explained clearly about the medication,insructions and side effects. Advised to continue OTC allergy med.Call or return to clinic prn if these symtoms worsen, fail to improve as anticipated, or if new symptoms develop. Discussed prednisone if cough persists but pt is not interested in steroids.

## 2018-10-09 ENCOUNTER — HOSPITAL ENCOUNTER (EMERGENCY)
Facility: CLINIC | Age: 78
Discharge: HOME | End: 2018-10-09
Attending: EMERGENCY MEDICINE | Admitting: EMERGENCY MEDICINE
Payer: COMMERCIAL

## 2018-10-09 VITALS
BODY MASS INDEX: 26.45 KG/M2 | WEIGHT: 140 LBS | SYSTOLIC BLOOD PRESSURE: 141 MMHG | OXYGEN SATURATION: 95 % | RESPIRATION RATE: 18 BRPM | DIASTOLIC BLOOD PRESSURE: 83 MMHG | TEMPERATURE: 97.7 F

## 2018-10-09 DIAGNOSIS — R04.0 EPISTAXIS: ICD-10-CM

## 2018-10-09 PROCEDURE — 99284 EMERGENCY DEPT VISIT MOD MDM: CPT

## 2018-10-09 PROCEDURE — 30901 CONTROL OF NOSEBLEED: CPT

## 2018-10-09 RX ORDER — LIDOCAINE 40 MG/G
CREAM TOPICAL
Status: DISCONTINUED
Start: 2018-10-09 | End: 2018-10-09 | Stop reason: HOSPADM

## 2018-10-09 RX ORDER — OXYMETAZOLINE HYDROCHLORIDE 0.05 G/100ML
SPRAY NASAL
Status: DISCONTINUED
Start: 2018-10-09 | End: 2018-10-09 | Stop reason: HOSPADM

## 2018-10-09 ASSESSMENT — ENCOUNTER SYMPTOMS
COUGH: 1
BLOOD IN STOOL: 0
HEMATURIA: 0

## 2018-10-09 NOTE — ED TRIAGE NOTES
Alert and oriented x 3 airway,breathing and circulation intact, nose bleed since midnight, clamp applied in triage bleeding controlled

## 2018-10-09 NOTE — ED NOTES
Pt ambulating in the halls. MD wanted pt to try and walk around to see her nose would bleed again. Pt's nose appears to have stopped bleeding. Pt will discharge home now. Pt accompanied by her .

## 2018-10-09 NOTE — ED NOTES
Pt ready for discharge. All medications and instructions reviewed with the patient and the pt's spouse. Pt's nose began to bleed again slightly as pt was leaving the unit. The pt was escorted back into her room. MD notified. Nose clamp applied.

## 2018-10-09 NOTE — ED PROVIDER NOTES
History     Chief Complaint:  Epistaxis    HPI   Sophia Olivarez is a 77 year old female who presents with her  to the ED for evaluation of epistaxis. The patient reports she developed left nare epistaxis near midnight today. She attempted to hold her nostrils together wither her head down and placed ice cubes on top of her nose without cessation of bleeding. After 15 minutes, both nostrils seemed to be bleeding. The patient notes she did have upper respiratory symptoms 2 weeks ago and still has a lingering intermittent cough and congestion. She does have to blow her nose everyday due to congestion. The patient reports she stopped taking her baby Aspirin approximately 3 weeks ago. The patient denies any gum bleeding, blood in stool, hematuria, or other bleeding. She denies history of problems with epistaxis.       Allergies:  Aspirin  Atorvastatin: myalgia   Codeine: shortness of breath    Epidural: diarrhea & vomiting   Magnesium: diarrhea & vomiting  Meperidine  Questran: nausea      Medications:    Vitamin C  Vitamin D3  Vitamin B12  Hydrochlorothiazide   Boniva   Fish oil  Multivitamin-mineral   Lisinopril  Imodium   Simvastatin      Past Medical History:    Skin neoplasm   Kidney calculus   Chronic uterus subinvolution   Bone & cartilage disorder  Osteopenia   Dyspepsia  Generalized osteoarthrosis  Thyroid nodule  HTN  Corps uteri neoplasm   Multinodular goiter  HLD  Uterine cancer     Past Surgical History:    Hysterectomy  Thyroid nodule biopsies  Cholecystectomy  Right thyroidectomy  D & C x2    Family History:    Cancer  Osteoporosis  MS  Diabetes  Colon cancer  HTN  HLD  Anxiety  Bipolar disorder  Schizophrenia     Social History:  Smoking status: Never smoker   Alcohol use: No  Presents to ED with     Marital Status:   [2]     Review of Systems   HENT: Positive for congestion and nosebleeds.    Respiratory: Positive for cough.    Gastrointestinal: Negative for blood in stool.    Genitourinary: Negative for hematuria.   All other systems reviewed and are negative.    Physical Exam     Patient Vitals for the past 24 hrs:   BP Temp Temp src Heart Rate Resp SpO2 Weight   10/09/18 0456 - - - - - 95 % -   10/09/18 0454 141/83 - - - - - -   10/09/18 0422 - - - - - 95 % -   10/09/18 0418 140/75 - - - - 94 % -   10/09/18 0343 167/89 - - - - - -   10/09/18 0340 - 97.7  F (36.5  C) Temporal 83 18 99 % 63.5 kg (140 lb)     Physical Exam  Gen: alert, answers all questions appropriately.   HEENT: PERRL. No tonsillar exudate and erythema. No trismus, uvula midline, oropharynx symmetric.   Nose: area of irritation and bleeding in left nare/septum.   Neck: full AROM, no midline tracheal tenderness   Lymph: No anterior cervical adenopathy  CV: RRR, no murmurs   Pulm: breath sounds equal, lungs clear  Skin: facial skin normal  Neuro: CN 5 and 7 normal    Emergency Department Course     Procedures:  Epistaxis treatment  Indication: persistent epistaxis despite an attempt at control with direct pressure  Performed by: Ursula Nye MD  Consent: verbal consent given by patient  Location: Left nare  Procedure: the nare was prepped with afrin.  The area of ongoing oozing was identified by direct visualization.  Silver nitrate was used to cauterize the area.  Hemostasis was achieved.  No complications.    Emergency Department Course:  Past medical records, nursing notes, and vitals reviewed.  0352: I performed an exam of the patient and obtained history, as documented above.    0430: I rechecked the patient.     0441: I performed the silver nitrate cautery as noted above.    0511: Patient's left nare is bleeding again.     0517: I rechecked the patient. Pressure was applied to patient's nose again, and she was observed for 20 minutes.     0550: I rechecked the patient.     Findings and plan explained to the Patient and spouse. Patient discharged home with instructions regarding supportive care,  medications, and reasons to return. The importance of close follow-up was reviewed.     Impression & Plan      Medical Decision Making:  Sophia Olivarez is a 77 year old female who presents for evaluation of nasal bleeding and epistaxis.  The bleeding was controlled with interventions in the ED and therefore supportive outpatient management is indicated.  Close follow-up with ENT and primary care; see discharge instructions. There are no signs of coagulopathy causing the bleeding or a general medical condition (thrombocytopenia, DIC, leukemia, etc) causing the bleeding today.    Silver nitrate was used to cauterize the bleeding source, see above procedure note.  The bleeding stopped and did not restart here in ED, therefore no nasal packing is indicated.  Discussed with patient to use humidity and vaseline or bacitracin in nares bid for the next week.      Diagnosis:    ICD-10-CM   1. Epistaxis R04.0     Disposition: Patient discharged to home with      Norma Kelley  10/9/2018   [facility] EMERGENCY DEPARTMENT    Scribe Disclosure:  I, Norma Allie, am serving as a scribe at 3:52 AM on 10/9/2018 to document services personally performed by Ursula Nye MD based on my observations and the provider's statements to me.        Ursula Nye MD  10/09/18 0656

## 2018-10-09 NOTE — ED AVS SNAPSHOT
[facility] Emergency Department    [address]    [facility] [address]    Phone:  [phone]    Fax:  [phone]                                       Sophia Olivarez   MRN: [id number]    Department:  [facility] Emergency Department   Date of Visit:  10/9/2018           After Visit Summary Signature Page     I have received my discharge instructions, and my questions have been answered. I have discussed any challenges I see with this plan with the nurse or doctor.    ..........................................................................................................................................  Patient/Patient Representative Signature      ..........................................................................................................................................  Patient Representative Print Name and Relationship to Patient    ..................................................               ................................................  Date                                   Time    ..........................................................................................................................................  Reviewed by Signature/Title    ...................................................              ..............................................  Date                                               Time          22EPIC Rev 08/18

## 2018-10-09 NOTE — ED AVS SNAPSHOT
" [facility] Emergency Department    [address]    [facility] [address]    Phone:  [phone]    Fax:  [phone]                                       Sophia Olivarez   MRN: [id number]    Department:  [facility] Emergency Department   Date of Visit:  10/9/2018           Patient Information     Date Of Birth          1940        Your diagnoses for this visit were:     Epistaxis        You were seen by Ursula Nye MD.      Follow-up Information     Follow up with [facility] Emergency Department.    Specialty:  EMERGENCY MEDICINE    Why:  If symptoms worsen    Contact information:    [address]  [facility] [address] [phone]        Discharge Instructions       Discharge Instructions  Epistaxis    Today you were seen for a nosebleed.   Nosebleeds (the medical term is \"epistaxis\") are very common. Almost every person has had at least one in their lifetime.  Although the amount of blood loss can appear dramatic, nosebleeds rarely cause serious problems.  The most common causes are dry air or nose picking, but they also are common in people who have allergies, high blood pressure, or are on blood thinners (such as Coumadin, Aspirin or Plavix). If you or your child gets a nosebleed, the important thing is to know how to take care of it. With the right self-care, most nosebleeds will stop on their own.    Generally, every Emergency Department visit should have a follow-up clinic visit with either a primary or a specialty clinic/provider. Please follow-up as instructed by your emergency provider today.    Return to the Emergency Department if:    Your nose is bleeding a very large amount of blood and you are unable to stop it.    You get very pale, faint, or tired.    You cannot get the bleeding to stop after following these instructions.    Treatment:    Your provider may tell you to use a decongestant nose spray, " like Afrin  (oxymetazoline), in both nostrils in the morning and at night for the next three days. Do not use this medicine for more than three days at a time.  If you do, it will cause nasal congestion.     Use a moisturizer. A small amount of Vaseline  to the inside of your nostrils for moisture before bed is one option. There are nasal sprays available over-the-counter for this purpose as well.  Using a humidifier in your bedroom or home will help as well when the air is dry.    For the next three days, do not blow your nose or put anything in your nose. You may sniffle, or dab the outside of your nose.    Do not bend with your head below your waist for the next three days. Do not lift anything so heavy that you have to strain.     If you received nasal packing, please do not remove the packing until seen by an Ear, Nose, and Throat (ENT) specialist.  If antibiotics have been given with the packing, please take as directed.    If your nose starts to bleed again:    Blow your nose to get rid of some of the clots that have formed inside your nostrils. This may increase the bleeding temporarily, but that is okay.    Spray decongestant nose spray (like Afrin ) into both nostrils to constrict the blood vessels.    Sit or stand while bending forward slightly at the waist. Do not lie down or tilt your head back. This may cause you to swallow blood and can lead to vomiting.     the soft part of BOTH nostrils at the bottom of your nose and squeeze your nose closed for at least 5 minutes (for children) or 10 to 15 minutes (for adults). Use a clock to time yourself. Do not release the pressure every so often to check whether the bleeding has stopped. Many people hurt their chances of stopping the bleeding by releasing the pressure too soon or too often.    If you follow the steps outlined above, and your nose continues to bleed, repeat all the steps once more. Apply pressure for a total of at least 30 minutes. If you  continue to bleed even then, seek medical attention.  If you were given a prescription for medicine here today, be sure to read all of the information (including the package insert) that comes with your prescription.  This will include important information about the medicine, its side effects, and any warnings that you need to know about.  The pharmacist who fills the prescription can provide more information and answer questions you may have about the medicine.  If you have questions or concerns that the pharmacist cannot address, please call or return to the Emergency Department.   Remember that you can always come back to the Emergency Department if you are not able to see your regular provider in the amount of time listed above, if you get any new symptoms, or if there is anything that worries you.      24 Hour Appointment Hotline       To make an appointment at any [facility], call [phone] ([phone]). If you don't have a family doctor or clinic, we will help you find one. [place] clinics are conveniently located to serve the needs of you and your family.             Review of your medicines      Our records show that you are taking the medicines listed below. If these are incorrect, please call your family doctor or clinic.        Dose / Directions Last dose taken    aspirin 81 MG tablet        Take by mouth as needed   Refills:  0        benzonatate 100 MG capsule   Commonly known as:  TESSALON   Dose:  100 mg   Quantity:  30 capsule        Take 1 capsule (100 mg) by mouth 3 times daily as needed for cough   Refills:  0        CVS VITAMIN B12 2000 MCG Tabs   Generic drug:  Cyanocobalamin        Refills:  0        hydrochlorothiazide 25 MG tablet   Commonly known as:  HYDRODIURIL   Quantity:  90 tablet        TAKE 1 TABLET (25 MG) BY MOUTH DAILY   Refills:  3        IBANdronate 150 MG tablet   Commonly known as:  BONIVA   Dose:  150 mg   Quantity:  3 tablet        Take 1 tablet (150 mg) by  mouth every 30 days   Refills:  3        lisinopril 5 MG tablet   Commonly known as:  PRINIVIL/ZESTRIL   Dose:  5 mg   Quantity:  90 tablet        Take 1 tablet (5 mg) by mouth daily   Refills:  3        loperamide 2 MG tablet   Commonly known as:  IMODIUM A-D   Dose:  2 mg        Take 2 mg by mouth as needed for diarrhea   Refills:  0        MULTIVITAMIN & MINERAL PO   Dose:  1 tablet        Take 1 tablet by mouth daily   Refills:  0        omega-3 fatty acids 1200 MG capsule   Dose:  1 capsule   Quantity:  90 capsule        Take 1 capsule by mouth daily   Refills:  0        simvastatin 10 MG tablet   Commonly known as:  ZOCOR   Quantity:  90 tablet        TAKE ONE TABLET BY MOUTH EVERY NIGHT AT BEDTIME   Refills:  2        vitamin C CR 1000 MG Tbcr        Refills:  0        VITAMIN D3 PO   Dose:  2000 Units        Take 2,000 Units by mouth daily   Refills:  0        XIIDRA 5 % opthalmic solution   Generic drug:  lifitegrast        INSTILL 1 DROP INTO EACH EYE EVERY 12 HOURS   Refills:  12                Orders Needing Specimen Collection     None      Pending Results     No orders found from 10/7/2018 to 10/10/2018.            Pending Culture Results     No orders found from 10/7/2018 to 10/10/2018.            Pending Results Instructions     If you had any lab results that were not finalized at the time of your Discharge, you can call the ED Lab Result RN at [phone]. You will be contacted by this team for any positive Lab results or changes in treatment. The nurses are available 7 days a week from 10A to 6:30P.  You can leave a message 24 hours per day and they will return your call.        Test Results From Your Hospital Stay               Clinical Quality Measure: Blood Pressure Screening     Your blood pressure was checked while you were in the emergency department today. The last reading we obtained was  BP: 141/83 . Please read the guidelines below about what these numbers mean and what you should do  about them.  If your systolic blood pressure (the top number) is less than 120 and your diastolic blood pressure (the bottom number) is less than 80, then your blood pressure is normal. There is nothing more that you need to do about it.  If your systolic blood pressure (the top number) is 120-139 or your diastolic blood pressure (the bottom number) is 80-89, your blood pressure may be higher than it should be. You should have your blood pressure rechecked within a year by a primary care provider.  If your systolic blood pressure (the top number) is 140 or greater or your diastolic blood pressure (the bottom number) is 90 or greater, you may have high blood pressure. High blood pressure is treatable, but if left untreated over time it can put you at risk for heart attack, stroke, or kidney failure. You should have your blood pressure rechecked by a primary care provider within the next 4 weeks.  If your provider in the emergency department today gave you specific instructions to follow-up with your doctor or provider even sooner than that, you should follow that instruction and not wait for up to 4 weeks for your follow-up visit.        Thank you for choosing [facility]       Thank you for choosing [facility] for your care. Our goal is always to provide you with excellent care. Hearing back from our patients is one way we can continue to improve our services. Please take a few minutes to complete the written survey that you may receive in the mail after you visit with us. Thank you!        [facility] Information     [employer] gives you secure access to your electronic health record. If you see a primary care provider, you can also send messages to your care team and make appointments. If you have questions, please call your primary care clinic.  If you do not have a primary care provider, please call [phone] and they will assist you.        Care EveryWhere ID     This is your Care EveryWhere ID. This could be used by  other organizations to access your [place] medical records  [id number]        Equal Access to Services     RAE WOLFE : Edvin Li, lazaro puga, jeffery donaldson. So [facility] [phone].    ATENCIÓN: Si habla español, tiene a sousa disposición servicios gratuitos de asistencia lingüística. Llame al [phone].    We comply with applicable federal civil rights laws and [place] laws. We do not discriminate on the basis of race, color, national origin, age, disability, sex, sexual orientation, or gender identity.            After Visit Summary       This is your record. Keep this with you and show to your community pharmacist(s) and doctor(s) at your next visit.

## 2018-10-22 ENCOUNTER — OFFICE VISIT (OUTPATIENT)
Dept: INTERNAL MEDICINE | Facility: CLINIC | Age: 78
End: 2018-10-22
Payer: COMMERCIAL

## 2018-10-22 VITALS
WEIGHT: 143.4 LBS | BODY MASS INDEX: 27.1 KG/M2 | OXYGEN SATURATION: 98 % | HEART RATE: 99 BPM | SYSTOLIC BLOOD PRESSURE: 124 MMHG | DIASTOLIC BLOOD PRESSURE: 66 MMHG | RESPIRATION RATE: 16 BRPM | TEMPERATURE: 97.3 F

## 2018-10-22 DIAGNOSIS — Z23 NEED FOR SHINGLES VACCINE: ICD-10-CM

## 2018-10-22 DIAGNOSIS — J34.89 STUFFY AND RUNNY NOSE: ICD-10-CM

## 2018-10-22 DIAGNOSIS — M79.604 LEG PAIN, BILATERAL: Primary | ICD-10-CM

## 2018-10-22 DIAGNOSIS — R04.0 EPISTAXIS: ICD-10-CM

## 2018-10-22 DIAGNOSIS — M79.605 LEG PAIN, BILATERAL: Primary | ICD-10-CM

## 2018-10-22 LAB — ERYTHROCYTE [SEDIMENTATION RATE] IN BLOOD BY WESTERGREN METHOD: 28 MM/H (ref 0–30)

## 2018-10-22 PROCEDURE — 90750 HZV VACC RECOMBINANT IM: CPT | Performed by: INTERNAL MEDICINE

## 2018-10-22 PROCEDURE — 82550 ASSAY OF CK (CPK): CPT | Performed by: INTERNAL MEDICINE

## 2018-10-22 PROCEDURE — 90471 IMMUNIZATION ADMIN: CPT | Performed by: INTERNAL MEDICINE

## 2018-10-22 PROCEDURE — 36415 COLL VENOUS BLD VENIPUNCTURE: CPT | Performed by: INTERNAL MEDICINE

## 2018-10-22 PROCEDURE — 85652 RBC SED RATE AUTOMATED: CPT | Performed by: INTERNAL MEDICINE

## 2018-10-22 PROCEDURE — 99214 OFFICE O/P EST MOD 30 MIN: CPT | Mod: 25 | Performed by: INTERNAL MEDICINE

## 2018-10-22 RX ORDER — CETIRIZINE HYDROCHLORIDE 10 MG/1
10 TABLET ORAL EVERY EVENING
Qty: 90 TABLET | Refills: 1 | Status: SHIPPED | OUTPATIENT
Start: 2018-10-22 | End: 2020-07-17

## 2018-10-22 NOTE — NURSING NOTE
/66  Pulse 99  Temp 97.3  F (36.3  C) (Oral)  Resp 16  Wt 143 lb 6.4 oz (65 kg)  SpO2 98%  BMI 27.1 kg/m2  Patient in for ER follow up.  Dinorah Luis CMA

## 2018-10-22 NOTE — MR AVS SNAPSHOT
After Visit Summary   10/22/2018    Sophia Olivarez    MRN: [id number]           Patient Information     Date Of Birth          1940        Visit Information        Provider Department      10/22/2018 11:00 AM Monica Sultana MD [facility]        Today's Diagnoses     Leg pain, bilateral    -  1    Stuffy and runny nose        Epistaxis        Need for shingles vaccine           Follow-ups after your visit        Who to contact     If you have questions or need follow up information about today's clinic visit or your schedule please contact [facility] directly at [phone].  Normal or non-critical lab and imaging results will be communicated to you by MyChart, letter or phone within 4 business days after the clinic has received the results. If you do not hear from us within 7 days, please contact the clinic through [facility] or phone. If you have a critical or abnormal lab result, we will notify you by phone as soon as possible.  Submit refill requests through [employer] or call your pharmacy and they will forward the refill request to us. Please allow 3 business days for your refill to be completed.          Additional Information About Your Visit        MyChart Information     [employer] gives you secure access to your electronic health record. If you see a primary care provider, you can also send messages to your care team and make appointments. If you have questions, please call your primary care clinic.  If you do not have a primary care provider, please call [phone] and they will assist you.        Care EveryWhere ID     This is your Care EveryWhere ID. This could be used by other organizations to access your [place] medical records  [id number]        Your Vitals Were     Pulse Temperature Respirations Pulse Oximetry BMI (Body Mass Index)       99 97.3  F (36.3  C) (Oral) 16 98% 27.1 kg/m2        Blood Pressure from Last 3 Encounters:    10/22/18 124/66   10/09/18 141/83   09/28/18 108/66    Weight from Last 3 Encounters:   10/22/18 143 lb 6.4 oz (65 kg)   10/09/18 140 lb (63.5 kg)   09/28/18 146 lb 1.6 oz (66.3 kg)              We Performed the Following     CK total     ESR: Erythrocyte sedimentation rate     ZOSTER VACCINE RECOMBINANT ADJUVANTED IM NJX          Today's Medication Changes          These changes are accurate as of 10/22/18  1:28 PM.  If you have any questions, ask your nurse or doctor.               Start taking these medicines.        Dose/Directions    cetirizine 10 MG tablet   Commonly known as:  zyrTEC   Used for:  Stuffy and runny nose   Started by:  Monica Sultana MD        Dose:  10 mg   Take 1 tablet (10 mg) by mouth every evening   Quantity:  90 tablet   Refills:  1         Stop taking these medicines if you haven't already. Please contact your care team if you have questions.     benzonatate 100 MG capsule   Commonly known as:  TESSALON   Stopped by:  Monica Sultana MD                Where to get your medicines      These medications were sent to [facility] [address]  [address] [place] [address]     Phone:  [phone]     cetirizine 10 MG tablet                Primary Care Provider Office Phone # Fax #    Monica Sultana [phone]       [address] [facility] [address]        Equal Access to Services     AMRIT WOLFE AH: Hadii aad ku hadasho Soomaali, waaxda luqadaha, qaybta kaalmada adeegyada, waxay idiin hayleonela garcia . So [facility] [phone].    ATENCIÓN: Si habla español, tiene a sousa disposición servicios gratuitos de asistencia lingüística. Llame al [phone].    We comply with applicable federal civil rights laws and [place] laws. We do not discriminate on the basis of race, color, national origin, age, disability, sex, sexual orientation, or gender identity.            Thank you!     Thank  you for choosing [facility]  for your care. Our goal is always to provide you with excellent care. Hearing back from our patients is one way we can continue to improve our services. Please take a few minutes to complete the written survey that you may receive in the mail after your visit with us. Thank you!             Your Updated Medication List - Protect others around you: Learn how to safely use, store and throw away your medicines at www.disposemymeds.org.          This list is accurate as of 10/22/18  1:28 PM.  Always use your most recent med list.                   Brand Name Dispense Instructions for use Diagnosis    aspirin 81 MG tablet      Take by mouth as needed        cetirizine 10 MG tablet    zyrTEC    90 tablet    Take 1 tablet (10 mg) by mouth every evening    Stuffy and runny nose       CVS VITAMIN B12 2000 MCG Tabs   Generic drug:  Cyanocobalamin           hydrochlorothiazide 25 MG tablet    HYDRODIURIL    90 tablet    TAKE 1 TABLET (25 MG) BY MOUTH DAILY    Essential hypertension       IBANdronate 150 MG tablet    BONIVA    3 tablet    Take 1 tablet (150 mg) by mouth every 30 days    Osteoporosis, unspecified osteoporosis type, unspecified pathological fracture presence       lisinopril 5 MG tablet    PRINIVIL/ZESTRIL    90 tablet    Take 1 tablet (5 mg) by mouth daily    Essential hypertension       loperamide 2 MG tablet    IMODIUM A-D     Take 2 mg by mouth as needed for diarrhea        MULTIVITAMIN & MINERAL PO      Take 1 tablet by mouth daily        omega-3 fatty acids 1200 MG capsule     90 capsule    Take 1 capsule by mouth daily        simvastatin 10 MG tablet    ZOCOR    90 tablet    TAKE ONE TABLET BY MOUTH EVERY NIGHT AT BEDTIME    Mixed hyperlipidemia       SUDAFED PO      Take 240 mg by mouth every 8 hours as needed for congestion        vitamin C CR 1000 MG Tbcr           VITAMIN D3 PO      Take 2,000 Units by mouth daily        XIIDRA 5 % opthalmic solution    Generic drug:  lifitegrast      INSTILL 1 DROP INTO EACH EYE EVERY 12 HOURS

## 2018-10-22 NOTE — PROGRESS NOTES
SUBJECTIVE:   Sophia Olivarez is a 78 year old female who presents to clinic today for the following health issues:      ED/UC Followup:    Facility:  [facility]  Date of visit: 10/9/2018  Reason for visit: Epistaxis  Current Status: resolved        Pt is a 78 year old female who is seen here to day to follow-up on the ER visit.  Patient had epistaxis on 10/9/2018 and was seen in ER and the blood vessel was Cauterized.  Patient has not had any epistaxis since.     Pt does complain of nasal congestion, runny nose and postnasal drainage.  Was taking Zyrtec in the past but currently taking Sudafed. No SOB or wheezing.     Patient also complains of bilateral thigh pain which has been going on for about 3 weeks.  Pain mainly located in bilateral anterior thighs and also feels weak in the thighs.  Patient was on simvastatin but stopped taking simvastatin about 1 week ago to see if this will help her muscle aches in the thigh.but still continue to have pain , has not noticed any pain in her joints or any other muscles.      Patient Active Problem List   Diagnosis     Dyspepsia and other specified disorders of function of stomach     Hyperlipidemia     Calculus of kidney     Disorder of bone and cartilage     Nontoxic multinodular goiter     Generalized osteoarthrosis, unspecified site     Benign neoplasm of skin     HYPERLIPIDEMIA LDL GOAL <130     Advanced directives, counseling/discussion     Nocturnal muscle cramps     Uterine cancer (H)     NONSPECIFIC MEDICAL HISTORY     Essential hypertension     Multiple thyroid nodules     Irritable bowel syndrome with diarrhea     Osteoporosis     Gastroesophageal reflux disease, esophagitis presence not specified     Gastritis, presence of bleeding unspecified, unspecified chronicity, unspecified gastritis type     Past Surgical History:   Procedure Laterality Date     ABDOMEN SURGERY  2005    uterine cancer Hysterectiomy     BIOPSY      many times from nodules from thyroid     C  NONSPECIFIC PROCEDURE  1992    Cholecystectomy. abstracted 6/24/02.     C NONSPECIFIC PROCEDURE      Thyroidectomy. abstracted 6/24/02.     C NONSPECIFIC PROCEDURE      Dilatation & Curettage X 2. abstracted 6/24/02.     C NONSPECIFIC PROCEDURE      Cystoscopy. abstracted 6/24/02.     C NONSPECIFIC PROCEDURE      hysterctomy for uterine cancer.     CHOLECYSTECTOMY  1992     GYN SURGERY  2005    uterine cancer     HC COLONOSCOPY THRU STOMA, DIAGNOSTIC  10/2007    due q 5 yrs     HEAD & NECK SURGERY      right thyroid taken out       Social History   Substance Use Topics     Smoking status: Never Smoker     Smokeless tobacco: Never Used     Alcohol use No     Family History   Problem Relation Age of Onset     Family History Negative Mother      Other Cancer Mother      Osteoporosis Mother      Family History Negative Father      Other Cancer Father      Neurologic Disorder Sister      MS     Diabetes Brother      also, colon CA, colon surgery     Diabetes Sister      Hypertension Sister      Hyperlipidemia Sister      Anxiety Disorder Sister      Mental Illness Sister      bi polar     Obesity Sister      from some pills too     Diabetes Brother      Colon Cancer Brother      Other Cancer Brother      Anxiety Disorder Brother      Mental Illness Brother      schitzophenia     Obesity Brother      from pills too         Current Outpatient Prescriptions   Medication Sig Dispense Refill     Ascorbic Acid (VITAMIN C CR) 1000 MG TBCR        aspirin 81 MG tablet Take by mouth as needed        cetirizine (ZYRTEC) 10 MG tablet Take 1 tablet (10 mg) by mouth every evening 90 tablet 1     Cholecalciferol (VITAMIN D3 PO) Take 2,000 Units by mouth daily       Cyanocobalamin (CVS VITAMIN B12) 2000 MCG TABS        hydrochlorothiazide (HYDRODIURIL) 25 MG tablet TAKE 1 TABLET (25 MG) BY MOUTH DAILY 90 tablet 3     IBANdronate (BONIVA) 150 MG tablet Take 1 tablet (150 mg) by mouth every 30 days 3 tablet 3     lisinopril  (PRINIVIL/ZESTRIL) 5 MG tablet Take 1 tablet (5 mg) by mouth daily 90 tablet 3     Multiple Vitamins-Minerals (MULTIVITAMIN & MINERAL PO) Take 1 tablet by mouth daily       omega-3 fatty acids 1200 MG capsule Take 1 capsule by mouth daily 90 capsule      Pseudoephedrine HCl (SUDAFED PO) Take 240 mg by mouth every 8 hours as needed for congestion       simvastatin (ZOCOR) 10 MG tablet TAKE ONE TABLET BY MOUTH EVERY NIGHT AT BEDTIME 90 tablet 2     XIIDRA 5 % opthalmic solution INSTILL 1 DROP INTO EACH EYE EVERY 12 HOURS  12     loperamide (IMODIUM A-D) 2 MG tablet Take 2 mg by mouth as needed for diarrhea         Reviewed and updated as needed this visit by clinical staff  Tobacco  Allergies  Meds  Med Hx  Surg Hx  Fam Hx  Soc Hx      Reviewed and updated as needed this visit by Provider         ROS:  CONSTITUTIONAL: NEGATIVE for fever, chills, change in weight  EYES: NEGATIVE for vision changes or irritation  ENT/MOUTH: runny nose, nasal congestion   RESP: NEGATIVE for significant cough or SOB  CV: NEGATIVE for chest pain, palpitations or peripheral edema  MUSCULOSKELETAL: bilateral upper thigh pain   NEURO: NEGATIVE for weakness, dizziness or paresthesias  ROS otherwise negative    OBJECTIVE:                                                    /66  Pulse 99  Temp 97.3  F (36.3  C) (Oral)  Resp 16  Wt 143 lb 6.4 oz (65 kg)  SpO2 98%  BMI 27.1 kg/m2  Body mass index is 27.1 kg/(m^2).   GENERAL: healthy, alert, well nourished, well hydrated, no distress  EYES: Eyes grossly normal to inspection, extraocular movements - intact, and PERRL  HENT:   Nose- normal, no bleeding noted. ; Mouth- no ulcers, no lesions  NECK: no tenderness, no adenopathy, no asymmetry, no masses, no stiffness  RESP: lungs clear to auscultation - no rales, no rhonchi, no wheezes  CV: regular rates and rhythm,    NEURO: strength and tone- normal, sensory exam- grossly normal, mentation- intact, speech- normal, reflexes-  symmetric  MS/ EXT; no LE edema, no elena tenderness, Dorsalis pedis pulse 2+ chucky feet.         ASSESSMENT/PLAN:                                                       (M79.604,  M79.605) Leg pain, bilateral  (primary encounter diagnosis)  Plan: Patient has stopped simvastatin about a week ago, will check CK total, ESR: Erythrocyte sedimentation rate.pt was told I will contact her after results and proceed accordingly.           (J34.89) Stuffy and runny nose  Plan: Started on cetirizine (ZYRTEC) 10 MG tablet as directed.explained clearly about the medication,insructions and side effects.            (R04.0) Epistaxis  Plan: Resolved completely, has not seen ENT.      (Z23) Need for shingles vaccine  Plan: ZOSTER VACCINE RECOMBINANT ADJUVANTED IM NJX            Monica Sultana MD  [facility]

## 2018-10-23 LAB — CK SERPL-CCNC: 116 U/L (ref 30–225)

## 2018-12-10 ENCOUNTER — OFFICE VISIT (OUTPATIENT)
Dept: URGENT CARE | Facility: URGENT CARE | Age: 78
End: 2018-12-10
Payer: COMMERCIAL

## 2018-12-10 VITALS
DIASTOLIC BLOOD PRESSURE: 74 MMHG | SYSTOLIC BLOOD PRESSURE: 132 MMHG | BODY MASS INDEX: 27.4 KG/M2 | OXYGEN SATURATION: 96 % | WEIGHT: 145 LBS | HEART RATE: 76 BPM

## 2018-12-10 DIAGNOSIS — J01.90 ACUTE SINUSITIS WITH SYMPTOMS > 10 DAYS: Primary | ICD-10-CM

## 2018-12-10 PROCEDURE — 99213 OFFICE O/P EST LOW 20 MIN: CPT | Performed by: FAMILY MEDICINE

## 2018-12-10 RX ORDER — AMOXICILLIN 875 MG
875 TABLET ORAL 2 TIMES DAILY
Qty: 20 TABLET | Refills: 0 | Status: SHIPPED | OUTPATIENT
Start: 2018-12-10 | End: 2019-05-29

## 2018-12-10 NOTE — PROGRESS NOTES
SUBJECTIVE: Sophia Olivarez is a 78 year old female here with concerns about sinus infection.  She states onset  of symptoms were greater than 10 days with sinus pressure and drainage.  Course of illness is worsening.    Severity: moderate  Current and Associated symptoms: cold symptoms  Predisposing factors include none.   Recent treatment has included: OTC's  Allergic symptoms include: none    Past Medical History:   Diagnosis Date     Benign neoplasm of skin, site unspecified     sees derm.     Calculus of kidney     3 passed spont.     Chronic subinvolution of uterus      Disorder of bone and cartilage, unspecified     osteopenia     Dyspepsia and other specified disorders of function of stomach     dyspepsia     Generalized osteoarthrosis, unspecified site      H/O thyroid nodule     f/u with endocrinologist     Hematuria     microscopic     HTN (hypertension)      Malignant neoplasm of corpus uteri, except isthmus (H)     surgery and radiation rx.     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      Nontoxic multinodular goiter     sees endocrine     Other and unspecified hyperlipidemia      Allergies   Allergen Reactions     Aspirin      Atorvastatin Calcium      lipitor, myalgia, zocor     Codeine Sulfate      Difficulty breathing     Epidural Tray      Vomiting and diarrhea     Magnesium Citrate Diarrhea     Vomiting       Meperidine Hcl      demerol GI     Questran [Cholestyramine] Nausea     Social History     Tobacco Use     Smoking status: Never Smoker     Smokeless tobacco: Never Used   Substance Use Topics     Alcohol use: No       ROS:   no rash  no vomiting    OBJECTIVE:  /74   Pulse 76   Wt 65.8 kg (145 lb)   SpO2 96%   BMI 27.40 kg/m    NAD  EYES: clear, no mattering  EARS: TM's clear, no redness/buldging, canals  clear, no swelling/redness  NOSE: discolored discharge chucky. Nares  THROAT: clear, no erythema, no exudate  SINUS: maxillary tenderness with palpation  LUNGS: CTAB, no rhonchi/wheeze/rales      ICD-10-CM    1. Acute sinusitis with symptoms > 10 days J01.90 amoxicillin (AMOXIL) 875 MG tablet       Follow up with primary clinic if not improving

## 2019-03-12 ENCOUNTER — TRANSFERRED RECORDS (OUTPATIENT)
Dept: HEALTH INFORMATION MANAGEMENT | Facility: CLINIC | Age: 79
End: 2019-03-12

## 2019-03-27 ENCOUNTER — TRANSFERRED RECORDS (OUTPATIENT)
Dept: HEALTH INFORMATION MANAGEMENT | Facility: CLINIC | Age: 79
End: 2019-03-27

## 2019-04-11 DIAGNOSIS — K58.0 IRRITABLE BOWEL SYNDROME WITH DIARRHEA: Primary | ICD-10-CM

## 2019-04-11 NOTE — TELEPHONE ENCOUNTER
Requested Prescriptions   Pending Prescriptions Disp Refills     diphenoxylate-atropine (LOMOTIL) 2.5-0.025 MG tablet [Pharmacy Med Name: - DIPHENOX/ATROPINE 2.5/.02 2.5-0.025 TAB] 90 tablet 3     Sig: TAKE ONE TABLET BY MOUTH 4 TIMES A DAY AS NEEDED FOR DIARRHEA       There is no refill protocol information for this order      Last Written Prescription Date:  Not on meds list  Last Fill Quantity: 11/28/16,  # 90refills: 1  Last office visit: 10/22/2018 with prescribing provider:     Future Office Visit:   Next 5 appointments (look out 90 days)    Jun 03, 2019  8:40 AM CDT  PHYSICAL with Monica [facility]ut[facility]ana MD  [facility] [address]  [facility] [id number] [phone]

## 2019-04-11 NOTE — TELEPHONE ENCOUNTER
This medication is listed as discontinued in her record, please call the patient to find out why she is now requesting it.  She may need evaluation with her primary doctor.

## 2019-04-12 RX ORDER — DIPHENOXYLATE HCL/ATROPINE 2.5-.025MG
TABLET ORAL
Qty: 90 TABLET | Refills: 0 | Status: SHIPPED | OUTPATIENT
Start: 2019-04-12 | End: 2019-06-25

## 2019-04-12 NOTE — TELEPHONE ENCOUNTER
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   Call to pt. She states she has very loose stools regularly with occasional \"blow outs\". She just had a colonoscopy but she states the GI doctor didn't tell her much. scanned in Epic. She reports she has information on IBS diet and saw a Dietitian in the past. She also states she is Lactose intolerant so avoids dairy.     She states the Imodium just doesn't work as well as the Lomotil, but hasnt taken the Lomotil for a long time. She wants to restart it.       "

## 2019-05-29 ENCOUNTER — OFFICE VISIT (OUTPATIENT)
Dept: INTERNAL MEDICINE | Facility: CLINIC | Age: 79
End: 2019-05-29
Payer: COMMERCIAL

## 2019-05-29 VITALS
TEMPERATURE: 97.9 F | DIASTOLIC BLOOD PRESSURE: 68 MMHG | OXYGEN SATURATION: 98 % | WEIGHT: 142 LBS | SYSTOLIC BLOOD PRESSURE: 126 MMHG | BODY MASS INDEX: 26.81 KG/M2 | HEART RATE: 68 BPM | RESPIRATION RATE: 18 BRPM | HEIGHT: 61 IN

## 2019-05-29 DIAGNOSIS — J01.90 ACUTE SINUSITIS WITH SYMPTOMS > 10 DAYS: Primary | ICD-10-CM

## 2019-05-29 PROCEDURE — 99213 OFFICE O/P EST LOW 20 MIN: CPT | Performed by: PHYSICIAN ASSISTANT

## 2019-05-29 RX ORDER — DOXYCYCLINE 100 MG/1
100 CAPSULE ORAL 2 TIMES DAILY
Qty: 20 CAPSULE | Refills: 0 | Status: SHIPPED | OUTPATIENT
Start: 2019-05-29 | End: 2019-06-08

## 2019-05-29 RX ORDER — ECHINACEA PURPUREA EXTRACT 125 MG
TABLET ORAL
Qty: 15 ML | Refills: 3 | Status: SHIPPED | OUTPATIENT
Start: 2019-05-29 | End: 2020-07-09

## 2019-05-29 ASSESSMENT — MIFFLIN-ST. JEOR: SCORE: 1071.49

## 2019-05-29 NOTE — PROGRESS NOTES
"Subjective     Sophia Olivarez is a 78 year old female who presents to clinic today for the following health issues:    HPI   Acute Illness   Acute illness concerns: Sinus  Onset: 5 months    Fever: no    Chills/Sweats: no    Headache (location?): no    Sinus Pressure:no    Conjunctivitis:  no    Ear Pain: no    Rhinorrhea: YES    Congestion: YES- had a large green mucous plug come out of her nose this morning    Sore Throat: no     Cough: no    Wheeze: no    Decreased Appetite: no    Nausea: no    Vomiting: no    Diarrhea:  no    Dysuria/Freq.: no    Fatigue/Achiness: no    Sick/Strep Exposure: no     Therapies Tried and outcome: zyrtec-has not made a difference    -------------------------------------        -------------------------------------  Reviewed and updated as needed this visit by Provider  Allergies  Meds         Review of Systems   ROS COMP: Constitutional, HEENT, cardiovascular, pulmonary, gi and gu systems are negative, except as otherwise noted.      Objective    /68 (BP Location: Left arm, Patient Position: Chair, Cuff Size: Adult Regular)   Pulse 68   Temp 97.9  F (36.6  C) (Oral)   Resp 18   Ht 1.549 m (5' 1\")   Wt 64.4 kg (142 lb)   SpO2 98%   BMI 26.83 kg/m    Body mass index is 26.83 kg/m .  Physical Exam   GENERAL: healthy, alert and no distress  NECK: no adenopathy, no asymmetry, masses, or scars and thyroid normal to palpation  HENT, ears clear TM slightly retracted. Nose: mild mucus membrane swelling   Patient brought with to clinic the large mucus plug that she blew out this am,   Green tinged   RESP: lungs clear to auscultation - no rales, rhonchi or wheezes  CV: regular rate and rhythm, normal S1 S2, no S3 or S4, no murmur, click or rub, no peripheral edema and peripheral pulses strong  MS: no gross musculoskeletal defects noted, no edema  SKIN: no suspicious lesions or rashes    Diagnostic Test Results:  none         Assessment & Plan     1. Acute sinusitis with " "symptoms > 10 days    - doxycycline hyclate (VIBRAMYCIN) 100 MG capsule; Take 1 capsule (100 mg) by mouth 2 times daily for 10 days  Dispense: 20 capsule; Refill: 0  - sodium chloride (OCEAN) 0.65 % nasal spray; Use daily as needed for congestion  Dispense: 15 mL; Refill: 3     BMI:   Estimated body mass index is 26.83 kg/m  as calculated from the following:    Height as of this encounter: 1.549 m (5' 1\").    Weight as of this encounter: 64.4 kg (142 lb).           Patient Instructions   Stop with zyrtec - cetirizine while on the antibiotics.- for 10 days.     Steaming              Return in about 5 days (around 6/3/2019) for Routine Visit, regular primary provider.    Norma Garrett PA-C  [facility]      "

## 2019-06-03 ENCOUNTER — OFFICE VISIT (OUTPATIENT)
Dept: INTERNAL MEDICINE | Facility: CLINIC | Age: 79
End: 2019-06-03
Payer: COMMERCIAL

## 2019-06-03 VITALS
WEIGHT: 143.7 LBS | OXYGEN SATURATION: 97 % | SYSTOLIC BLOOD PRESSURE: 124 MMHG | BODY MASS INDEX: 27.13 KG/M2 | TEMPERATURE: 97.7 F | HEIGHT: 61 IN | RESPIRATION RATE: 17 BRPM | HEART RATE: 79 BPM | DIASTOLIC BLOOD PRESSURE: 82 MMHG

## 2019-06-03 DIAGNOSIS — Z00.00 ROUTINE HISTORY AND PHYSICAL EXAMINATION OF ADULT: Primary | ICD-10-CM

## 2019-06-03 DIAGNOSIS — C55 MALIGNANT NEOPLASM OF UTERUS, UNSPECIFIED SITE (H): ICD-10-CM

## 2019-06-03 DIAGNOSIS — Z23 NEED FOR VACCINATION: ICD-10-CM

## 2019-06-03 DIAGNOSIS — I10 ESSENTIAL HYPERTENSION: ICD-10-CM

## 2019-06-03 DIAGNOSIS — M81.0 OSTEOPOROSIS, UNSPECIFIED OSTEOPOROSIS TYPE, UNSPECIFIED PATHOLOGICAL FRACTURE PRESENCE: ICD-10-CM

## 2019-06-03 DIAGNOSIS — E78.2 MIXED HYPERLIPIDEMIA: ICD-10-CM

## 2019-06-03 DIAGNOSIS — J30.2 SEASONAL ALLERGIC RHINITIS, UNSPECIFIED TRIGGER: ICD-10-CM

## 2019-06-03 LAB
CANCER AG125 SERPL-ACNC: 5 U/ML (ref 0–30)
HGB BLD-MCNC: 12.5 G/DL (ref 11.7–15.7)

## 2019-06-03 PROCEDURE — 85018 HEMOGLOBIN: CPT | Performed by: INTERNAL MEDICINE

## 2019-06-03 PROCEDURE — 86304 IMMUNOASSAY TUMOR CA 125: CPT | Performed by: INTERNAL MEDICINE

## 2019-06-03 PROCEDURE — 99397 PER PM REEVAL EST PAT 65+ YR: CPT | Mod: 25 | Performed by: INTERNAL MEDICINE

## 2019-06-03 PROCEDURE — 80053 COMPREHEN METABOLIC PANEL: CPT | Performed by: INTERNAL MEDICINE

## 2019-06-03 PROCEDURE — 90715 TDAP VACCINE 7 YRS/> IM: CPT | Performed by: INTERNAL MEDICINE

## 2019-06-03 PROCEDURE — 90471 IMMUNIZATION ADMIN: CPT | Performed by: INTERNAL MEDICINE

## 2019-06-03 PROCEDURE — 80061 LIPID PANEL: CPT | Performed by: INTERNAL MEDICINE

## 2019-06-03 PROCEDURE — 99213 OFFICE O/P EST LOW 20 MIN: CPT | Mod: 25 | Performed by: INTERNAL MEDICINE

## 2019-06-03 PROCEDURE — 36415 COLL VENOUS BLD VENIPUNCTURE: CPT | Performed by: INTERNAL MEDICINE

## 2019-06-03 RX ORDER — SIMVASTATIN 10 MG
TABLET ORAL
Qty: 90 TABLET | Refills: 3 | Status: SHIPPED | OUTPATIENT
Start: 2019-06-03 | End: 2020-06-22

## 2019-06-03 RX ORDER — IBANDRONATE SODIUM 150 MG/1
150 TABLET, FILM COATED ORAL
Qty: 3 TABLET | Refills: 3 | Status: SHIPPED | OUTPATIENT
Start: 2019-06-03 | End: 2020-06-08

## 2019-06-03 RX ORDER — FLUTICASONE PROPIONATE 50 MCG
2 SPRAY, SUSPENSION (ML) NASAL DAILY
Qty: 9.9 ML | Refills: 3 | Status: SHIPPED | OUTPATIENT
Start: 2019-06-03 | End: 2020-06-08

## 2019-06-03 RX ORDER — HYDROCHLOROTHIAZIDE 25 MG/1
TABLET ORAL
Qty: 90 TABLET | Refills: 3 | Status: SHIPPED | OUTPATIENT
Start: 2019-06-03 | End: 2020-07-09

## 2019-06-03 RX ORDER — LISINOPRIL 5 MG/1
5 TABLET ORAL DAILY
Qty: 90 TABLET | Refills: 3 | Status: SHIPPED | OUTPATIENT
Start: 2019-06-03 | End: 2020-07-17

## 2019-06-03 RX ORDER — SACCHAROMYCES BOULARDII 250 MG
250 CAPSULE ORAL 2 TIMES DAILY
COMMUNITY

## 2019-06-03 ASSESSMENT — ENCOUNTER SYMPTOMS
SHORTNESS OF BREATH: 0
CONSTIPATION: 0
EYE PAIN: 0
FEVER: 0
MYALGIAS: 0
DIARRHEA: 1
FREQUENCY: 0
HEMATURIA: 0
DIZZINESS: 0
CHILLS: 0
BREAST MASS: 0
NERVOUS/ANXIOUS: 0
NAUSEA: 0
ABDOMINAL PAIN: 0
HEMATOCHEZIA: 0
SORE THROAT: 0
DYSURIA: 0
PALPITATIONS: 0
HEARTBURN: 0
PARESTHESIAS: 0
WEAKNESS: 0
JOINT SWELLING: 0
ARTHRALGIAS: 0
HEADACHES: 0
COUGH: 0

## 2019-06-03 ASSESSMENT — MIFFLIN-ST. JEOR: SCORE: 1079.2

## 2019-06-03 ASSESSMENT — ACTIVITIES OF DAILY LIVING (ADL): CURRENT_FUNCTION: NO ASSISTANCE NEEDED

## 2019-06-03 NOTE — LETTER
June 4, 2019      Sophia Olivarez  [address]  [facility] [address]        Dear ,    Your lab results are within acceptable limits.     Resulted Orders   Hemoglobin   Result Value Ref Range    Hemoglobin 12.5 11.7 - 15.7 g/dL   Comprehensive metabolic panel   Result Value Ref Range    Sodium 141 133 - 144 mmol/L    Potassium 3.6 3.4 - 5.3 mmol/L    Chloride 105 94 - 109 mmol/L    Carbon Dioxide 28 20 - 32 mmol/L    Anion Gap 8 3 - 14 mmol/L    Glucose 96 70 - 99 mg/dL      Comment:      Fasting specimen    Urea Nitrogen 12 7 - 30 mg/dL    Creatinine 0.53 0.52 - 1.04 mg/dL    GFR Estimate >90 >60 mL/min/[1.73_m2]      Comment:      Non  GFR Calc  Starting 12/18/2018, serum creatinine based estimated GFR (eGFR) will be   calculated using the Chronic Kidney Disease Epidemiology Collaboration   (CKD-EPI) equation.      GFR Estimate If Black >90 >60 mL/min/[1.73_m2]      Comment:       GFR Calc  Starting 12/18/2018, serum creatinine based estimated GFR (eGFR) will be   calculated using the Chronic Kidney Disease Epidemiology Collaboration   (CKD-EPI) equation.      Calcium 9.1 8.5 - 10.1 mg/dL    Bilirubin Total 0.8 0.2 - 1.3 mg/dL    Albumin 3.7 3.4 - 5.0 g/dL    Protein Total 7.5 6.8 - 8.8 g/dL    Alkaline Phosphatase 78 40 - 150 U/L    ALT 34 0 - 50 U/L    AST 29 0 - 45 U/L   Lipid panel reflex to direct LDL Fasting   Result Value Ref Range    Cholesterol 183 <200 mg/dL    Triglycerides 102 <150 mg/dL      Comment:      Fasting specimen    HDL Cholesterol 78 >49 mg/dL    LDL Cholesterol Calculated 85 <100 mg/dL      Comment:      Desirable:       <100 mg/dl    Non HDL Cholesterol 105 <130 mg/dL      Result Value Ref Range     5 0 - 30 U/mL      Comment:      Assay Method:  Chemiluminescence using Siemens Centaur XP       If you have any questions or concerns, please call the clinic at the number listed above.       Sincerely,        Monica LARSON  MD [address]

## 2019-06-03 NOTE — NURSING NOTE
"/82   Pulse 79   Temp 97.7  F (36.5  C) (Oral)   Resp 17   Ht 1.549 m (5' 1\")   Wt 65.2 kg (143 lb 11.2 oz)   SpO2 97%   BMI 27.15 kg/m    Patient in for annual Medicare Visit.  Dinorah Luis CMA    "

## 2019-06-03 NOTE — NURSING NOTE
Prior to injection, verified patient identity using patient's name and date of birth.  Due to injection administration, patient instructed to remain in clinic for 15 minutes afterwards, and to report any adverse reaction to me or the  staff immediately.    tdap    Drug Amount Wasted:  None.  Vial/Syringe: Single dose vial    Screening Questionnaire for Adult Immunization     Are you sick today?   No    Do you have allergies to medications, food or any vaccine?   No    Have you ever had a serious reaction after receiving a vaccination?   No    Do you have a long-term health problem with heart disease, lung disease,  asthma, kidney disease, diabetes, anemia, metabolic or blood disease?   No    Do you have cancer, leukemia, AIDS, or any immune system problem?   No    Do you take cortisone, prednisone, other steroids, or anticancer drugs, or  have you had any x-ray (radiation) treatments?   No    Have you had a seizure, brain, or other nervous system problem?   No    During the past year, have you received a transfusion of blood or blood       products, or been given a medicine called immune (gamma) globulin?   No    For women: Are you pregnant or is there a chance you could become         pregnant during the next month?   No    Have you received any vaccinations in the past 4 weeks?   No     Immunization questionnaire answers were all negative.           Screening performed by Fariba Michel on 6/3/2019 at 9:38 AM.

## 2019-06-03 NOTE — PROGRESS NOTES
"SUBJECTIVE:   Sophia Olivarez is a 78 year old female who presents for Preventive Visit.    Are you in the first 12 months of your Medicare coverage?  No    Healthy Habits:     In general, how would you rate your overall health?  Good    Frequency of exercise:  6-7 days/week    Duration of exercise:  Greater than 60 minutes    Do you usually eat at least 4 servings of fruit and vegetables a day, include whole grains    & fiber and avoid regularly eating high fat or \"junk\" foods?  Yes    Taking medications regularly:  Yes    Medication side effects:  Not applicable    Ability to successfully perform activities of daily living:  No assistance needed    Home Safety:  No safety concerns identified    Hearing Impairment:  No hearing concerns    In the past 6 months, have you been bothered by leaking of urine?  No    In general, how would you rate your overall mental or emotional health?  Good      PHQ-2 Total Score: 0    Additional concerns today:  Yes    Do you feel safe in your environment? No    Do you have a Health Care Directive? Yes: Advance Directive has been received and scanned.      Fall risk  Fallen 2 or more times in the past year?: No  Any fall with injury in the past year?: No    Cognitive Screening   1) Repeat 3 items (Leader, Season, Table)    2) Clock draw: NORMAL  3) 3 item recall: Recalls 2 objects   Results: NORMAL clock, 1-2 items recalled: COGNITIVE IMPAIRMENT LESS LIKELY    Mini-CogTM Copyright [employer]. Licensed by the author for use in [facility]; reprinted with permission (thomas@.[facility]). All rights reserved.      Do you have sleep apnea, excessive snoring or daytime drowsiness?: no    Reviewed and updated as needed this visit by clinical staff  Tobacco  Allergies  Meds  Med Hx  Surg Hx  Fam Hx  Soc Hx        Reviewed and updated as needed this visit by Provider          Past Medical History:   Diagnosis Date     Benign neoplasm of skin, site unspecified     sees derm.     " Calculus of kidney     3 passed spont.     Chronic subinvolution of uterus      Disorder of bone and cartilage, unspecified     osteopenia     Dyspepsia and other specified disorders of function of stomach     dyspepsia     Generalized osteoarthrosis, unspecified site      H/O thyroid nodule     f/u with endocrinologist     Hematuria     microscopic     HTN (hypertension)      Malignant neoplasm of corpus uteri, except isthmus (H)     surgery and radiation rx.     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      Nontoxic multinodular goiter     sees endocrine     Other and unspecified hyperlipidemia        Past Surgical History:   Procedure Laterality Date     ABDOMEN SURGERY  2005    uterine cancer Hysterectiomy     BIOPSY      many times from nodules from thyroid     C NONSPECIFIC PROCEDURE  1992    Cholecystectomy. abstracted 6/24/02.     C NONSPECIFIC PROCEDURE      Thyroidectomy. abstracted 6/24/02.     C NONSPECIFIC PROCEDURE      Dilatation & Curettage X 2. abstracted 6/24/02.     C NONSPECIFIC PROCEDURE      Cystoscopy. abstracted 6/24/02.     C NONSPECIFIC PROCEDURE      hysterctomy for uterine cancer.     CHOLECYSTECTOMY  1992     GYN SURGERY  2005    uterine cancer     HC COLONOSCOPY THRU STOMA, DIAGNOSTIC  10/2007    due q 5 yrs     HEAD & NECK SURGERY      right thyroid taken out       Current Outpatient Medications   Medication Sig Dispense Refill     Ascorbic Acid (VITAMIN C CR) 1000 MG TBCR        aspirin 81 MG tablet Take by mouth as needed        cetirizine (ZYRTEC) 10 MG tablet Take 1 tablet (10 mg) by mouth every evening 90 tablet 1     Cholecalciferol (VITAMIN D3 PO) Take 2,000 Units by mouth daily       Cyanocobalamin ([facility] VITAMIN B12) 2000 MCG TABS        diphenoxylate-atropine (LOMOTIL) 2.5-0.025 MG tablet TAKE ONE TABLET BY  MOUTH 4 TIMES A DAY AS NEEDED FOR DIARRHEA 90 tablet 0     doxycycline hyclate (VIBRAMYCIN) 100 MG capsule Take 1 capsule (100 mg) by mouth 2 times daily for 10 days 20 capsule 0     fluticasone (FLONASE) 50 MCG/ACT nasal spray Spray 2 sprays into both nostrils daily 9.9 mL 3     hydrochlorothiazide (HYDRODIURIL) 25 MG tablet TAKE 1 TABLET (25 MG) BY MOUTH DAILY 90 tablet 3     IBANdronate (BONIVA) 150 MG tablet Take 1 tablet (150 mg) by mouth every 30 days 3 tablet 3     lisinopril (PRINIVIL/ZESTRIL) 5 MG tablet Take 1 tablet (5 mg) by mouth daily 90 tablet 3     loperamide (IMODIUM A-D) 2 MG tablet Take 2 mg by mouth as needed for diarrhea       Multiple Vitamins-Minerals (MULTIVITAMIN & MINERAL PO) Take 1 tablet by mouth daily       omega-3 fatty acids 1200 MG capsule Take 1 capsule by mouth daily 90 capsule      Pseudoephedrine HCl (SUDAFED PO) Take 240 mg by mouth every 8 hours as needed for congestion       saccharomyces boulardii (FLORASTOR) 250 MG capsule Take 250 mg by mouth 2 times daily       simvastatin (ZOCOR) 10 MG tablet TAKE ONE TABLET BY MOUTH EVERY NIGHT AT BEDTIME 90 tablet 3     sodium chloride (OCEAN) 0.65 % nasal spray Use daily as needed for congestion 15 mL 3     XIIDRA 5 % opthalmic solution INSTILL 1 DROP INTO EACH EYE EVERY 12 HOURS  12       Family History   Problem Relation Age of Onset     Family History Negative Mother      Other Cancer Mother      Osteoporosis Mother      Family History Negative Father      Other Cancer Father      Neurologic Disorder Sister         MS     Diabetes Brother         also, colon CA, colon surgery     Diabetes Sister      Hypertension Sister      Hyperlipidemia Sister      Anxiety Disorder Sister      Mental Illness Sister         bi polar     Obesity Sister         from some pills too     Diabetes Brother      Colon Cancer Brother      Other Cancer Brother      Anxiety Disorder Brother      Mental Illness Brother         schitzophenia     Obesity Brother          from pills too       Social History     Tobacco Use     Smoking status: Never Smoker     Smokeless tobacco: Never Used   Substance Use Topics     Alcohol use: No     If you drink alcohol do you typically have >3 drinks per day or >7 drinks per week? No    Alcohol Use 6/3/2019   Prescreen: >3 drinks/day or >7 drinks/week? Not Applicable   Prescreen: >3 drinks/day or >7 drinks/week? -   No flowsheet data found.      Pt also complains of nasal congestion, runny nose and postnasal drainage since 1 month.  Patient was seen in urgent care and she was started on doxycycline for possible sinus infection.      Hyperlipidemia Follow-Up      Are you having any of the following symptoms? (Select all that apply)  No complaints of shortness of breath, chest pain or pressure.  No increased sweating or nausea with activity.  No left-sided neck or arm pain.  No complaints of pain in calves when walking 1-2 blocks.    Are you regularly taking any medication or supplement to lower your cholesterol?   No    Are you having muscle aches or other side effects that you think could be caused by your cholesterol lowering medication?  No      Hypertension Follow-up      Do you check your blood pressure regularly outside of the clinic? No     Are you following a low salt diet? Yes    Are your blood pressures ever more than 140 on the top number (systolic) OR more   than 90 on the bottom number (diastolic), for example 140/90? No     Multiple Thyroid nodules; sees endocrinologist  Dr Chew     Osteoporosis ; on Boniva 150 mg once a month     Uterine cancer; needs  per her oncologist.     Has history of irritable bowel syndrome and takes Imodium/Lomotil as needed        Current providers sharing in care for this patient include:   Patient Care Team:  Monica Sultana MD as PCP - General  Monica Sultana MD as Assigned PCP    The following health maintenance items are reviewed in Epic and correct as of  "today:  Health Maintenance   Topic Date Due     PHQ-2  01/01/2019     DTAP/TDAP/TD IMMUNIZATION (3 - Td) 03/23/2019     FALL RISK ASSESSMENT  06/01/2019     MEDICARE ANNUAL WELLNESS VISIT  06/01/2019     DEXA  06/20/2020     LIPID  06/01/2023     ADVANCED DIRECTIVE PLANNING  06/01/2023     COLONOSCOPY  03/27/2029     INFLUENZA VACCINE  Completed     ZOSTER IMMUNIZATION  Completed     IPV IMMUNIZATION  Aged Out     MENINGITIS IMMUNIZATION  Aged Out          Review of Systems   Constitutional: Negative for chills and fever.   HENT: Positive for congestion. Negative for ear pain, hearing loss and sore throat.    Eyes: Negative for pain and visual disturbance.   Respiratory: Negative for cough and shortness of breath.    Cardiovascular: Negative for chest pain, palpitations and peripheral edema.   Gastrointestinal: Positive for diarrhea. Negative for abdominal pain, constipation, heartburn, hematochezia and nausea.   Breasts:  Negative for tenderness, breast mass and discharge.   Genitourinary: Negative for dysuria, frequency, genital sores, hematuria, pelvic pain, urgency, vaginal bleeding and vaginal discharge.   Musculoskeletal: Negative for arthralgias, joint swelling and myalgias.   Skin: Negative for rash.   Neurological: Negative for dizziness, weakness, headaches and paresthesias.   Psychiatric/Behavioral: Negative for mood changes. The patient is not nervous/anxious.           OBJECTIVE:   /82   Pulse 79   Temp 97.7  F (36.5  C) (Oral)   Resp 17   Ht 1.549 m (5' 1\")   Wt 65.2 kg (143 lb 11.2 oz)   SpO2 97%   BMI 27.15 kg/m   Estimated body mass index is 27.15 kg/m  as calculated from the following:    Height as of this encounter: 1.549 m (5' 1\").    Weight as of this encounter: 65.2 kg (143 lb 11.2 oz).  Physical Exam  GENERAL: healthy, alert and no distress  EYES: Eyes grossly normal to inspection, PERRL and conjunctivae and sclerae normal  HENT: Lt ear canal cerumen noted( removed using curette)  " and TM's normal, nose and mouth without ulcers or lesions  NECK: no adenopathy, no asymmetry, masses, or scars and thyroid normal to palpation  RESP: lungs clear to auscultation - no rales, rhonchi or wheezes  BREAST: normal without masses, tenderness or nipple discharge and no palpable axillary masses or adenopathy  CV: regular rate and rhythm, normal S1 S2, no S3 or S4, no murmur, click or rub,  peripheral pulses strong  ABDOMEN: soft, nontender, no hepatosplenomegaly, no masses and bowel sounds normal  MS: no gross musculoskeletal defects noted, no edema  SKIN: Scattered seborrheic keratosis noted on trunk( sees dermatologist)  NEURO: Normal strength and tone, mentation intact and speech normal  PSYCH: mentation appears normal, affect normal/bright      ASSESSMENT / PLAN:     (Z00.00) Routine history and physical examination of adult  (primary encounter diagnosis)  Plan: Hemoglobin, Comprehensive metabolic panel          (I10) Essential hypertension  Comment: Blood pressure controlled  Plan: hydrochlorothiazide (HYDRODIURIL) 25 MG tablet, lisinopril (PRINIVIL/ZESTRIL) 5 MG tablet refilled.explained clearly about the medication,insructions and side effects.            (M81.0) Osteoporosis, unspecified osteoporosis type, unspecified pathological fracture presence  Plan: IBANdronate (BONIVA) 150 MG tablet refilled.explained clearly about the medication,insructions and side effects.            (E78.2) Mixed hyperlipidemia  Plan: simvastatin (ZOCOR) 10 MG tablet refilled.explained clearly about the medication,insructions and side effects. Check  Lipid panel         reflex to direct LDL Fasting            (C55) Malignant neoplasm of uterus, unspecified site (H)  Plan:             (J30.2) Seasonal allergic rhinitis, unspecified trigger  Plan: Patient was started on fluticasone (FLONASE) 50 MCG/ACT nasal spray as directed.explained clearly about the medication,insructions and side effects.             End of Life  "Planning:  Patient currently has an advanced directive: Yes: Advance Directive has been received and scanned.    COUNSELING:  Reviewed preventive health counseling, as reflected in patient instructions       Regular exercise       Healthy diet/nutrition       Immunizations    Vaccinated for: TDAP          Estimated body mass index is 27.15 kg/m  as calculated from the following:    Height as of this encounter: 1.549 m (5' 1\").    Weight as of this encounter: 65.2 kg (143 lb 11.2 oz).         reports that she has never smoked. She has never used smokeless tobacco.      Appropriate preventive services were discussed with this patient, including applicable screening as appropriate for cardiovascular disease, diabetes, osteopenia/osteoporosis, and glaucoma.  As appropriate for age/gender, discussed screening for colorectal cancer, prostate cancer, breast cancer, and cervical cancer. Checklist reviewing preventive services available has been given to the patient.    Reviewed patients plan of care and provided an AVS. The Basic Care Plan (routine screening as documented in Health Maintenance) for Sophai meets the Care Plan requirement. This Care Plan has been established and reviewed with the Patient.    Counseling Resources:  ATP IV Guidelines  Pooled Cohorts Equation Calculator  Breast Cancer Risk Calculator  FRAX Risk Assessment  ICSI Preventive Guidelines  Dietary Guidelines for Americans, 2010  [employer] My[employer]ate  ASA Prophylaxis  Lung CA Screening    Monica Sultana MD  [facility]    Identified Health Risks:  "

## 2019-06-04 LAB
ALBUMIN SERPL-MCNC: 3.7 G/DL (ref 3.4–5)
ALP SERPL-CCNC: 78 U/L (ref 40–150)
ALT SERPL W P-5'-P-CCNC: 34 U/L (ref 0–50)
ANION GAP SERPL CALCULATED.3IONS-SCNC: 8 MMOL/L (ref 3–14)
AST SERPL W P-5'-P-CCNC: 29 U/L (ref 0–45)
BILIRUB SERPL-MCNC: 0.8 MG/DL (ref 0.2–1.3)
BUN SERPL-MCNC: 12 MG/DL (ref 7–30)
CALCIUM SERPL-MCNC: 9.1 MG/DL (ref 8.5–10.1)
CHLORIDE SERPL-SCNC: 105 MMOL/L (ref 94–109)
CHOLEST SERPL-MCNC: 183 MG/DL
CO2 SERPL-SCNC: 28 MMOL/L (ref 20–32)
CREAT SERPL-MCNC: 0.53 MG/DL (ref 0.52–1.04)
GFR SERPL CREATININE-BSD FRML MDRD: >90 ML/MIN/{1.73_M2}
GLUCOSE SERPL-MCNC: 96 MG/DL (ref 70–99)
HDLC SERPL-MCNC: 78 MG/DL
LDLC SERPL CALC-MCNC: 85 MG/DL
NONHDLC SERPL-MCNC: 105 MG/DL
POTASSIUM SERPL-SCNC: 3.6 MMOL/L (ref 3.4–5.3)
PROT SERPL-MCNC: 7.5 G/DL (ref 6.8–8.8)
SODIUM SERPL-SCNC: 141 MMOL/L (ref 133–144)
TRIGL SERPL-MCNC: 102 MG/DL

## 2019-06-25 DIAGNOSIS — K58.0 IRRITABLE BOWEL SYNDROME WITH DIARRHEA: ICD-10-CM

## 2019-06-25 NOTE — TELEPHONE ENCOUNTER
Requested Prescriptions   Pending Prescriptions Disp Refills     diphenoxylate-atropine (LOMOTIL) 2.5-0.025 MG tablet [Pharmacy Med Name:   DIPHENOXYLATE-ATROPINE 2.5- 2.5-0.025 TAB]    Last Written Prescription Date:  4/12/19  Last Fill Quantity: 90,  # refills: 0   Last office visit: 6/3/2019 with prescribing provider:  Rd   Future Office Visit:     90 tablet 0     Sig: TAKE 1 TABLET BY MOUTH FOUR TIMES DAILY AS NEEDED FOR DIARRHEA       There is no refill protocol information for this order

## 2019-06-26 RX ORDER — DIPHENOXYLATE HCL/ATROPINE 2.5-.025MG
TABLET ORAL
Qty: 90 TABLET | Refills: 0 | Status: SHIPPED | OUTPATIENT
Start: 2019-06-26 | End: 2019-09-24

## 2019-07-08 ENCOUNTER — TRANSFERRED RECORDS (OUTPATIENT)
Dept: HEALTH INFORMATION MANAGEMENT | Facility: CLINIC | Age: 79
End: 2019-07-08

## 2019-07-08 LAB
GLUCOSE SERPL-MCNC: 91 MG/DL (ref 65–99)
HBA1C MFR BLD: 5.9 % (ref 4–6)
TSH SERPL-ACNC: 1.23 UIU/ML (ref 0.3–5)

## 2019-07-26 ENCOUNTER — APPOINTMENT (OUTPATIENT)
Age: 79
Setting detail: DERMATOLOGY
End: 2019-07-29

## 2019-07-26 VITALS — RESPIRATION RATE: 14 BRPM | HEIGHT: 61 IN | WEIGHT: 140 LBS

## 2019-07-26 DIAGNOSIS — D22 MELANOCYTIC NEVI: ICD-10-CM

## 2019-07-26 DIAGNOSIS — L81.4 OTHER MELANIN HYPERPIGMENTATION: ICD-10-CM

## 2019-07-26 DIAGNOSIS — L82.1 OTHER SEBORRHEIC KERATOSIS: ICD-10-CM

## 2019-07-26 DIAGNOSIS — D18.0 HEMANGIOMA: ICD-10-CM

## 2019-07-26 DIAGNOSIS — I83.9 ASYMPTOMATIC VARICOSE VEINS OF LOWER EXTREMITIES: ICD-10-CM

## 2019-07-26 DIAGNOSIS — Z87.2 PERSONAL HISTORY OF DISEASES OF THE SKIN AND SUBCUTANEOUS TISSUE: ICD-10-CM

## 2019-07-26 DIAGNOSIS — B35.1 TINEA UNGUIUM: ICD-10-CM

## 2019-07-26 PROBLEM — D22.5 MELANOCYTIC NEVI OF TRUNK: Status: ACTIVE | Noted: 2019-07-26

## 2019-07-26 PROBLEM — I83.92 ASYMPTOMATIC VARICOSE VEINS OF LEFT LOWER EXTREMITY: Status: ACTIVE | Noted: 2019-07-26

## 2019-07-26 PROBLEM — D18.01 HEMANGIOMA OF SKIN AND SUBCUTANEOUS TISSUE: Status: ACTIVE | Noted: 2019-07-26

## 2019-07-26 PROCEDURE — OTHER COUNSELING: OTHER

## 2019-07-26 PROCEDURE — 99214 OFFICE O/P EST MOD 30 MIN: CPT

## 2019-07-26 ASSESSMENT — LOCATION SIMPLE DESCRIPTION DERM
LOCATION SIMPLE: UPPER BACK
LOCATION SIMPLE: LEFT UPPER BACK
LOCATION SIMPLE: LEFT CALF
LOCATION SIMPLE: ABDOMEN
LOCATION SIMPLE: RIGHT THIGH
LOCATION SIMPLE: LEFT GREAT TOE
LOCATION SIMPLE: RIGHT GREAT TOE
LOCATION SIMPLE: RIGHT UPPER BACK

## 2019-07-26 ASSESSMENT — LOCATION DETAILED DESCRIPTION DERM
LOCATION DETAILED: RIGHT ANTERIOR PROXIMAL THIGH
LOCATION DETAILED: LEFT DISTAL CALF
LOCATION DETAILED: INFERIOR THORACIC SPINE
LOCATION DETAILED: LEFT SUPERIOR MEDIAL UPPER BACK
LOCATION DETAILED: RIGHT MID-UPPER BACK
LOCATION DETAILED: LEFT LATERAL ABDOMEN
LOCATION DETAILED: RIGHT DORSAL GREAT TOE
LOCATION DETAILED: LEFT DORSAL GREAT TOE
LOCATION DETAILED: RIGHT SUPERIOR MEDIAL UPPER BACK

## 2019-07-26 ASSESSMENT — LOCATION ZONE DERM
LOCATION ZONE: TRUNK
LOCATION ZONE: TRUNK
LOCATION ZONE: TOE
LOCATION ZONE: LEG

## 2019-07-26 NOTE — PROCEDURE: COUNSELING
Patient Specific Counseling (Will Not Stick From Patient To Patient): Pr states that they are asymptomatic.
Detail Level: Generalized
Detail Level: Simple
Detail Level: Detailed

## 2019-09-24 DIAGNOSIS — K58.0 IRRITABLE BOWEL SYNDROME WITH DIARRHEA: ICD-10-CM

## 2019-09-25 NOTE — TELEPHONE ENCOUNTER
Requested Prescriptions   Pending Prescriptions Disp Refills     diphenoxylate-atropine (LOMOTIL) 2.5-0.025 MG tablet [Pharmacy Med Name: DIPHENOX/ATROPINE 2.5/0.025 2.5-0.025 TAB] 90 tablet 0     Sig: TAKE 1 TABLET BY MOUTH FOUR TIMES DAILY AS NEEDED FOR DIARRHEA       There is no refill protocol information for this order      Last Written Prescription Date:  06/26/2019  Last Fill Quantity: 90,  # refills: 0   Last office visit: 6/3/2019 with prescribing provider:     Future Office Visit:

## 2019-09-26 RX ORDER — DIPHENOXYLATE HCL/ATROPINE 2.5-.025MG
TABLET ORAL
Qty: 90 TABLET | Refills: 3 | Status: SHIPPED | OUTPATIENT
Start: 2019-09-26 | End: 2021-06-08

## 2019-09-26 NOTE — TELEPHONE ENCOUNTER
Routing refill request to provider for review/approval because:  Drug not on the [facility] refill protocol   Zainab WALLACE RN

## 2019-10-01 ENCOUNTER — HEALTH MAINTENANCE LETTER (OUTPATIENT)
Age: 79
End: 2019-10-01

## 2020-01-23 ENCOUNTER — TELEPHONE (OUTPATIENT)
Dept: VASCULAR SURGERY | Facility: CLINIC | Age: 80
End: 2020-01-23

## 2020-01-23 NOTE — TELEPHONE ENCOUNTER
Nurse Triage Call    Called pt back.    Situation: Pt c/o pain/discomfort behind her left knee, like a lump. Pt concerned about a blood clot. She has been wearing knee high compression hose about every day since seeing Dr. Velasquez in the past. She said its only really been hurting the last couple of days, but she has been sitting for long periods of time which she thinks this may make the area more sore.    Background: Pt last had a bilateral venous comp done 9/4/2018 and saw Dr. Velasquez.    Assessment: Pt denies significant leg swelling or redness.     Recommendation: Instructed pt to try Tylenol for the discomfort and possibly look into wearing thigh high or pantyhose compression so that it is covering that area. If the pain/discomfort continues for more than a couple days, to call us back and make a follow up appt with Dr. Velasquez. Informed pt she will probably need another venous ultrasound done as well. Per Dr. Velasquez's last office visit note from 9/4/2018, patient to return if symptoms worsen.    Patient is in agreement with plan and had no further questions.      Jenny Sol, RN

## 2020-01-23 NOTE — TELEPHONE ENCOUNTER
Pt. Called with throbbing pain in her left leg. She said no redness or hot to touch, she has been wearing knee high support hose.Please call her to advise/[phone]

## 2020-03-13 ENCOUNTER — TRANSFERRED RECORDS (OUTPATIENT)
Dept: HEALTH INFORMATION MANAGEMENT | Facility: CLINIC | Age: 80
End: 2020-03-13

## 2020-06-05 DIAGNOSIS — M81.0 OSTEOPOROSIS, UNSPECIFIED OSTEOPOROSIS TYPE, UNSPECIFIED PATHOLOGICAL FRACTURE PRESENCE: ICD-10-CM

## 2020-06-05 DIAGNOSIS — J30.2 SEASONAL ALLERGIC RHINITIS, UNSPECIFIED TRIGGER: ICD-10-CM

## 2020-06-05 NOTE — TELEPHONE ENCOUNTER
"Requested Prescriptions   Pending Prescriptions Disp Refills     fluticasone (FLONASE) 50 MCG/ACT nasal spray [Pharmacy Med Name: FLUTICASONE PROP 50MCG 50 URBAN]  Last Written Prescription Date:  6/3/19  Last Fill Quantity: 9.9mL,  # refills: 3   Last office visit: 6/3/2019 with prescribing provider:  Rd   Future Office Visit:   Next 5 appointments (look out 90 days)    Jul 17, 2020  8:40 AM CDT  PHYSICAL with [facility]ultana [facility] [address]  [facility] [address]  [phone]                16 g 3     Sig: INSTILL 2 SPRAYS INTO BOTH NOSTRILS DAILY       Nasal Allergy Protocol Failed - 6/5/2020  9:05 AM        Failed - Recent (12 mo) or future (30 days) visit within the authorizing provider's specialty     Patient has had an office visit with the authorizing provider or a provider within the authorizing providers department within the previous 12 mos or has a future within next 30 days. See \"Patient Info\" tab in inbasket, or \"Choose Columns\" in Meds & Orders section of the refill encounter.              Passed - Patient is age 12 or older        Passed - Medication is active on med list           IBANdronate (BONIVA) 150 MG tablet [Pharmacy Med Name: IBANDRONATE 150MG  TAB]  Last Written Prescription Date:  6/3/19  Last Fill Quantity: 3,  # refills: 3   Last office visit: 6/3/2019 with prescribing provider:  Rd   Future Office Visit:   Next 5 appointments (look out 90 days)    Jul 17, 2020  8:40 AM CDT  PHYSICAL with Monica Sultana MD  [facility] [address]  [facility] [id number] [phone]                3 tablet 3     Sig: TAKE 1 TABLET (150 MG) BY MOUTH EVERY 30 DAYS       Bisphosphonates Failed - 6/5/2020  9:05 AM        Failed - Recent (12 mo) or future (30 days) visit within the authorizing provider's specialty     Patient has had an " "office visit with the authorizing provider or a provider within the authorizing providers department within the previous 12 mos or has a future within next 30 days. See \"Patient Info\" tab in inbasket, or \"Choose Columns\" in Meds & Orders section of the refill encounter.              Failed - Normal serum creatinine on file within past 12 months     Recent Labs   Lab Test 06/03/19  0946  02/26/15  0610   CR 0.53   < >  --    CREAT  --   --  0.4*    < > = values in this interval not displayed.       Ok to refill medication if creatinine is low          Passed - Dexa on file within past 2 years     Please review last Dexa result.           Passed - Medication is active on med list        Passed - Patient is age 18 or older             "

## 2020-06-08 RX ORDER — FLUTICASONE PROPIONATE 50 MCG
SPRAY, SUSPENSION (ML) NASAL
Qty: 16 G | Refills: 3 | Status: SHIPPED | OUTPATIENT
Start: 2020-06-08 | End: 2024-04-11

## 2020-06-08 RX ORDER — IBANDRONATE SODIUM 150 MG/1
150 TABLET, FILM COATED ORAL
Qty: 3 TABLET | Refills: 3 | Status: SHIPPED | OUTPATIENT
Start: 2020-06-08 | End: 2021-07-19

## 2020-06-08 NOTE — TELEPHONE ENCOUNTER
Pending Prescriptions:                       Disp   Refills    fluticasone (FLONASE) 50 MCG/ACT nasal spr*16 g   3        Sig: INSTILL 2 SPRAYS INTO BOTH NOSTRILS DAILY    IBANdronate (BONIVA) 150 MG tablet [Pharma*3 tabl*3        Sig: Take 1 tablet (150 mg) by mouth every 30 days      Routing refill requests to provider for review/approval because:  Protocol failed.    Please advise, thanks.

## 2020-06-19 DIAGNOSIS — E78.2 MIXED HYPERLIPIDEMIA: ICD-10-CM

## 2020-06-22 RX ORDER — SIMVASTATIN 10 MG
TABLET ORAL
Qty: 90 TABLET | Refills: 0 | Status: SHIPPED | OUTPATIENT
Start: 2020-06-22 | End: 2020-07-17

## 2020-07-08 DIAGNOSIS — I10 ESSENTIAL HYPERTENSION: ICD-10-CM

## 2020-07-08 NOTE — LETTER
[place] [address]  [facility] [address]  Phone: [phone]        July 15, 2020      Sophia Olivarez                                                                                                                                [address]  [facility] [address]            Dear Ms. Olivarez, we have received a refill request from your pharmacy for your medication - Many medications require routine follow-up with your provider and a review of your chart indicates that you are due for follow up. We have sent a one time, 30 day refill to your pharmacy to allow you time to schedule an appointment.      Due to COVID-19, we are doing the majority of our visits virtually right now and we have a few options for virtual visits:     Video Visit:  Please schedule a video visit. Please call [phone] to schedule an appointment.      A video visit is a two-way, real-time, interactive video and audio appointment with your provider, using a secure vonnie called AW Touchpoint on your mobile device or a browser on your computer. Video visits will be billed the same as in-person visits, including deductibles and co-insurance.         Telephone Visit:  You can schedule a telephone visit. Please call [phone] to schedule an appointment.      A telephone visit is a two-way, real-time, audio scheduled appointment with your provider. Telephone visits are billed at different rates depending on your insurance coverage. During this emergency period, for some insurers, patients may be billed the same as an in-person visit. Please reach out to your insurance provider with any questions.         E-Visit:  You can visit [email or web address] to complete an e-visit. Click here to complete an eVisit.     An e-visit is a visit that happens unscheduled, using messages through your [employer] account and can replace certain clinic appointments or Urgent Care visits  depending on your symptoms. E-visits should only be used for non-urgent medical conditions, as it may take up to 1 business day to receive a response. Please visit [email or web address] for e-visit appropriate conditions and instructions on how to complete an e-visit.            Thank you,      Dr. Monica Sultana  [facility]

## 2020-07-09 ENCOUNTER — HOSPITAL ENCOUNTER (EMERGENCY)
Facility: CLINIC | Age: 80
Discharge: HOME | End: 2020-07-09
Attending: EMERGENCY MEDICINE | Admitting: EMERGENCY MEDICINE
Payer: COMMERCIAL

## 2020-07-09 ENCOUNTER — OFFICE VISIT (OUTPATIENT)
Dept: URGENT CARE | Facility: URGENT CARE | Age: 80
End: 2020-07-09
Payer: COMMERCIAL

## 2020-07-09 ENCOUNTER — APPOINTMENT (OUTPATIENT)
Dept: ULTRASOUND IMAGING | Facility: CLINIC | Age: 80
End: 2020-07-09
Attending: EMERGENCY MEDICINE
Payer: COMMERCIAL

## 2020-07-09 VITALS
TEMPERATURE: 97.4 F | RESPIRATION RATE: 14 BRPM | DIASTOLIC BLOOD PRESSURE: 101 MMHG | SYSTOLIC BLOOD PRESSURE: 170 MMHG | OXYGEN SATURATION: 99 % | HEART RATE: 84 BPM

## 2020-07-09 VITALS — TEMPERATURE: 97.7 F | HEART RATE: 71 BPM | SYSTOLIC BLOOD PRESSURE: 143 MMHG | DIASTOLIC BLOOD PRESSURE: 81 MMHG

## 2020-07-09 DIAGNOSIS — M79.661 PAIN OF RIGHT LOWER LEG: ICD-10-CM

## 2020-07-09 DIAGNOSIS — R22.41 LOCALIZED SWELLING OF RIGHT LOWER LEG: Primary | ICD-10-CM

## 2020-07-09 DIAGNOSIS — I10 ESSENTIAL HYPERTENSION: ICD-10-CM

## 2020-07-09 DIAGNOSIS — M79.661 RIGHT CALF PAIN: ICD-10-CM

## 2020-07-09 LAB
ANION GAP SERPL CALCULATED.3IONS-SCNC: 5 MMOL/L (ref 3–14)
BASOPHILS # BLD AUTO: 0 10E9/L (ref 0–0.2)
BASOPHILS NFR BLD AUTO: 0.4 %
BUN SERPL-MCNC: 16 MG/DL (ref 7–30)
CALCIUM SERPL-MCNC: 8.6 MG/DL (ref 8.5–10.1)
CHLORIDE SERPL-SCNC: 102 MMOL/L (ref 94–109)
CO2 SERPL-SCNC: 29 MMOL/L (ref 20–32)
CREAT SERPL-MCNC: 0.61 MG/DL (ref 0.52–1.04)
DIFFERENTIAL METHOD BLD: NORMAL
EOSINOPHIL # BLD AUTO: 0.1 10E9/L (ref 0–0.7)
EOSINOPHIL NFR BLD AUTO: 1.6 %
ERYTHROCYTE [DISTWIDTH] IN BLOOD BY AUTOMATED COUNT: 12.8 % (ref 10–15)
GFR SERPL CREATININE-BSD FRML MDRD: 87 ML/MIN/{1.73_M2}
GLUCOSE SERPL-MCNC: 104 MG/DL (ref 70–99)
HCT VFR BLD AUTO: 37.2 % (ref 35–47)
HGB BLD-MCNC: 12.1 G/DL (ref 11.7–15.7)
IMM GRANULOCYTES # BLD: 0 10E9/L (ref 0–0.4)
IMM GRANULOCYTES NFR BLD: 0.2 %
LYMPHOCYTES # BLD AUTO: 1.3 10E9/L (ref 0.8–5.3)
LYMPHOCYTES NFR BLD AUTO: 26 %
MCH RBC QN AUTO: 29.1 PG (ref 26.5–33)
MCHC RBC AUTO-ENTMCNC: 32.5 G/DL (ref 31.5–36.5)
MCV RBC AUTO: 89 FL (ref 78–100)
MONOCYTES # BLD AUTO: 0.4 10E9/L (ref 0–1.3)
MONOCYTES NFR BLD AUTO: 8.5 %
NEUTROPHILS # BLD AUTO: 3.3 10E9/L (ref 1.6–8.3)
NEUTROPHILS NFR BLD AUTO: 63.3 %
NRBC # BLD AUTO: 0 10*3/UL
NRBC BLD AUTO-RTO: 0 /100
PLATELET # BLD AUTO: 238 10E9/L (ref 150–450)
POTASSIUM SERPL-SCNC: 3.3 MMOL/L (ref 3.4–5.3)
RBC # BLD AUTO: 4.16 10E12/L (ref 3.8–5.2)
SODIUM SERPL-SCNC: 136 MMOL/L (ref 133–144)
WBC # BLD AUTO: 5.2 10E9/L (ref 4–11)

## 2020-07-09 PROCEDURE — 99284 EMERGENCY DEPT VISIT MOD MDM: CPT | Mod: 25

## 2020-07-09 PROCEDURE — 85025 COMPLETE CBC W/AUTO DIFF WBC: CPT | Performed by: EMERGENCY MEDICINE

## 2020-07-09 PROCEDURE — 80048 BASIC METABOLIC PNL TOTAL CA: CPT | Performed by: EMERGENCY MEDICINE

## 2020-07-09 PROCEDURE — 99215 OFFICE O/P EST HI 40 MIN: CPT | Performed by: PHYSICIAN ASSISTANT

## 2020-07-09 PROCEDURE — 36415 COLL VENOUS BLD VENIPUNCTURE: CPT | Performed by: EMERGENCY MEDICINE

## 2020-07-09 PROCEDURE — 93971 EXTREMITY STUDY: CPT | Mod: RT

## 2020-07-09 RX ORDER — HYDROCHLOROTHIAZIDE 25 MG/1
TABLET ORAL
Qty: 30 TABLET | Refills: 0 | Status: SHIPPED | OUTPATIENT
Start: 2020-07-09 | End: 2020-07-17

## 2020-07-09 ASSESSMENT — ENCOUNTER SYMPTOMS: SHORTNESS OF BREATH: 0

## 2020-07-09 NOTE — TELEPHONE ENCOUNTER
Medication faxed at this time 1 month, patient needs to be seen,   I have openings in the week of July 20, please schedule appointment in clinic

## 2020-07-09 NOTE — TELEPHONE ENCOUNTER
Routing refill request to provider for review/approval because:  Patient needs to be seen because:  Due for visit and for BP and BMP recheck  Consider phone/virtual/in person         no

## 2020-07-10 RX ORDER — HYDROCHLOROTHIAZIDE 25 MG/1
TABLET ORAL
Qty: 90 TABLET | Refills: 3 | OUTPATIENT
Start: 2020-07-10

## 2020-07-10 NOTE — PROGRESS NOTES
SUBJECTIVE:  Chief Complaint   Patient presents with     Urgent Care     swelling with pain of right leg ongoing, but worse in the last couple of days.     Sophia Olivarez is a 79 year old female presents with a chief complaint of right lower leg tenderness, swelling and tightness .  How: no known injury.  The patient complained of mild pain  and has not had decreased ROM.  Pain exacerbated by walking and movement.  Relieved by nothing.  She treated it initially with no therapy. This is the first time this type of injury has occurred to this patient.     Past Medical History:   Diagnosis Date     Benign neoplasm of skin, site unspecified     sees derm.     Calculus of kidney     3 passed spont.     Chronic subinvolution of uterus      Disorder of bone and cartilage, unspecified     osteopenia     Dyspepsia and other specified disorders of function of stomach     dyspepsia     Generalized osteoarthrosis, unspecified site      H/O thyroid nodule     f/u with endocrinologist     Hematuria     microscopic     HTN (hypertension)      Malignant neoplasm of corpus uteri, except isthmus (H)     surgery and radiation rx.     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      Nontoxic multinodular goiter     sees endocrine     Other and unspecified hyperlipidemia      Allergies   Allergen Reactions     Aspirin      Atorvastatin Calcium      lipitor, myalgia, zocor     Codeine Sulfate      Difficulty breathing     Epidural Tray      Vomiting and diarrhea     Magnesium Citrate Diarrhea     Vomiting       Meperidine Hcl      demerol GI     Questran [Cholestyramine] Nausea     Social History     Tobacco Use     Smoking status: Never Smoker     Smokeless tobacco: Never Used   Substance Use Topics     Alcohol use: No     Family History   Problem Relation Age of  Onset     Family History Negative Mother      Other Cancer Mother      Osteoporosis Mother      Family History Negative Father      Other Cancer Father      Neurologic Disorder Sister         MS     Diabetes Brother         also, colon CA, colon surgery     Diabetes Sister      Hypertension Sister      Hyperlipidemia Sister      Anxiety Disorder Sister      Mental Illness Sister         bi polar     Obesity Sister         from some pills too     Diabetes Brother      Colon Cancer Brother      Other Cancer Brother      Anxiety Disorder Brother      Mental Illness Brother         schitzophenia     Obesity Brother         from pills too       ROS:  CONSTITUTIONAL:NEGATIVE for fever, chills, change in weight  INTEGUMENTARY/SKIN: POSITIVE for right lower leg, calf swelling  ENT/MOUTH: NEGATIVE for ear, mouth and throat problems  RESP:NEGATIVE for significant cough or SOB  CV: NEGATIVE for chest pain, palpitations or peripheral edema  GI: NEGATIVE for nausea, abdominal pain, heartburn, or change in bowel habits  : negative for and dysuria  MUSCULOSKELETAL: POSITIVE  for calf tenderness, localized swelling  VASC: NEGATIVE for cool extremity  NEURO: NEGATIVE for weakness, dizziness or paresthesias    EXAM:   BP (!) 143/81   Pulse 71   Temp 97.7  F (36.5  C) (Oral)   Gen: healthy,alert,no distress  Extremity: leg has swelling and point tenderness of calf.   VASC:There is not compromise to the distal circulation.  Pulses are +2 and CRT is brisk  CHEST: clear to auscultation  CV: regular rate and rhythm  MS:  tender to palpation right calf  SKIN: Positive for swelling and tightness of right calf   NEURO: Normal strength and tone, sensory exam grossly normal, mentation intact and speech normal  LYMPHATICS: negative for lymphatic drainage    Leg ultrasound was unable to be ordered at the [facility] or [facility] ED, they are only taking orders from the ER tonight    ASSESSMENT/PLAN:    ICD-10-CM    1. Localized swelling of right  lower leg  R22.41 CANCELED: US Lower Extremity Venous Duplex Right   2. Right calf pain  M79.661 CANCELED: US Lower Extremity Venous Duplex Right       Patient send to the [facility] ED for leg ultrasound and treatment of DVT if +    :

## 2020-07-10 NOTE — ED TRIAGE NOTES
Right leg swelling.  Seen at clinic and sent for eval and US.      ABCs intact.  Alert and oriented x 3.

## 2020-07-10 NOTE — ED NOTES
"[place] clinic explained to the patient that \"she could come to the ER, get an US, and shots then home. Explained to patient we needed to place her in a gown for further workup. Patient upset for possible work up  " no dysuria, no frequency, and no hematuria.

## 2020-07-10 NOTE — TELEPHONE ENCOUNTER
Duplicate prescription. Dr. Sultana sent 30 day supply for patient on 7/9/2020 to last until upcoming appointment. Request denied.

## 2020-07-10 NOTE — ED PROVIDER NOTES
History     Chief Complaint:  Leg Pain    HPI   Sophia Olivarez is a 79 year old female with a history of uterine cancer who presents to the emergency department for evaluation of leg pain. The patient reports that she has recently been experiencing right leg swelling and pain. She was see earlier today at Urgent Care in [place] and the patient was sent to the emergency department for a ultrasound to rule out DVT. The patient also mentions that 4-5 weeks ago she dropped a brick on her right foot and she has been seen by podiatry. Multiple x-rays have been done and there is no break according to the patient. She denies any chest pain or shortness of breath.     Allergies:  Aspirin  Atorvastatin Calcium  Codeine Sulfate  Epidural Tray  Magnesium Citrate  Meperidine Hcl  Questran [Cholestyramine]    Medications:    Aspirin  Zyrtec  Lomotil  Flonase  Hydrodiuril  Boniva  Lisinopril   Imodium  Florastor  Zocor    Past Medical History:    Benign neoplasm of skin  Calculus of kidney  Chronic subinvolution of uterus  Disorder of bone and cartilage  Dyspepsia  Generalized osteoarthrosis  Thyroid nodule  Hematuria  Hypertension  Malignant neoplasm of corpus uteri  Myalgia  Goiter  Hyperlipidemia  Irritable bowel syndrome    Past Surgical History:    Hysterectomy  Biopsy of thyroid (multiple)  Cholecystectomy  Thyroidectomy  D & I x2  Cystoscopy  Colonoscopy    Family History:    Mother: cancer, osteoporosis  Father: cancer  Sister(s): multiple sclerosis, diabetes, hypertension, hyperlipidemia, anxiety, bipolar, obesity  Brother(s): colon cancer, diabetes, anxiety, schizophrenia, obesity    Social History:  The patient presents alone.  Smoking Status: Never  Smokeless Tobacco: Never  Alcohol Use: No  Drug Use: No  Marital Status:        Review of Systems   Respiratory: Negative for shortness of breath.    Cardiovascular: Positive for leg swelling. Negative for chest pain.   All other systems reviewed and are  negative.        Physical Exam   Vitals:  Patient Vitals for the past 24 hrs:   BP Temp Temp src Heart Rate Resp SpO2   07/09/20 1941 (!) 154/78 97.4  F (36.3  C) Oral 71 14 99 %       Physical Exam.    Gen: alert  HEENT: PERRL, oropharynx clear  Neck: normal ROM  CV: RRR, no murmurs  Pulm: breath sounds equal, lungs clear  Abd: Soft, nontender    MSK: RLE edema, calf tenderness, 2+ DP pulse  Skin: no rash to RLE, no redness RLE  Neuro: alert, appropriate conversation and interaction, normal strength and ROM hip, knee and ankle RLE      Emergency Department Course     Imaging:  Radiographic findings were communicated with the patient who voiced understanding of the findings.    US Lower Extremity Venous Duplex Right  1.  No deep venous thrombosis in the right lower extremity.  Reading per radiology.    Laboratory:  CBC: WNL. (WBC 5.2, HGB 12.1, )   BMP: Potassium 3.3 (L), Glucose 104 (H) o/w AWNL (Creatinine 0.61)    Emergency Department Course:  Past medical records, nursing notes, and vitals reviewed.    IV was inserted and blood was drawn for laboratory testing, results above.  The patient was sent for a lower extremity US while in the emergency department, results above.     2221 I performed an exam of the patient as documented above.     Findings and plan explained to the Patient. Patient discharged home with instructions regarding supportive care, medications, and reasons to return. The importance of close follow-up was reviewed.     I personally reviewed the laboratory and imaging results with the Patient and answered all related questions prior to discharge.      Impression & Plan      Medical Decision Making:  The patient presents with leg pain and swelling. She denies any trauma or injury. Differential considered included life threatening etiologies such as DVT, as well as muscle strain, baker's cyst, gout, arthritis and other musculoskeletal conditions are more likely. The ultrasound did not show a  DVT. We did discuss that if the pain continues, she develops any shortness of breath, or increased swelling or chest pain then return immediately to the UR/ED. Normally we recommend a three-5 day follow up for repeat ultrasound to ensure that there is no occult blood clot in the leg. We also discussed elevating the leg as much as possible, using compression stockings and ok to continue usual daily walks. She left with complete understanding and agreement with this plan and no other complaints.  She understands that if swelling continues, needs recheck US and further work up with PCP for cause of swelling.    Diagnosis:    ICD-10-CM    1. Pain of right lower leg  M79.661        Disposition:  discharged to home    Discharge Medications:  None      I, Estuardo Clancy, am serving as a scribe on 7/9/2020 at 8:40 PM to personally document services performed by Ursula Nye* based on my observations and the provider's statements to me.   Estuardo Clancy  7/9/2020   [facility] EMERGENCY DEPARTMENT       Ursula Nye MD  07/10/20 [id number]

## 2020-07-17 ENCOUNTER — OFFICE VISIT (OUTPATIENT)
Dept: INTERNAL MEDICINE | Facility: CLINIC | Age: 80
End: 2020-07-17
Payer: COMMERCIAL

## 2020-07-17 VITALS
HEART RATE: 72 BPM | SYSTOLIC BLOOD PRESSURE: 126 MMHG | RESPIRATION RATE: 13 BRPM | WEIGHT: 137 LBS | DIASTOLIC BLOOD PRESSURE: 82 MMHG | TEMPERATURE: 98.2 F | BODY MASS INDEX: 25.89 KG/M2 | OXYGEN SATURATION: 95 %

## 2020-07-17 DIAGNOSIS — R73.09 ELEVATED GLUCOSE: ICD-10-CM

## 2020-07-17 DIAGNOSIS — Z00.00 ROUTINE HISTORY AND PHYSICAL EXAMINATION OF ADULT: Primary | ICD-10-CM

## 2020-07-17 DIAGNOSIS — E78.2 MIXED HYPERLIPIDEMIA: ICD-10-CM

## 2020-07-17 DIAGNOSIS — E04.2 MULTIPLE THYROID NODULES: ICD-10-CM

## 2020-07-17 DIAGNOSIS — C55 MALIGNANT NEOPLASM OF UTERUS, UNSPECIFIED SITE (H): ICD-10-CM

## 2020-07-17 DIAGNOSIS — I10 ESSENTIAL HYPERTENSION: ICD-10-CM

## 2020-07-17 LAB
CANCER AG125 SERPL-ACNC: <5 U/ML (ref 0–30)
HBA1C MFR BLD: 5.4 % (ref 0–5.6)
HGB BLD-MCNC: 12 G/DL (ref 11.7–15.7)

## 2020-07-17 PROCEDURE — 99397 PER PM REEVAL EST PAT 65+ YR: CPT | Performed by: INTERNAL MEDICINE

## 2020-07-17 PROCEDURE — 85018 HEMOGLOBIN: CPT | Performed by: INTERNAL MEDICINE

## 2020-07-17 PROCEDURE — 84443 ASSAY THYROID STIM HORMONE: CPT | Performed by: INTERNAL MEDICINE

## 2020-07-17 PROCEDURE — 99213 OFFICE O/P EST LOW 20 MIN: CPT | Mod: 25 | Performed by: INTERNAL MEDICINE

## 2020-07-17 PROCEDURE — 80061 LIPID PANEL: CPT | Performed by: INTERNAL MEDICINE

## 2020-07-17 PROCEDURE — 80053 COMPREHEN METABOLIC PANEL: CPT | Performed by: INTERNAL MEDICINE

## 2020-07-17 PROCEDURE — 36415 COLL VENOUS BLD VENIPUNCTURE: CPT | Performed by: INTERNAL MEDICINE

## 2020-07-17 PROCEDURE — 86304 IMMUNOASSAY TUMOR CA 125: CPT | Performed by: INTERNAL MEDICINE

## 2020-07-17 PROCEDURE — 83036 HEMOGLOBIN GLYCOSYLATED A1C: CPT | Performed by: INTERNAL MEDICINE

## 2020-07-17 RX ORDER — SIMVASTATIN 10 MG
TABLET ORAL
Qty: 90 TABLET | Refills: 3 | Status: SHIPPED | OUTPATIENT
Start: 2020-07-17 | End: 2021-06-08

## 2020-07-17 RX ORDER — LISINOPRIL 5 MG/1
5 TABLET ORAL DAILY
Qty: 90 TABLET | Refills: 1 | Status: SHIPPED | OUTPATIENT
Start: 2020-07-17 | End: 2021-01-15

## 2020-07-17 RX ORDER — HYDROCHLOROTHIAZIDE 25 MG/1
TABLET ORAL
Qty: 90 TABLET | Refills: 1 | Status: SHIPPED | OUTPATIENT
Start: 2020-07-17 | End: 2021-02-25

## 2020-07-17 ASSESSMENT — ENCOUNTER SYMPTOMS
NERVOUS/ANXIOUS: 0
EYE PAIN: 0
PARESTHESIAS: 0
HEARTBURN: 0
FREQUENCY: 0
DIZZINESS: 0
DYSURIA: 0
SORE THROAT: 0
COUGH: 0
JOINT SWELLING: 0
PALPITATIONS: 0
CONSTIPATION: 0
SHORTNESS OF BREATH: 0
ABDOMINAL PAIN: 0
WEAKNESS: 0
MYALGIAS: 0
HEMATOCHEZIA: 0
ARTHRALGIAS: 0
DIARRHEA: 0
FEVER: 0
NAUSEA: 0
CHILLS: 0
BREAST MASS: 0

## 2020-07-17 ASSESSMENT — ACTIVITIES OF DAILY LIVING (ADL): CURRENT_FUNCTION: NO ASSISTANCE NEEDED

## 2020-07-17 NOTE — PROGRESS NOTES
"SUBJECTIVE:   Sophia Olivarez is a 79 year old female who presents for Preventive Visit.  Are you in the first 12 months of your Medicare coverage?  No    Healthy Habits:     In general, how would you rate your overall health?  Good    Frequency of exercise:  6-7 days/week    Duration of exercise:  Greater than 60 minutes    Do you usually eat at least 4 servings of fruit and vegetables a day, include whole grains    & fiber and avoid regularly eating high fat or \"junk\" foods?  Yes    Taking medications regularly:  Yes    Medication side effects:  Other    Ability to successfully perform activities of daily living:  No assistance needed    Home Safety:  No safety concerns identified    Hearing Impairment:  No hearing concerns    In the past 6 months, have you been bothered by leaking of urine?  No    In general, how would you rate your overall mental or emotional health?  Good      PHQ-2 Total Score: 0    Additional concerns today:  No    Do you feel safe in your environment? Yes    Have you ever done Advance Care Planning? (For example, a Health Directive, POLST, or a discussion with a medical provider or your loved ones about your wishes): Yes, advance care planning is on file.      Fall risk       Cognitive Screening   1) Repeat 3 items (Leader, Season, Table)    2) Clock draw: NORMAL  3) 3 item recall: Recalls 3 objects  Results: 3 items recalled: COGNITIVE IMPAIRMENT LESS LIKELY    Mini-CogTM Copyright S Ghislaine. Licensed by the author for use in [facility]; reprinted with permission (thomas@.[facility]). All rights reserved.      Do you have sleep apnea, excessive snoring or daytime drowsiness?: no    Reviewed and updated as needed this visit by clinical staff         Reviewed and updated as needed this visit by Provider          Past Medical History:   Diagnosis Date     Benign neoplasm of skin, site unspecified     sees derm.     Calculus of kidney     3 passed spont.     Chronic subinvolution of " uterus      Disorder of bone and cartilage, unspecified     osteopenia     Dyspepsia and other specified disorders of function of stomach     dyspepsia     Generalized osteoarthrosis, unspecified site      H/O thyroid nodule     f/u with endocrinologist     Hematuria     microscopic     HTN (hypertension)      Malignant neoplasm of corpus uteri, except isthmus (H)     surgery and radiation rx.     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      Nontoxic multinodular goiter     sees endocrine     Other and unspecified hyperlipidemia        Past Surgical History:   Procedure Laterality Date     ABDOMEN SURGERY  2005    uterine cancer Hysterectiomy     BIOPSY      many times from nodules from thyroid     C NONSPECIFIC PROCEDURE  1992    Cholecystectomy. abstracted 6/24/02.     C NONSPECIFIC PROCEDURE      Thyroidectomy. abstracted 6/24/02.     C NONSPECIFIC PROCEDURE      Dilatation & Curettage X 2. abstracted 6/24/02.     C NONSPECIFIC PROCEDURE      Cystoscopy. abstracted 6/24/02.     C NONSPECIFIC PROCEDURE      hysterctomy for uterine cancer.     CHOLECYSTECTOMY  1992     GYN SURGERY  2005    uterine cancer     HC COLONOSCOPY THRU STOMA, DIAGNOSTIC  10/2007    due q 5 yrs     HEAD & NECK SURGERY      right thyroid taken out       Current Outpatient Medications   Medication Sig Dispense Refill     Ascorbic Acid (VITAMIN C CR) 1000 MG TBCR        Cholecalciferol (VITAMIN D3 PO) Take 2,000 Units by mouth daily       Cyanocobalamin ([facility] VITAMIN B12) 2000 MCG TABS        diphenoxylate-atropine (LOMOTIL) 2.5-0.025 MG tablet TAKE 1 TABLET BY MOUTH FOUR TIMES DAILY AS NEEDED FOR DIARRHEA 90 tablet 3     fluticasone (FLONASE) 50 MCG/ACT nasal spray INSTILL 2 SPRAYS INTO BOTH NOSTRILS DAILY 16 g 3     hydrochlorothiazide (HYDRODIURIL) 25 MG tablet TAKE 1  TABLET (25 MG) BY MOUTH DAILY 90 tablet 1     IBANdronate (BONIVA) 150 MG tablet TAKE 1 TABLET (150 MG) BY MOUTH EVERY 30 DAYS 3 tablet 3     lisinopril (ZESTRIL) 5 MG tablet Take 1 tablet (5 mg) by mouth daily 90 tablet 1     loperamide (IMODIUM A-D) 2 MG tablet Take 2 mg by mouth as needed for diarrhea       Multiple Vitamins-Minerals (MULTIVITAMIN & MINERAL PO) Take 1 tablet by mouth daily       omega-3 fatty acids 1200 MG capsule Take 1 capsule by mouth daily 90 capsule      saccharomyces boulardii (FLORASTOR) 250 MG capsule Take 250 mg by mouth 2 times daily       simvastatin (ZOCOR) 10 MG tablet TAKE 1 TABLET BY MOUTH EVERY NIGHT AT BEDTIME 90 tablet 3     XIIDRA 5 % opthalmic solution INSTILL 1 DROP INTO EACH EYE EVERY 12 HOURS  12     aspirin 81 MG tablet Take by mouth as needed            Social History     Tobacco Use     Smoking status: Never Smoker     Smokeless tobacco: Never Used   Substance Use Topics     Alcohol use: No     If you drink alcohol do you typically have >3 drinks per day or >7 drinks per week? No    Alcohol Use 7/17/2020   Prescreen: >3 drinks/day or >7 drinks/week? Not Applicable   Prescreen: >3 drinks/day or >7 drinks/week? -   No flowsheet data found.        Hyperlipidemia Follow-Up      Are you regularly taking any medication or supplement to lower your cholesterol?   Yes- simvastatin    Are you having muscle aches or other side effects that you think could be caused by your cholesterol lowering medication?  No    Hypertension Follow-up      Do you check your blood pressure regularly outside of the clinic? Yes     Are you following a low salt diet? Yes    Are your blood pressures ever more than 140 on the top number (systolic) OR more   than 90 on the bottom number (diastolic), for example 140/90? No    Osteoporosis Follow up; On Boniva , tolerating well.       Current providers sharing in care for this patient include:   Patient Care Team:  Monica Sultana MD as PCP -  "General  Monica Sultana MD as Assigned PCP    The following health maintenance items are reviewed in Epic and correct as of today:  Health Maintenance   Topic Date Due     PHQ-2  01/01/2020     MEDICARE ANNUAL WELLNESS VISIT  06/03/2020     FALL RISK ASSESSMENT  06/03/2020     TSH W/FREE T4 REFLEX  07/08/2020     INFLUENZA VACCINE (1) 09/01/2020     ADVANCE CARE PLANNING  06/01/2023     LIPID  06/03/2024     DTAP/TDAP/TD IMMUNIZATION (4 - Td) 06/03/2029     DEXA  Completed     PNEUMOCOCCAL IMMUNIZATION 65+ LOW/MEDIUM RISK  Completed     ZOSTER IMMUNIZATION  Completed     IPV IMMUNIZATION  Aged Out     MENINGITIS IMMUNIZATION  Aged Out     HEPATITIS B IMMUNIZATION  Aged Out        Review of Systems   Constitutional: Negative for chills and fever.   HENT: Negative for congestion, ear pain, hearing loss and sore throat.    Eyes: Negative for pain and visual disturbance.   Respiratory: Negative for cough and shortness of breath.    Cardiovascular: Negative for chest pain and palpitations.   Gastrointestinal: Negative for abdominal pain, constipation, diarrhea, heartburn, hematochezia and nausea.   Breasts:  Negative for tenderness, breast mass and discharge.   Genitourinary: Negative for dysuria, frequency, genital sores, pelvic pain, urgency, vaginal bleeding and vaginal discharge.   Musculoskeletal: Negative for arthralgias, joint swelling and myalgias.   Skin: Negative for rash.   Neurological: Negative for dizziness, weakness and paresthesias.   Psychiatric/Behavioral: Negative for mood changes. The patient is not nervous/anxious.         OBJECTIVE:   /82   Pulse 72   Temp 98.2  F (36.8  C) (Oral)   Resp 13   Wt 62.1 kg (137 lb)   SpO2 95%   BMI 25.89 kg/m   Estimated body mass index is 25.89 kg/m  as calculated from the following:    Height as of 6/3/19: 1.549 m (5' 1\").    Weight as of this encounter: 62.1 kg (137 lb).  Physical Exam  GENERAL APPEARANCE: healthy, alert and no " "distress  EYES: Eyes grossly normal to inspection, PERRL and conjunctivae and sclerae normal  HENT: ear canals and TM's normal, nose and mouth without ulcers or lesions, oropharynx clear and oral mucous membranes moist  RESP: lungs clear to auscultation - no rales, rhonchi or wheezes  BREAST: normal without masses, tenderness or nipple discharge and no palpable axillary masses or adenopathy  CV: regular rate and rhythm,    ABDOMEN: soft, nontender,  and bowel sounds normal  MS: Trace LE edema, no calf tenderness bilaterally   NEURO: Normal strength and tone, sensory exam grossly normal, mentation intact and speech normal  PSYCH: mentation appears normal and affect normal/bright      ASSESSMENT / PLAN:     (Z00.00) Routine history and physical examination of adult  (primary encounter diagnosis)  Plan: Hemoglobin, Comprehensive metabolic panel,         Lipid panel reflex to direct LDL Fasting            (R73.09) Elevated glucose  Plan: Hemoglobin A1c            (C55) Malignant neoplasm of uterus, unspecified site (H)  Plan:             (E04.2) Multiple thyroid nodules  Plan: TSH with free T4 reflex            (I10) Essential hypertension  Comment:BP stable  Plan: hydrochlorothiazide (HYDRODIURIL) 25 MG tablet,        lisinopril (ZESTRIL) 5 MG tablet refilled.explained clearly about the medication,insructions and side effects.     Low potassium in ER recently ; check potassium            (E78.2) Mixed hyperlipidemia  Plan: simvastatin (ZOCOR) 10 MG tablet refilled.explained clearly about the medication,insructions and side effects.             COUNSELING:  Reviewed preventive health counseling, as reflected in patient instructions       Regular exercise       Healthy diet/nutrition    Estimated body mass index is 27.15 kg/m  as calculated from the following:    Height as of 6/3/19: 1.549 m (5' 1\").    Weight as of 6/3/19: 65.2 kg (143 lb 11.2 oz).         reports that she has never smoked. She has never used " smokeless tobacco.      Appropriate preventive services were discussed with this patient, including applicable screening as appropriate for cardiovascular disease, diabetes, osteopenia/osteoporosis, and glaucoma.  As appropriate for age/gender, discussed screening for colorectal cancer, prostate cancer, breast cancer, and cervical cancer. Checklist reviewing preventive services available has been given to the patient.    Reviewed patients plan of care and provided an AVS. The Basic Care Plan (routine screening as documented in Health Maintenance) for Sophia meets the Care Plan requirement. This Care Plan has been established and reviewed with the Patient.    Counseling Resources:  ATP IV Guidelines  Pooled Cohorts Equation Calculator  Breast Cancer Risk Calculator  FRAX Risk Assessment  ICSI Preventive Guidelines  Dietary Guidelines for Americans, 2010  USDA's MyPlate  ASA Prophylaxis  Lung CA Screening    Monica Sulatna MD  [facility]    Identified Health Risks:

## 2020-07-17 NOTE — NURSING NOTE
/82   Pulse 72   Temp 98.2  F (36.8  C) (Oral)   Resp 13   Wt 62.1 kg (137 lb)   SpO2 95%   BMI 25.89 kg/m    Patient in for Medicare Visit.  Dinorah Luis, ENDER

## 2020-07-18 LAB
ALBUMIN SERPL-MCNC: 3.5 G/DL (ref 3.4–5)
ALP SERPL-CCNC: 63 U/L (ref 40–150)
ALT SERPL W P-5'-P-CCNC: 33 U/L (ref 0–50)
ANION GAP SERPL CALCULATED.3IONS-SCNC: 8 MMOL/L (ref 3–14)
AST SERPL W P-5'-P-CCNC: 23 U/L (ref 0–45)
BILIRUB SERPL-MCNC: 0.8 MG/DL (ref 0.2–1.3)
BUN SERPL-MCNC: 13 MG/DL (ref 7–30)
CALCIUM SERPL-MCNC: 8.8 MG/DL (ref 8.5–10.1)
CHLORIDE SERPL-SCNC: 101 MMOL/L (ref 94–109)
CHOLEST SERPL-MCNC: 158 MG/DL
CO2 SERPL-SCNC: 27 MMOL/L (ref 20–32)
CREAT SERPL-MCNC: 0.57 MG/DL (ref 0.52–1.04)
GFR SERPL CREATININE-BSD FRML MDRD: 89 ML/MIN/{1.73_M2}
GLUCOSE SERPL-MCNC: 85 MG/DL (ref 70–99)
HDLC SERPL-MCNC: 71 MG/DL
LDLC SERPL CALC-MCNC: 72 MG/DL
NONHDLC SERPL-MCNC: 87 MG/DL
POTASSIUM SERPL-SCNC: 3.6 MMOL/L (ref 3.4–5.3)
PROT SERPL-MCNC: 7.4 G/DL (ref 6.8–8.8)
SODIUM SERPL-SCNC: 136 MMOL/L (ref 133–144)
TRIGL SERPL-MCNC: 75 MG/DL
TSH SERPL DL<=0.005 MIU/L-ACNC: 0.87 MU/L (ref 0.4–4)

## 2020-08-06 ENCOUNTER — APPOINTMENT (OUTPATIENT)
Dept: URBAN - METROPOLITAN AREA CLINIC 253 | Age: 80
Setting detail: DERMATOLOGY
End: 2020-08-07

## 2020-08-06 VITALS — HEIGHT: 61 IN | WEIGHT: 135 LBS | RESPIRATION RATE: 14 BRPM

## 2020-08-06 DIAGNOSIS — H61.03 CHONDRITIS OF EXTERNAL EAR: ICD-10-CM

## 2020-08-06 DIAGNOSIS — Z87.2 PERSONAL HISTORY OF DISEASES OF THE SKIN AND SUBCUTANEOUS TISSUE: ICD-10-CM

## 2020-08-06 DIAGNOSIS — L81.4 OTHER MELANIN HYPERPIGMENTATION: ICD-10-CM

## 2020-08-06 DIAGNOSIS — L30.4 ERYTHEMA INTERTRIGO: ICD-10-CM

## 2020-08-06 DIAGNOSIS — L82.1 OTHER SEBORRHEIC KERATOSIS: ICD-10-CM

## 2020-08-06 DIAGNOSIS — Z71.89 OTHER SPECIFIED COUNSELING: ICD-10-CM

## 2020-08-06 DIAGNOSIS — D22 MELANOCYTIC NEVI: ICD-10-CM

## 2020-08-06 DIAGNOSIS — D18.0 HEMANGIOMA: ICD-10-CM

## 2020-08-06 DIAGNOSIS — L82.0 INFLAMED SEBORRHEIC KERATOSIS: ICD-10-CM

## 2020-08-06 PROBLEM — D18.01 HEMANGIOMA OF SKIN AND SUBCUTANEOUS TISSUE: Status: ACTIVE | Noted: 2020-08-06

## 2020-08-06 PROBLEM — H61.031 CHONDRITIS OF RIGHT EXTERNAL EAR: Status: ACTIVE | Noted: 2020-08-06

## 2020-08-06 PROBLEM — D22.5 MELANOCYTIC NEVI OF TRUNK: Status: ACTIVE | Noted: 2020-08-06

## 2020-08-06 PROCEDURE — OTHER PRESCRIPTION: OTHER

## 2020-08-06 PROCEDURE — OTHER DEFER: OTHER

## 2020-08-06 PROCEDURE — OTHER ADDITIONAL NOTES: OTHER

## 2020-08-06 PROCEDURE — 99214 OFFICE O/P EST MOD 30 MIN: CPT

## 2020-08-06 PROCEDURE — OTHER COUNSELING: OTHER

## 2020-08-06 RX ORDER — NYSTATIN 100000 [USP'U]/G
POWDER TOPICAL BID
Qty: 1 | Refills: 2 | Status: ERX | COMMUNITY
Start: 2020-08-06

## 2020-08-06 ASSESSMENT — LOCATION DETAILED DESCRIPTION DERM
LOCATION DETAILED: LEFT MEDIAL BREAST 6-7:00 REGION
LOCATION DETAILED: SUPERIOR THORACIC SPINE
LOCATION DETAILED: RIGHT LATERAL BREAST 6-7:00 REGION
LOCATION DETAILED: RIGHT MEDIAL BREAST 5-6:00 REGION
LOCATION DETAILED: INFERIOR THORACIC SPINE
LOCATION DETAILED: LEFT SUPERIOR MEDIAL UPPER BACK
LOCATION DETAILED: RIGHT ANTERIOR LATERAL PROXIMAL THIGH
LOCATION DETAILED: RIGHT INFERIOR HELIX

## 2020-08-06 ASSESSMENT — LOCATION ZONE DERM
LOCATION ZONE: EAR
LOCATION ZONE: LEG
LOCATION ZONE: TRUNK

## 2020-08-06 ASSESSMENT — LOCATION SIMPLE DESCRIPTION DERM
LOCATION SIMPLE: RIGHT EAR
LOCATION SIMPLE: LEFT BREAST
LOCATION SIMPLE: LEFT UPPER BACK
LOCATION SIMPLE: RIGHT BREAST
LOCATION SIMPLE: UPPER BACK
LOCATION SIMPLE: RIGHT THIGH

## 2020-08-06 NOTE — PROCEDURE: ADDITIONAL NOTES
Detail Level: Detailed
Additional Notes: AR discussed she could prescribe a topical steroid to help with inflammation. Patient declined at this time

## 2020-09-15 ENCOUNTER — TELEPHONE (OUTPATIENT)
Dept: VASCULAR SURGERY | Facility: CLINIC | Age: 80
End: 2020-09-15

## 2020-09-15 ENCOUNTER — NURSE TRIAGE (OUTPATIENT)
Dept: INTERNAL MEDICINE | Facility: CLINIC | Age: 80
End: 2020-09-15

## 2020-09-15 DIAGNOSIS — M79.89 LEG SWELLING: Primary | ICD-10-CM

## 2020-09-15 NOTE — TELEPHONE ENCOUNTER
Pt called and left message.    Called pt back and instructed her to go through her PCP as to getting an ultrasound or further imaging done since her leg/foot swelling has not improved. Instructed pt that wearing her compression hose, icing the swollen area, and elevating as much as possible should help with the swelling.    Pt hasn't seen Dr. Velasquez since 2018 so we wouldn't need to see patient at this time.    Pt in agreement with plan and had no further questions.    Jenny Sol, AIRAMN, RN  [facility]  [employer]

## 2020-09-15 NOTE — TELEPHONE ENCOUNTER
Swelling in right foot is not improving since dropping a brick on her foot several months ago. Patient has been using compression stocking and taking a daily water pill. Call her back to advise, she is concerned about a blood clot. [phone]

## 2020-09-15 NOTE — TELEPHONE ENCOUNTER
Called patient for further triage.    Patient reports she dropped a brick on her right foot in June. Since then, patient has been experiencing swelling in her right foot and right leg. Patient reports she saw podiatry in June who advised there wasn't any broken bones. Patient also went into ER on 7/9/20 for the swelling, blood clot was ruled out at the time.    Patient concerned because the swelling seems to be getting worse. Patient reports swelling in her right foot that extends up the knee. Patient estimates that her right leg is almost twice as big as her left. Patient denies fever, chest pain, shortness of breath, or areas of warmth or redness in the leg. Patient reports the leg feels soft, and denies weeping fluid. Patient reports she is elevating her leg daily, wearing support hose daily, and taking hydrochlorothiazide as directed.     Patient reports she developed some pain in her right leg last night that kept her from sleeping which is new. Patient also noted numbness in the toes on her right foot that resolved after standing up. Patient states the pain or numbness is not present this morning.     Patient states when she saw podiatry, they advised the swelling could be present until Thanksgiving. Patient also states many years ago she had lymph nodes removed during a procedure for uterine cancer, and at the time she was advised she may have issues with leg swelling due to this.     Patient is concerned that the swelling is getting worse. Patient wondering if primary care provider would order US to rule out a blood clot? Or if she should see lymphedema?     Call patient back at [phone]    Additional Information    Negative: Sounds like a life-threatening emergency to the triager    Negative: Chest pain    Negative: Small area of swelling and followed an insect bite to the area    Negative: Followed a knee injury    Negative: Ankle or foot injury    Negative: Pregnant with leg swelling or edema     Negative: Difficulty breathing at rest    Negative: Entire foot is cool or blue in comparison to other side    Negative: SEVERE swelling (e.g., swelling extends above knee, entire leg is swollen, weeping fluid)    Negative: Thigh or calf pain and only 1 side and present > 1 hour    Thigh, calf, or ankle swelling in only one leg     Present for months, will route to PCP for next steps    Protocols used: LEG SWELLING AND EDEMA-A-OH

## 2020-12-03 ENCOUNTER — TELEPHONE (OUTPATIENT)
Dept: INTERNAL MEDICINE | Facility: CLINIC | Age: 80
End: 2020-12-03

## 2020-12-03 NOTE — TELEPHONE ENCOUNTER
Pt calling with c/o right sided abdominal pain, just below ribs that wraps around to back for past one month.  No nausea or vomiting.  Pain is worse when laying down on side and when she stands up from a sitting position.  It is a little tender to touch.  Pt states in the past she had an abdominal ultrasound and was told she may have the beginnings of a hernia, I do not see the results of this in her chart.  She is requesting an ultrasound.  Advised she should be seen to evaluate, pt aware you are out of office today.  No openings today or tomorrow, can you fit her in tomorrow or do a virtual visit?  Please advise.

## 2020-12-04 NOTE — TELEPHONE ENCOUNTER
No openings in the clinic, please advise virtual visit, pt has had h/o cholecystectomy already , please advise ER if symptoms worsen

## 2020-12-09 ENCOUNTER — TRANSFERRED RECORDS (OUTPATIENT)
Dept: HEALTH INFORMATION MANAGEMENT | Facility: CLINIC | Age: 80
End: 2020-12-09

## 2020-12-09 LAB
HBA1C MFR BLD: 5.7 % (ref 4–6)
TSH SERPL-ACNC: 0.97 UIU/ML (ref 0.3–5)

## 2021-01-15 ENCOUNTER — HEALTH MAINTENANCE LETTER (OUTPATIENT)
Age: 81
End: 2021-01-15

## 2021-01-15 DIAGNOSIS — I10 ESSENTIAL HYPERTENSION: ICD-10-CM

## 2021-01-15 RX ORDER — LISINOPRIL 5 MG/1
5 TABLET ORAL DAILY
Qty: 90 TABLET | Refills: 1 | Status: SHIPPED | OUTPATIENT
Start: 2021-01-15 | End: 2021-07-19

## 2021-01-15 NOTE — TELEPHONE ENCOUNTER
"Requested Prescriptions   Pending Prescriptions Disp Refills     lisinopril (ZESTRIL) 5 MG tablet  Last Written Prescription Date:  07/17/20  Last Fill Quantity: 90,  # refills: 1   Last office visit: 7/17/2020 with prescribing provider:  07/17/20   Future Office Visit:           90 tablet 1     Sig: Take 1 tablet (5 mg) by mouth daily       ACE Inhibitors (Including Combos) Protocol Passed - 1/15/2021 12:33 PM        Passed - Blood pressure under 140/90 in past 12 months     BP Readings from Last 3 Encounters:   07/17/20 126/82   07/09/20 (!) 170/101   07/09/20 (!) 143/81                 Passed - Recent (12 mo) or future (30 days) visit within the authorizing provider's specialty     Patient has had an office visit with the authorizing provider or a provider within the authorizing providers department within the previous 12 mos or has a future within next 30 days. See \"Patient Info\" tab in inbasket, or \"Choose Columns\" in Meds & Orders section of the refill encounter.              Passed - Medication is active on med list        Passed - Patient is age 18 or older        Passed - No active pregnancy on record        Passed - Normal serum creatinine on file in past 12 months     Recent Labs   Lab Test 07/17/20  0936 02/26/15  0610 02/26/15  0610   CR 0.57   < >  --    CREAT  --   --  0.4*    < > = values in this interval not displayed.       Ok to refill medication if creatinine is low          Passed - Normal serum potassium on file in past 12 months     Recent Labs   Lab Test 07/17/20  0936   POTASSIUM 3.6             Passed - No positive pregnancy test within past 12 months             "

## 2021-01-15 NOTE — TELEPHONE ENCOUNTER
Pending Prescriptions:                       Disp   Refills    lisinopril (ZESTRIL) 5 MG tablet          90 tab*1            Sig: Take 1 tablet (5 mg) by mouth daily    Prescription approved per [facility] Refill Protocol.

## 2021-02-24 DIAGNOSIS — I10 ESSENTIAL HYPERTENSION: ICD-10-CM

## 2021-02-25 RX ORDER — HYDROCHLOROTHIAZIDE 25 MG/1
TABLET ORAL
Qty: 90 TABLET | Refills: 1 | Status: SHIPPED | OUTPATIENT
Start: 2021-02-25 | End: 2021-07-19

## 2021-02-25 NOTE — TELEPHONE ENCOUNTER
Pending Prescriptions:                       Disp   Refills    hydrochlorothiazide (HYDRODIURIL) 25 MG t*90 tab*1            Sig: TAKE 1 TABLET (25 MG) BY MOUTH DAILY    Prescription approved per [facility] Refill Protocol.

## 2021-06-03 ASSESSMENT — ENCOUNTER SYMPTOMS
ABDOMINAL PAIN: 0
CHILLS: 0
HEADACHES: 0
EYE PAIN: 0
WEAKNESS: 0
PARESTHESIAS: 0
COUGH: 0
DIARRHEA: 0
HEMATURIA: 0
FREQUENCY: 0
HEARTBURN: 0
NAUSEA: 0
DIZZINESS: 0
BREAST MASS: 0
ARTHRALGIAS: 0
HEMATOCHEZIA: 0
NERVOUS/ANXIOUS: 0
SHORTNESS OF BREATH: 0
JOINT SWELLING: 0
MYALGIAS: 0
PALPITATIONS: 0
SORE THROAT: 0
DYSURIA: 0
FEVER: 0
CONSTIPATION: 0

## 2021-06-03 ASSESSMENT — ACTIVITIES OF DAILY LIVING (ADL): CURRENT_FUNCTION: NO ASSISTANCE NEEDED

## 2021-06-07 ENCOUNTER — APPOINTMENT (OUTPATIENT)
Dept: URBAN - METROPOLITAN AREA CLINIC 253 | Age: 81
Setting detail: DERMATOLOGY
End: 2021-06-09

## 2021-06-07 VITALS — WEIGHT: 135 LBS | RESPIRATION RATE: 15 BRPM | HEIGHT: 61 IN

## 2021-06-07 DIAGNOSIS — D22 MELANOCYTIC NEVI: ICD-10-CM

## 2021-06-07 DIAGNOSIS — L82.0 INFLAMED SEBORRHEIC KERATOSIS: ICD-10-CM

## 2021-06-07 DIAGNOSIS — L81.4 OTHER MELANIN HYPERPIGMENTATION: ICD-10-CM

## 2021-06-07 DIAGNOSIS — L64.8 OTHER ANDROGENIC ALOPECIA: ICD-10-CM

## 2021-06-07 DIAGNOSIS — L82.1 OTHER SEBORRHEIC KERATOSIS: ICD-10-CM

## 2021-06-07 DIAGNOSIS — D18.0 HEMANGIOMA: ICD-10-CM

## 2021-06-07 DIAGNOSIS — Z71.89 OTHER SPECIFIED COUNSELING: ICD-10-CM

## 2021-06-07 DIAGNOSIS — B35.1 TINEA UNGUIUM: ICD-10-CM

## 2021-06-07 DIAGNOSIS — H61.03 CHONDRITIS OF EXTERNAL EAR: ICD-10-CM

## 2021-06-07 PROBLEM — D22.5 MELANOCYTIC NEVI OF TRUNK: Status: ACTIVE | Noted: 2021-06-07

## 2021-06-07 PROBLEM — H61.031 CHONDRITIS OF RIGHT EXTERNAL EAR: Status: ACTIVE | Noted: 2021-06-07

## 2021-06-07 PROBLEM — D18.01 HEMANGIOMA OF SKIN AND SUBCUTANEOUS TISSUE: Status: ACTIVE | Noted: 2021-06-07

## 2021-06-07 PROCEDURE — OTHER ADDITIONAL NOTES: OTHER

## 2021-06-07 PROCEDURE — OTHER COUNSELING: OTHER

## 2021-06-07 PROCEDURE — 99213 OFFICE O/P EST LOW 20 MIN: CPT | Mod: 25

## 2021-06-07 PROCEDURE — 17110 DESTRUCT B9 LESION 1-14: CPT

## 2021-06-07 PROCEDURE — OTHER LIQUID NITROGEN: OTHER

## 2021-06-07 ASSESSMENT — LOCATION SIMPLE DESCRIPTION DERM
LOCATION SIMPLE: LEFT GREAT TOE
LOCATION SIMPLE: UPPER BACK
LOCATION SIMPLE: LEFT SCALP
LOCATION SIMPLE: RIGHT GREAT TOE
LOCATION SIMPLE: RIGHT EAR
LOCATION SIMPLE: LEFT UPPER BACK

## 2021-06-07 ASSESSMENT — LOCATION DETAILED DESCRIPTION DERM
LOCATION DETAILED: LEFT GREAT TOENAIL
LOCATION DETAILED: INFERIOR THORACIC SPINE
LOCATION DETAILED: LEFT SUPERIOR MEDIAL UPPER BACK
LOCATION DETAILED: RIGHT GREAT TOENAIL
LOCATION DETAILED: RIGHT SUPERIOR HELIX
LOCATION DETAILED: LEFT MEDIAL FRONTAL SCALP

## 2021-06-07 ASSESSMENT — LOCATION ZONE DERM
LOCATION ZONE: EAR
LOCATION ZONE: TOENAIL
LOCATION ZONE: TRUNK
LOCATION ZONE: SCALP

## 2021-06-07 NOTE — PROCEDURE: ADDITIONAL NOTES
Render Risk Assessment In Note?: no
Detail Level: Detailed
Additional Notes: She is seeing her primary care doctor tomorrow. Recommended she have her ferritin, iron, and thyroid checked.

## 2021-06-07 NOTE — PROCEDURE: LIQUID NITROGEN
Medical Necessity Clause: This procedure was medically necessary because the lesions that were treated were:
Include Z78.9 (Other Specified Conditions Influencing Health Status) As An Associated Diagnosis?: No
Duration Of Freeze Thaw-Cycle (Seconds): 3
Detail Level: Detailed
Medical Necessity Information: It is in your best interest to select a reason for this procedure from the list below. All of these items fulfill various CMS LCD requirements except the new and changing color options.
Post-Care Instructions: I reviewed with the patient in detail post-care instructions. Patient is to wear sunprotection, and avoid picking at any of the treated lesions. Pt may apply Vaseline to crusted or scabbing areas.
Render Post-Care Instructions In Note?: yes
Number Of Freeze-Thaw Cycles: 2 freeze-thaw cycles
Consent: The patient's consent was obtained including but not limited to risks of crusting, scabbing, blistering, scarring, darker or lighter pigmentary change, recurrence, incomplete removal and infection.

## 2021-06-07 NOTE — HPI: HAIR LOSS
How Did The Hair Loss Occur?: gradual in onset
How Severe Is Your Hair Loss?: moderate
Additional History: She has an appointment with her primary care provider tomorrow to have labwork completed.

## 2021-06-08 ENCOUNTER — OFFICE VISIT (OUTPATIENT)
Dept: INTERNAL MEDICINE | Facility: CLINIC | Age: 81
End: 2021-06-08
Payer: COMMERCIAL

## 2021-06-08 VITALS
HEIGHT: 61 IN | RESPIRATION RATE: 13 BRPM | SYSTOLIC BLOOD PRESSURE: 134 MMHG | WEIGHT: 139.4 LBS | TEMPERATURE: 98.7 F | DIASTOLIC BLOOD PRESSURE: 82 MMHG | HEART RATE: 96 BPM | OXYGEN SATURATION: 97 % | BODY MASS INDEX: 26.32 KG/M2

## 2021-06-08 DIAGNOSIS — C55 MALIGNANT NEOPLASM OF UTERUS, UNSPECIFIED SITE (H): ICD-10-CM

## 2021-06-08 DIAGNOSIS — R60.9 EDEMA, UNSPECIFIED TYPE: ICD-10-CM

## 2021-06-08 DIAGNOSIS — E78.2 MIXED HYPERLIPIDEMIA: ICD-10-CM

## 2021-06-08 DIAGNOSIS — H61.23 BILATERAL IMPACTED CERUMEN: ICD-10-CM

## 2021-06-08 DIAGNOSIS — I10 ESSENTIAL HYPERTENSION: Primary | ICD-10-CM

## 2021-06-08 DIAGNOSIS — E04.2 MULTIPLE THYROID NODULES: ICD-10-CM

## 2021-06-08 PROCEDURE — 36415 COLL VENOUS BLD VENIPUNCTURE: CPT | Performed by: INTERNAL MEDICINE

## 2021-06-08 PROCEDURE — 80053 COMPREHEN METABOLIC PANEL: CPT | Performed by: INTERNAL MEDICINE

## 2021-06-08 PROCEDURE — 99214 OFFICE O/P EST MOD 30 MIN: CPT | Mod: 25 | Performed by: INTERNAL MEDICINE

## 2021-06-08 PROCEDURE — 69210 REMOVE IMPACTED EAR WAX UNI: CPT | Mod: 50 | Performed by: INTERNAL MEDICINE

## 2021-06-08 ASSESSMENT — MIFFLIN-ST. JEOR: SCORE: 1049.69

## 2021-06-08 NOTE — NURSING NOTE
"/82   Pulse 96   Temp 98.7  F (37.1  C) (Oral)   Resp 13   Ht 1.549 m (5' 1\")   Wt 63.2 kg (139 lb 6.4 oz)   SpO2 97%   BMI 26.34 kg/m    Patient in for Chronic Bilateral leg edema.  Bilateral ear was successful and patient tolerated well.  Dinorah Luis CMA    "

## 2021-06-08 NOTE — PROGRESS NOTES
"    Assessment & Plan     (I10) Essential hypertension    Comment: Blood pressure stable  Plan: On low-dose lisinopril 5 mg daily, explained clearly about the medication,insructions and side effects.  check comprehensive metabolic panel       (E78.2) Mixed hyperlipidemia  Plan: Patient has stopped taking simvastatin since 1 week a due to hair loss and probable muscle aches which are resolved after stopping simvastatin.  Will check lipid panel next month with her annual wellness visit    (E04.2) Multiple thyroid nodules  Plan: Follows up with endocrinologist    (C55) Malignant neoplasm of uterus, unspecified site (H)  Plan: S/p hysterectomy bilateral salpingo-oophorectomy and pelvic node    (H61.23) Bilateral impacted cerumen  Plan: Using curette part of the cerumen removed and rest bilateral ear irrigation done, bilateral ear canals and TM cleat after ear irrigation, patient tolerated procedure well.  REMOVE IMPACTED CERUMEN          (R60.9) Edema, unspecified type  Plan: Patient was advised to increase hydrochlorothiazide to 1 in a.m. and half at noon, advised to elevate lower extremity, advised to follow low-sodium diet.  Will reevaluate in 1 month during her wellness visit      Review of the result(s) of each unique test - CMP  Prescription drug management     BMI:   Estimated body mass index is 26.34 kg/m  as calculated from the following:    Height as of this encounter: 1.549 m (5' 1\").    Weight as of this encounter: 63.2 kg (139 lb 6.4 oz).       Return in about 6 weeks (around 7/19/2021) for Physical Exam.    Monica Sultana MD  [facility] Kingston is a 80 year old who presents for the following health issues        Hyperlipidemia Follow-Up      Are you regularly taking any medication or supplement to lower your cholesterol?   No  stopped simvastatin since 1 week, due to hair loss     Are you having muscle aches or other side effects that you think could be " caused by your cholesterol lowering medication?  No    Hypertension Follow-up      Do you check your blood pressure regularly outside of the clinic? Yes     Are you following a low salt diet? Yes    Are your blood pressures ever more than 140 on the top number (systolic) OR more   than 90 on the bottom number (diastolic), for example 140/90? No    Thyroid nodules: Follows up with endocrinologist    Patient also complains of a chronic bilateral leg edema more on Rt leg, patient has had a right lower extremity ultrasound which was negative for DVT has history of fall endometrial cancer s/p hysterectomy and lymph node dissection., currently taking hydrochlorothiazide 25 mg daily and also wears on and off compression socks  , no pain , edema is better in the morning when she wakes up.    Patient also complains of plugging in bilateral ears and requesting ear wash          Past Medical History:   Diagnosis Date     Benign neoplasm of skin, site unspecified     sees derm.     Calculus of kidney     3 passed spont.     Chronic subinvolution of uterus      Disorder of bone and cartilage, unspecified     osteopenia     Dyspepsia and other specified disorders of function of stomach     dyspepsia     Generalized osteoarthrosis, unspecified site      H/O thyroid nodule     f/u with endocrinologist     Hematuria     microscopic     HTN (hypertension)      Malignant neoplasm of corpus uteri, except isthmus (H)     surgery and radiation rx.     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      Nontoxic multinodular goiter     sees endocrine     Other and unspecified hyperlipidemia        Current Outpatient Medications   Medication Sig Dispense Refill     Ascorbic Acid (VITAMIN C CR) 1000 MG TBCR        Cholecalciferol (VITAMIN D3 PO) Take 2,000 Units by mouth daily  "      Cyanocobalamin ([employer] VITAMIN B12) 2000 MCG TABS        fluticasone (FLONASE) 50 MCG/ACT nasal spray INSTILL 2 SPRAYS INTO BOTH NOSTRILS DAILY 16 g 3     hydrochlorothiazide (HYDRODIURIL) 25 MG tablet TAKE 1 TABLET (25 MG) BY MOUTH DAILY 90 tablet 1     IBANdronate (BONIVA) 150 MG tablet TAKE 1 TABLET (150 MG) BY MOUTH EVERY 30 DAYS 3 tablet 3     lisinopril (ZESTRIL) 5 MG tablet Take 1 tablet (5 mg) by mouth daily 90 tablet 1     loperamide (IMODIUM A-D) 2 MG tablet Take 2 mg by mouth as needed for diarrhea       Multiple Vitamins-Minerals (MULTIVITAMIN & MINERAL PO) Take 1 tablet by mouth daily       omega-3 fatty acids 1200 MG capsule Take 1 capsule by mouth daily 90 capsule      saccharomyces boulardii (FLORASTOR) 250 MG capsule Take 250 mg by mouth 2 times daily           Review of Systems   CONSTITUTIONAL: NEGATIVE for fever, chills, change in weight  EYES: NEGATIVE for vision changes or irritation  ENT/MOUTH: NEGATIVE for ear, mouth and throat problems  RESP: NEGATIVE for significant cough or SOB  CV: NEGATIVE for chest pain, palpitations or peripheral edema  MUSCULOSKELETAL: Lower extremity edema  NEURO: NEGATIVE for weakness, dizziness or paresthesias  ENDOCRINE: History of thyroid nodules  PSYCHIATRIC: NEGATIVE for changes in mood or affect      Objective    /82   Pulse 96   Temp 98.7  F (37.1  C) (Oral)   Resp 13   Ht 1.549 m (5' 1\")   Wt 63.2 kg (139 lb 6.4 oz)   SpO2 97%   BMI 26.34 kg/m    Body mass index is 26.34 kg/m .  Physical Exam   GENERAL: healthy, alert and no distress  EYES: Eyes grossly normal to inspection, PERRL and conjunctivae and sclerae normal  HENT: Bilateral ear canals filled with cerumen   RESP: lungs clear to auscultation - no rales, rhonchi or wheezes  CV: regular rate and rhythm,    ABDOMEN: soft, nontender, no hepatosplenomegaly, no masses and bowel sounds normal  MS: 1+  lower extremity  edema noted, more on her right lower extremity, no redness or warmth in the " calf,  no calf tenderness bilateral lower extremity  NEURO: Normal strength and tone, mentation intact and speech normal  PSYCH: mentation appears normal, affect normal/bright

## 2021-06-09 LAB
ALBUMIN SERPL-MCNC: 3.6 G/DL (ref 3.4–5)
ALP SERPL-CCNC: 71 U/L (ref 40–150)
ALT SERPL W P-5'-P-CCNC: 27 U/L (ref 0–50)
ANION GAP SERPL CALCULATED.3IONS-SCNC: 3 MMOL/L (ref 3–14)
AST SERPL W P-5'-P-CCNC: 19 U/L (ref 0–45)
BILIRUB SERPL-MCNC: 0.5 MG/DL (ref 0.2–1.3)
BUN SERPL-MCNC: 7 MG/DL (ref 7–30)
CALCIUM SERPL-MCNC: 9 MG/DL (ref 8.5–10.1)
CHLORIDE SERPL-SCNC: 104 MMOL/L (ref 94–109)
CO2 SERPL-SCNC: 32 MMOL/L (ref 20–32)
CREAT SERPL-MCNC: 0.62 MG/DL (ref 0.52–1.04)
GFR SERPL CREATININE-BSD FRML MDRD: 86 ML/MIN/{1.73_M2}
GLUCOSE SERPL-MCNC: 78 MG/DL (ref 70–99)
POTASSIUM SERPL-SCNC: 3.4 MMOL/L (ref 3.4–5.3)
PROT SERPL-MCNC: 7.4 G/DL (ref 6.8–8.8)
SODIUM SERPL-SCNC: 139 MMOL/L (ref 133–144)

## 2021-07-19 ENCOUNTER — OFFICE VISIT (OUTPATIENT)
Dept: INTERNAL MEDICINE | Facility: CLINIC | Age: 81
End: 2021-07-19
Payer: COMMERCIAL

## 2021-07-19 VITALS
DIASTOLIC BLOOD PRESSURE: 77 MMHG | BODY MASS INDEX: 26.43 KG/M2 | RESPIRATION RATE: 18 BRPM | HEART RATE: 76 BPM | TEMPERATURE: 97.9 F | SYSTOLIC BLOOD PRESSURE: 130 MMHG | OXYGEN SATURATION: 96 % | WEIGHT: 140 LBS | HEIGHT: 61 IN

## 2021-07-19 DIAGNOSIS — M81.0 OSTEOPOROSIS, UNSPECIFIED OSTEOPOROSIS TYPE, UNSPECIFIED PATHOLOGICAL FRACTURE PRESENCE: ICD-10-CM

## 2021-07-19 DIAGNOSIS — R60.9 EDEMA, UNSPECIFIED TYPE: ICD-10-CM

## 2021-07-19 DIAGNOSIS — I10 ESSENTIAL HYPERTENSION: Primary | ICD-10-CM

## 2021-07-19 DIAGNOSIS — I89.0 LYMPHEDEMA: ICD-10-CM

## 2021-07-19 DIAGNOSIS — Z00.00 ENCOUNTER FOR MEDICARE ANNUAL WELLNESS EXAM: ICD-10-CM

## 2021-07-19 DIAGNOSIS — E78.2 MIXED HYPERLIPIDEMIA: ICD-10-CM

## 2021-07-19 DIAGNOSIS — R73.09 ELEVATED GLUCOSE: ICD-10-CM

## 2021-07-19 LAB
ALT SERPL W P-5'-P-CCNC: 31 U/L (ref 0–50)
CHOLEST SERPL-MCNC: 240 MG/DL
FASTING STATUS PATIENT QL REPORTED: YES
HBA1C MFR BLD: 5.5 % (ref 0–5.6)
HDLC SERPL-MCNC: 76 MG/DL
HGB BLD-MCNC: 12.3 G/DL (ref 11.7–15.7)
LDLC SERPL CALC-MCNC: 144 MG/DL
NONHDLC SERPL-MCNC: 164 MG/DL
TRIGL SERPL-MCNC: 100 MG/DL

## 2021-07-19 PROCEDURE — 84460 ALANINE AMINO (ALT) (SGPT): CPT | Performed by: INTERNAL MEDICINE

## 2021-07-19 PROCEDURE — 83036 HEMOGLOBIN GLYCOSYLATED A1C: CPT | Performed by: INTERNAL MEDICINE

## 2021-07-19 PROCEDURE — 80061 LIPID PANEL: CPT | Performed by: INTERNAL MEDICINE

## 2021-07-19 PROCEDURE — 85018 HEMOGLOBIN: CPT | Performed by: INTERNAL MEDICINE

## 2021-07-19 PROCEDURE — 99213 OFFICE O/P EST LOW 20 MIN: CPT | Mod: 25 | Performed by: INTERNAL MEDICINE

## 2021-07-19 PROCEDURE — 36415 COLL VENOUS BLD VENIPUNCTURE: CPT | Performed by: INTERNAL MEDICINE

## 2021-07-19 PROCEDURE — 99397 PER PM REEVAL EST PAT 65+ YR: CPT | Performed by: INTERNAL MEDICINE

## 2021-07-19 RX ORDER — HYDROCHLOROTHIAZIDE 25 MG/1
TABLET ORAL
Qty: 90 TABLET | Refills: 1 | Status: CANCELLED | OUTPATIENT
Start: 2021-07-19

## 2021-07-19 RX ORDER — IBANDRONATE SODIUM 150 MG/1
150 TABLET, FILM COATED ORAL
Qty: 3 TABLET | Refills: 3 | Status: SHIPPED | OUTPATIENT
Start: 2021-07-19 | End: 2022-07-20

## 2021-07-19 RX ORDER — HYDROCHLOROTHIAZIDE 25 MG/1
TABLET ORAL
Qty: 90 TABLET | Refills: 1 | Status: SHIPPED | OUTPATIENT
Start: 2021-07-19 | End: 2022-01-02

## 2021-07-19 RX ORDER — LISINOPRIL 5 MG/1
5 TABLET ORAL DAILY
Qty: 90 TABLET | Refills: 1 | Status: CANCELLED | OUTPATIENT
Start: 2021-07-19

## 2021-07-19 RX ORDER — LISINOPRIL 5 MG/1
5 TABLET ORAL DAILY
Qty: 90 TABLET | Refills: 1 | Status: SHIPPED | OUTPATIENT
Start: 2021-07-19 | End: 2022-01-02

## 2021-07-19 ASSESSMENT — ENCOUNTER SYMPTOMS
HEARTBURN: 0
FEVER: 0
WEAKNESS: 0
DYSURIA: 0
HEADACHES: 0
DIZZINESS: 0
SHORTNESS OF BREATH: 0
PALPITATIONS: 0
JOINT SWELLING: 0
FREQUENCY: 0
NAUSEA: 0
PARESTHESIAS: 0
ARTHRALGIAS: 0
HEMATOCHEZIA: 0
NERVOUS/ANXIOUS: 0
ABDOMINAL PAIN: 0
MYALGIAS: 0
CONSTIPATION: 0
CHILLS: 0
BREAST MASS: 0
COUGH: 0
HEMATURIA: 0
DIARRHEA: 1
SORE THROAT: 0
EYE PAIN: 0

## 2021-07-19 ASSESSMENT — MIFFLIN-ST. JEOR: SCORE: 1052.42

## 2021-07-19 ASSESSMENT — ACTIVITIES OF DAILY LIVING (ADL): CURRENT_FUNCTION: NO ASSISTANCE NEEDED

## 2021-07-19 NOTE — PROGRESS NOTES
"SUBJECTIVE:   Sophia Olivarez is a 80 year old female who presents for Preventive Visit.      Patient has been advised of split billing requirements and indicates understanding: Yes  Are you in the first 12 months of your Medicare coverage?  No    Healthy Habits:     In general, how would you rate your overall health?  Good    Frequency of exercise:  6-7 days/week    Duration of exercise:  45-60 minutes    Do you usually eat at least 4 servings of fruit and vegetables a day, include whole grains    & fiber and avoid regularly eating high fat or \"junk\" foods?  Yes    Taking medications regularly:  Yes    Medication side effects:  Not applicable    Ability to successfully perform activities of daily living:  No assistance needed    Home Safety:  No safety concerns identified    Hearing Impairment:  No hearing concerns    In the past 6 months, have you been bothered by leaking of urine?  No    In general, how would you rate your overall mental or emotional health?  Good      PHQ-2 Total Score: 0    Additional concerns today:  No    Do you feel safe in your environment? Yes    Have you ever done Advance Care Planning? (For example, a Health Directive, POLST, or a discussion with a medical provider or your loved ones about your wishes): Yes, advance care planning is on file.       Fall risk  Fallen 2 or more times in the past year?: No  Any fall with injury in the past year?: No    Cognitive Screening   1) Repeat 3 items (Leader, Season, Table)    2) Clock draw: NORMAL  3) 3 item recall: Recalls 3 objects  Results: 3 items recalled: COGNITIVE IMPAIRMENT LESS LIKELY    Mini-CogTM Copyright ALEC Scanlon. Licensed by the author for use in [facility]; reprinted with permission ([email or web address]). All rights reserved.      Do you have sleep apnea, excessive snoring or daytime drowsiness?: no    Reviewed and updated as needed this visit by clinical staff  Tobacco  Allergies  Meds   Med Hx  Surg Hx  Fam Hx  Soc Hx "        Reviewed and updated as needed this visit by Provider                  Past Medical History:   Diagnosis Date     Benign neoplasm of skin, site unspecified     sees derm.     Calculus of kidney     3 passed spont.     Chronic subinvolution of uterus      Disorder of bone and cartilage, unspecified     osteopenia     Dyspepsia and other specified disorders of function of stomach     dyspepsia     Generalized osteoarthrosis, unspecified site      H/O thyroid nodule     f/u with endocrinologist     Hematuria     microscopic     HTN (hypertension)      Malignant neoplasm of corpus uteri, except isthmus (H)     surgery and radiation rx.stage IC, grade 3 endometrial carcinoma.     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      Nontoxic multinodular goiter     sees endocrine     Other and unspecified hyperlipidemia        Past Surgical History:   Procedure Laterality Date     ABDOMEN SURGERY  2005    uterine cancer Hysterectiomy     BIOPSY      many times from nodules from thyroid     CHOLECYSTECTOMY  1992     GYN SURGERY  2005    uterine cancer     HEAD & NECK SURGERY      right thyroid taken out     [facility] NONSPECIFIC PROCEDURE  1992    Cholecystectomy. abstracted 6/24/02.     [facility] NONSPECIFIC PROCEDURE      Thyroidectomy. abstracted 6/24/02.     [facility] NONSPECIFIC PROCEDURE      Dilatation & Curettage X 2. abstracted 6/24/02.     [facility] NONSPECIFIC PROCEDURE      Cystoscopy. abstracted 6/24/02.     [facility] NONSPECIFIC PROCEDURE      hysterctomy for uterine cancer.     [facility] COLONOSCOPY THRU STOMA, DIAGNOSTIC  10/2007    due q 5 yrs       Current Outpatient Medications   Medication Sig Dispense Refill     Ascorbic Acid (VITAMIN C CR) 1000 MG TBCR        Cholecalciferol (VITAMIN D3 PO) Take 2,000 Units by mouth daily       Cyanocobalamin ([employer]S VITAMIN B12) 2000  MCG TABS        fluticasone (FLONASE) 50 MCG/ACT nasal spray INSTILL 2 SPRAYS INTO BOTH NOSTRILS DAILY 16 g 3     hydrochlorothiazide (HYDRODIURIL) 25 MG tablet TAKE 1 TABLET (25 MG) BY MOUTH DAILY 90 tablet 1     IBANdronate (BONIVA) 150 MG tablet TAKE 1 TABLET (150 MG) BY MOUTH EVERY 30 DAYS 3 tablet 3     lisinopril (ZESTRIL) 5 MG tablet Take 1 tablet (5 mg) by mouth daily 90 tablet 1     loperamide (IMODIUM A-D) 2 MG tablet Take 2 mg by mouth as needed for diarrhea       Multiple Vitamins-Minerals (MULTIVITAMIN & MINERAL PO) Take 1 tablet by mouth daily       omega-3 fatty acids 1200 MG capsule Take 1 capsule by mouth daily 90 capsule      saccharomyces boulardii (FLORASTOR) 250 MG capsule Take 250 mg by mouth 2 times daily         Family History   Problem Relation Age of Onset     Family History Negative Mother      Other Cancer Mother      Osteoporosis Mother      Family History Negative Father      Other Cancer Father      Neurologic Disorder Sister         MS     Diabetes Brother         also, colon CA, colon surgery     Diabetes Sister      Hypertension Sister      Hyperlipidemia Sister      Anxiety Disorder Sister      Mental Illness Sister         bi polar     Obesity Sister         from some pills too     Diabetes Brother      Colon Cancer Brother      Other Cancer Brother      Anxiety Disorder Brother      Mental Illness Brother         schitzophenia     Obesity Brother         from pills too       Social History     Tobacco Use     Smoking status: Never Smoker     Smokeless tobacco: Never Used   Substance Use Topics     Alcohol use: No         Alcohol Use 7/19/2021   Prescreen: >3 drinks/day or >7 drinks/week? Not Applicable   Prescreen: >3 drinks/day or >7 drinks/week? -        Hypertension Follow-up      Do you check your blood pressure regularly outside of the clinic? Yes     Are you following a low salt diet? Yes    Are your blood pressures ever more than 140 on the top number (systolic) OR  more   than 90 on the bottom number (diastolic), for example 140/90? No    Hyperlipidemia Follow-Up      Are you regularly taking any medication or supplement to lower your cholesterol?   No  Stopped simvastatin 3 wks ago.     Are you having muscle aches or other side effects that you think could be caused by your cholesterol lowering medication?  No     Patient complains of edema in the right lower extremity for few years, has had surgery for uterine cancer had ultrasound right lower extremity negative for DVT.    Current providers sharing in care for this patient include:   Patient Care Team:  Monica Sultana MD as PCP - General  Monica Sultana MD as Assigned PCP    The following health maintenance items are reviewed in Epic and correct as of today:  Health Maintenance Due   Topic Date Due     ANNUAL REVIEW OF HM ORDERS  Never done     MEDICARE ANNUAL WELLNESS VISIT  07/17/2021          Pertinent mammograms are reviewed under the imaging tab.    Review of Systems   Constitutional: Negative for chills and fever.   HENT: Negative for congestion, ear pain, hearing loss and sore throat.    Eyes: Negative for pain and visual disturbance.   Respiratory: Negative for cough and shortness of breath.    Cardiovascular: Positive for peripheral edema. Negative for chest pain and palpitations.   Gastrointestinal: Negative for abdominal pain, constipation, heartburn, hematochezia and nausea.   Breasts:  Negative for tenderness, breast mass and discharge.   Genitourinary: Negative for dysuria, frequency, genital sores, hematuria, pelvic pain, urgency, vaginal bleeding and vaginal discharge.   Musculoskeletal: Negative for arthralgias, joint swelling and myalgias.   Skin: Negative for rash.   Neurological: Negative for dizziness, weakness, headaches and paresthesias.   Psychiatric/Behavioral: Negative for mood changes. The patient is not nervous/anxious.         OBJECTIVE:   /77   Pulse 76   Temp  "97.9  F (36.6  C) (Oral)   Resp 18   Ht 1.549 m (5' 1\")   Wt 63.5 kg (140 lb)   SpO2 96%   BMI 26.45 kg/m   Estimated body mass index is 26.45 kg/m  as calculated from the following:    Height as of this encounter: 1.549 m (5' 1\").    Weight as of this encounter: 63.5 kg (140 lb).  Physical Exam  GENERAL APPEARANCE: healthy, alert and no distress  EYES: Eyes grossly normal to inspection, PERRL and conjunctivae and sclerae normal  RESP: lungs clear to auscultation - no rales, rhonchi or wheezes  BREAST: normal without masses, tenderness or nipple discharge and no palpable axillary masses or adenopathy  CV: regular rate and rhythm, normal S1 S2, no S3 or S4, no murmur, click or rub, no peripheral edema and peripheral pulses strong  ABDOMEN: soft, nontender,  and bowel sounds normal  MS: no musculoskeletal defects are noted and gait is age appropriate without ataxia  NEURO: Normal strength and tone, sensory exam grossly normal, mentation intact and speech normal  PSYCH: mentation appears normal and affect normal/bright      ASSESSMENT / PLAN:      (Z00.00) Encounter for Medicare annual wellness exam  (primary encounter diagnosis)  Plan: Lipid panel reflex to direct LDL Fasting,         Hemoglobin, ALT            (I10) Essential hypertension  Plan: Blood pressure stable, continue lisinopril and hydrochlorothiazide.explained clearly about the medication,insructions and side effects.Advised to follow low salt diet and exercise and f/u in 6 mths.       (M81.0) Osteoporosis, unspecified osteoporosis type, unspecified pathological fracture presence  Plan: On Boniva once a month, check DX Hip/Pelvis/Spine            (R73.09) Elevated glucose  Plan: **A1C FUTURE 3mo            (R60.9) Edema, unspecified type  (I89.0) Lymphedema  Plan: Lymphedema Therapy Referral            Patient has been advised of split billing requirements and indicates understanding: Yes  COUNSELING:  Reviewed preventive health counseling, as " "reflected in patient instructions       Regular exercise       Healthy diet/nutrition    Estimated body mass index is 26.45 kg/m  as calculated from the following:    Height as of this encounter: 1.549 m (5' 1\").    Weight as of this encounter: 63.5 kg (140 lb).        She reports that she has never smoked. She has never used smokeless tobacco.      Appropriate preventive services were discussed with this patient, including applicable screening as appropriate for cardiovascular disease, diabetes, osteopenia/osteoporosis, and glaucoma.  As appropriate for age/gender, discussed screening for colorectal cancer, prostate cancer, breast cancer, and cervical cancer. Checklist reviewing preventive services available has been given to the patient.    Reviewed patients plan of care and provided an AVS. The Basic Care Plan (routine screening as documented in Health Maintenance) for Sophia meets the Care Plan requirement. This Care Plan has been established and reviewed with the Patient.    Counseling Resources:  ATP IV Guidelines  Pooled Cohorts Equation Calculator  Breast Cancer Risk Calculator  Breast Cancer: Medication to Reduce Risk  FRAX Risk Assessment  ICSI Preventive Guidelines  Dietary Guidelines for Americans, 2010  USDA's MyPlate  ASA Prophylaxis  Lung CA Screening    Monica Sultana MD  [facility]    Identified Health Risks:  "

## 2021-07-21 RX ORDER — SIMVASTATIN 10 MG
TABLET ORAL
Qty: 90 TABLET | Refills: 3 | OUTPATIENT
Start: 2021-07-21

## 2021-07-25 PROBLEM — E78.2 MIXED HYPERLIPIDEMIA: Status: ACTIVE | Noted: 2021-07-25

## 2021-08-02 ENCOUNTER — ANCILLARY PROCEDURE (OUTPATIENT)
Dept: BONE DENSITY | Facility: CLINIC | Age: 81
End: 2021-08-02
Attending: INTERNAL MEDICINE
Payer: COMMERCIAL

## 2021-08-02 DIAGNOSIS — M81.0 OSTEOPOROSIS, UNSPECIFIED OSTEOPOROSIS TYPE, UNSPECIFIED PATHOLOGICAL FRACTURE PRESENCE: ICD-10-CM

## 2021-08-02 PROCEDURE — 77085 DXA BONE DENSITY AXL VRT FX: CPT | Performed by: INTERNAL MEDICINE

## 2021-08-04 ENCOUNTER — HOSPITAL ENCOUNTER (OUTPATIENT)
Dept: OCCUPATIONAL THERAPY | Facility: CLINIC | Age: 81
Setting detail: THERAPIES SERIES
End: 2021-08-04
Attending: INTERNAL MEDICINE
Payer: COMMERCIAL

## 2021-08-04 DIAGNOSIS — I89.0 LYMPHEDEMA: ICD-10-CM

## 2021-08-04 PROCEDURE — 97535 SELF CARE MNGMENT TRAINING: CPT | Mod: GO

## 2021-08-04 PROCEDURE — 97165 OT EVAL LOW COMPLEX 30 MIN: CPT | Mod: GO

## 2021-08-04 NOTE — PROGRESS NOTES
08/04/21 1008   Rehab Discipline   Discipline OT   Type of Visit   Type of visit Initial Edema Evaluation   General Information   Start of care 08/04/21   Referring physician Monica Sultana MD   Orders Evaluate and treat as indicated   Order date 07/19/21   Medical diagnosis lymphedema   Onset of illness / date of surgery 07/19/21   Edema onset 07/19/21   Affected body parts RLE;LLE   Edema etiology Cancer with lymph node dissection;Radiation;Surgery;Trauma   Location - Cancer with lymph node dissection uterine cancer with inguinal LN removed 2005   Radiation comments for uterine cancer   Edema etiology comments Pt reports mild swelling in B LE chronically.  About 2 years ago, pt dropped a brick on her R foot.  There was no injury but since then pt has reported increasd swelling in her R LE.  She wears compression stockings (15 mmHg), elevates, and is active.     Pertinent history of current problem (PT: include personal factors and/or comorbidities that impact the POC; OT: include additional occupational profile info) HTN   Surgical / medical history reviewed Yes   Prior level of functional mobility ind   Prior treatment Compression garments;Diuretics;Elevation   Community support Family / friend caregiver   Patient role / employment history Retired   Psychosocial concerns Impaired body image   Living environment Apartment / condo   Living environment comments Pt lives in senior living with her .  She is very active and walks 1 hour/day and is out in the garden.   General observations Pt ind, steady in the clinic.  No compression stockings today, per pt it has been too hot   Fall Risk Screen   Fall screen completed by OT   Have you fallen 2 or more times in the past year? No   Have you fallen and had an injury in the past year? No   Is patient a fall risk? No   Abuse Screen (yes response referral indicated)   Feels Unsafe at Home or Work/School no   Feels Threatened by Someone no   Does Anyone  "Try to Keep You From Having Contact with Others or Doing Things Outside Your Home? no   Physical Signs of Abuse Present no   System Outcome Measures   Outcome Measures Lymphedema   Lymphedema Life Impact Scale (score range 0-72). A higher score indicates greater impairment. 10   Subjective Report   Patient report of symptoms difficulty finding shoes to fit, fullness, tightness   Patient / Family Goals   Patient / family goals statement \"I don't want to end up in a wheelchair with my legs wrapped\"   Pain   Patient currently in pain No   Cognitive Status   Orientation Orientation to person, place and time   Level of consciousness Alert   Edema Exam / Assessment   Skin condition Non-pitting;Dryness;Intact   Skin condition comments Pt with firm, non-pitting edema to just above knee in R LE.  Trace, soft edema in L LE.  Deep indentation in R ankle from sock, less so in L ankle.  Skin is diffusely dry but intact   Scar Yes   Location R abd   Capillary refill Symmetrical   Dorsal pedal pulse Symmetrical   Stemmer sign Negative   Girth Measurements   Girth Measurements Refer to separate girth measurement flowsheet   Volume LE   Right LE (mL) 3308.64   Left LE (mL) 2710.19   LE volume comparison RLE volume greater than LLE volume   % difference 19.9   Range of Motion   ROM comments Pt able to reach to feet and assume figure 4   Strength   Strength No deficits were identified   Activities of Daily Living   Activities of Daily Living ind   Bed Mobility   Bed mobility ind   Transfers   Transfers ind   Gait / Locomotion   Gait / Locomotion ind   Vascular Assessment   Vascular Assessment Rubor of Dependency   Planned Edema Interventions   Planned edema interventions Manual lymph drainage;Gradient compression bandaging;Fit for compression garment;Exercises;Precautions to prevent infection / exacerbation;Scar mobilization;Soft tissue mobilization;Home management program development;Skin care / precautions   Clinical Impression "   Criteria for skilled therapeutic intervention met Yes   Therapy diagnosis lymphedema   Influenced by the following impairments / conditions Stage 1   Assessment of Occupational Performance 1-3 Performance Deficits   Identified Performance Deficits high level IADL, infection risk   Clinical Decision Making (Complexity) Low complexity   Treatment Frequency 3x/week   Treatment duration 1 week, then 2x/week x4 weeks with follow up as needed   Patient / family and/or staff in agreement with plan of care Yes   Risks and benefits of therapy have been explained Yes   Clinical impression comments Pt presents with chronic swelling to R LE worsening over the last two years.  Pt to benefit from skilled OT for CDT in order to mobilize fluid and develop home management program in order to decrease infection risk and promote greater ease and ind with activities.    Education Assessment   Preferred learning style Listening;Reading;Demonstration;Pictures / video   Barriers to learning No barriers   Goals   Edema Eval Goals 1;2;3;4   Goal 1   Goal identifier GCB   Goal description In order to promote fluid mobilization for increased ease of functional mobility and decreased infection risk, pt will demonstrate independence with donning, doffing, and care of gradient compression bandages following education.   Target date 09/24/21   Goal 2   Goal identifier HEP/Self-MLD   Goal description In order to promote fluid mobilization for increased ease of functional mobility and decreased infection risk, pt will demonstrate independence with self-MLD and/or exercises to facilitate the lymphatic system.   Target date 10/15/21   Goal 3   Goal identifier Volume   Goal description In order to promote improved fit of clothing, functional mobility for ADL, and decreased infection risk, pt will demonstrate a 250mL reduction in R LE volume by discharge.    Target date 10/15/21   Goal 4   Goal identifier Home Management   Goal description In order  to reduce risk of infection and promote ind with long term management of lymphedema, pt will demonstrate understanding of use/care of compression garments, skin care routine, precautions/contraindications, and when to seek further treatment following education.   Target date 10/15/21   Total Evaluation Time   OT Kari, Low Complexity Minutes (39399) 30

## 2021-08-11 ENCOUNTER — RX ONLY (RX ONLY)
Age: 81
End: 2021-08-11

## 2021-08-11 ENCOUNTER — TRANSFERRED RECORDS (OUTPATIENT)
Dept: HEALTH INFORMATION MANAGEMENT | Facility: CLINIC | Age: 81
End: 2021-08-11

## 2021-08-11 RX ORDER — SPIRONOLACTONE 50 MG/1
TABLET, FILM COATED ORAL
Qty: 30 | Refills: 2 | Status: ERX | COMMUNITY
Start: 2021-08-11

## 2021-08-11 RX ORDER — MINOXIDIL 5 %
SOLUTION, NON-ORAL TOPICAL
Qty: 1 | Refills: 0 | Status: ERX | COMMUNITY
Start: 2021-08-11

## 2021-09-04 ENCOUNTER — HEALTH MAINTENANCE LETTER (OUTPATIENT)
Age: 81
End: 2021-09-04

## 2021-09-13 ENCOUNTER — MYC MEDICAL ADVICE (OUTPATIENT)
Dept: INTERNAL MEDICINE | Facility: CLINIC | Age: 81
End: 2021-09-13

## 2021-09-15 ENCOUNTER — HOSPITAL ENCOUNTER (OUTPATIENT)
Dept: OCCUPATIONAL THERAPY | Facility: CLINIC | Age: 81
Setting detail: THERAPIES SERIES
End: 2021-09-15
Attending: INTERNAL MEDICINE
Payer: COMMERCIAL

## 2021-09-15 PROCEDURE — 97140 MANUAL THERAPY 1/> REGIONS: CPT | Mod: GO | Performed by: OCCUPATIONAL THERAPIST

## 2021-09-15 NOTE — PROGRESS NOTES
"Outpatient Occupational Therapy Discharge Note     Patient: Sophia Olivarez  : 1940    Beginning/End Dates of Reporting Period:  21 to 9/15/21    Referring Provider: Dr. Sultana    Therapy Diagnosis: lymphedema    Client Self Report: When asked if she was ready for bandaging she states \"I'm here for my treatment\" but doesn't seem to recall GCB.    Objective Measurements:      No changeR>L12%    Outcome Measures (most recent score):   not assessed    Goals:   Goal Identifier GCB   Goal Description In order to promote fluid mobilization for increased ease of functional mobility and decreased infection risk, pt will demonstrate independence with donning, doffing, and care of gradient compression bandages following education.   Target Date 21   Date Met      Progress (detail required for progress note):  Not met     Goal Identifier HEP/Self-MLD   Goal Description In order to promote fluid mobilization for increased ease of functional mobility and decreased infection risk, pt will demonstrate independence with self-MLD and/or exercises to facilitate the lymphatic system.   Target Date 10/15/21   Date Met      Progress (detail required for progress note):  Not met     Goal Identifier Volume   Goal Description In order to promote improved fit of clothing, functional mobility for ADL, and decreased infection risk, pt will demonstrate a 250mL reduction in R LE volume by discharge.    Target Date 10/15/21   Date Met   not met   Progress (detail required for progress note):       Goal Identifier Home Management   Goal Description In order to reduce risk of infection and promote ind with long term management of lymphedema, pt will demonstrate understanding of use/care of compression garments, skin care routine, precautions/contraindications, and when to seek further treatment following education.   Target Date 10/15/21   Date Met      Progress (detail required for progress note):  Not met "   Plan:  Discharge from therapy.    Discharge:    Reason for Discharge: Medicare G-code: Patient did not attend a final scheduled session prior to discharge. Unable to determine discharge functional status.    Equipment Issued: GCB    Discharge Plan: Pt called support staff 20 min after her first 45 min appt cxing all further appts.

## 2021-10-27 ENCOUNTER — LAB (OUTPATIENT)
Dept: LAB | Facility: CLINIC | Age: 81
End: 2021-10-27
Payer: COMMERCIAL

## 2021-10-27 DIAGNOSIS — E78.2 MIXED HYPERLIPIDEMIA: ICD-10-CM

## 2021-10-27 PROCEDURE — 80061 LIPID PANEL: CPT

## 2021-10-27 PROCEDURE — 36415 COLL VENOUS BLD VENIPUNCTURE: CPT

## 2021-10-28 LAB
CHOLEST SERPL-MCNC: 182 MG/DL
FASTING STATUS PATIENT QL REPORTED: YES
HDLC SERPL-MCNC: 85 MG/DL
LDLC SERPL CALC-MCNC: 79 MG/DL
NONHDLC SERPL-MCNC: 97 MG/DL
TRIGL SERPL-MCNC: 92 MG/DL

## 2021-12-10 ENCOUNTER — NURSE TRIAGE (OUTPATIENT)
Dept: INTERNAL MEDICINE | Facility: CLINIC | Age: 81
End: 2021-12-10
Payer: COMMERCIAL

## 2021-12-10 NOTE — TELEPHONE ENCOUNTER
Reason for Call:  Other appointment    Detailed comments: Sophia has been having pain on her right side that goes back to her back near her kidney. She would like an appointment as soon as possible with Dr. Sultana, but first available shown to central scheduling was 1/17/22.    Phone Number Patient can be reached at: Cell number on file:    Telephone Information:   Mobile [phone]       Best Time: any time    Can we leave a detailed message on this number? YES    Call taken on 12/10/2021 at 1:16 PM by Ami Donovan

## 2021-12-28 ENCOUNTER — OFFICE VISIT (OUTPATIENT)
Dept: INTERNAL MEDICINE | Facility: CLINIC | Age: 81
End: 2021-12-28
Payer: COMMERCIAL

## 2021-12-28 VITALS
TEMPERATURE: 96.6 F | HEIGHT: 61 IN | RESPIRATION RATE: 11 BRPM | BODY MASS INDEX: 26.28 KG/M2 | HEART RATE: 75 BPM | SYSTOLIC BLOOD PRESSURE: 136 MMHG | OXYGEN SATURATION: 96 % | DIASTOLIC BLOOD PRESSURE: 73 MMHG | WEIGHT: 139.2 LBS

## 2021-12-28 DIAGNOSIS — R10.9 FLANK PAIN: ICD-10-CM

## 2021-12-28 DIAGNOSIS — C55 MALIGNANT NEOPLASM OF UTERUS, UNSPECIFIED SITE (H): ICD-10-CM

## 2021-12-28 DIAGNOSIS — I10 ESSENTIAL HYPERTENSION: Primary | ICD-10-CM

## 2021-12-28 LAB
ALBUMIN UR-MCNC: NEGATIVE MG/DL
ANION GAP SERPL CALCULATED.3IONS-SCNC: 7 MMOL/L (ref 3–14)
APPEARANCE UR: CLEAR
BACTERIA #/AREA URNS HPF: ABNORMAL /HPF
BILIRUB UR QL STRIP: NEGATIVE
BUN SERPL-MCNC: 10 MG/DL (ref 7–30)
CALCIUM SERPL-MCNC: 9.6 MG/DL (ref 8.5–10.1)
CHLORIDE BLD-SCNC: 102 MMOL/L (ref 94–109)
CO2 SERPL-SCNC: 28 MMOL/L (ref 20–32)
COLOR UR AUTO: YELLOW
CREAT SERPL-MCNC: 0.54 MG/DL (ref 0.52–1.04)
GFR SERPL CREATININE-BSD FRML MDRD: >90 ML/MIN/1.73M2
GLUCOSE BLD-MCNC: 103 MG/DL (ref 70–99)
GLUCOSE UR STRIP-MCNC: NEGATIVE MG/DL
HGB UR QL STRIP: ABNORMAL
KETONES UR STRIP-MCNC: NEGATIVE MG/DL
LEUKOCYTE ESTERASE UR QL STRIP: ABNORMAL
NITRATE UR QL: NEGATIVE
PH UR STRIP: 7 [PH] (ref 5–7)
POTASSIUM BLD-SCNC: 3.5 MMOL/L (ref 3.4–5.3)
RBC #/AREA URNS AUTO: ABNORMAL /HPF
SODIUM SERPL-SCNC: 137 MMOL/L (ref 133–144)
SP GR UR STRIP: 1.01 (ref 1–1.03)
SQUAMOUS #/AREA URNS AUTO: ABNORMAL /LPF
UROBILINOGEN UR STRIP-ACNC: 0.2 E.U./DL
WBC #/AREA URNS AUTO: ABNORMAL /HPF

## 2021-12-28 PROCEDURE — 99214 OFFICE O/P EST MOD 30 MIN: CPT | Performed by: INTERNAL MEDICINE

## 2021-12-28 PROCEDURE — 80048 BASIC METABOLIC PNL TOTAL CA: CPT | Performed by: INTERNAL MEDICINE

## 2021-12-28 PROCEDURE — 81001 URINALYSIS AUTO W/SCOPE: CPT | Performed by: INTERNAL MEDICINE

## 2021-12-28 PROCEDURE — 36415 COLL VENOUS BLD VENIPUNCTURE: CPT | Performed by: INTERNAL MEDICINE

## 2021-12-28 ASSESSMENT — MIFFLIN-ST. JEOR: SCORE: 1043.79

## 2021-12-28 NOTE — NURSING NOTE
"/73   Pulse 75   Temp (!) 96.6  F (35.9  C) (Axillary)   Resp 11   Ht 1.549 m (5' 1\")   Wt 63.1 kg (139 lb 3.2 oz)   SpO2 96%   BMI 26.30 kg/m    Patient in for Constant Rt flank pain x's 1 month.  "

## 2021-12-28 NOTE — PROGRESS NOTES
Assessment & Plan     (I10) Essential hypertension  (primary encounter diagnosis)  Comment: BP stable  Plan: Basic metabolic panel, refilled hydrochlorothiazide (HYDRODIURIL) 25 MG tablet, lisinopril         (ZESTRIL) 5 MG tablet as directed.explained clearly about the medication,insructions and side effects. Advised to follow low salt diet and exercise and f/u in 6 mths.      (R10.9) Flank pain  Plan:and rt back pain , check UA with Microscopic reflex to Culture - lab         collect, Urine Microscopic.pt was told I will contact her after results and proceed accordingly. Recommended CT ABD /pelvis but would like to wait   Will obtain CT abd/pelvis if symptoms persists or worsens.           (C55) Malignant neoplasm of uterus, unspecified site (H)  Plan: s/p hysterectomy      Review of the result(s) of each unique test - UA  Prescription drug management       Return in about 6 months (around 6/28/2022).    Monica Sultana MD  [facility]    Deb Kingston is a 81 year old who presents for the following health issues   HPI       Pt is a 81 year old female who is seen here to day with c/o rt flank pain since 1 month , some times radiating to Rt back , occurs intemittemtly,more while sitting.laying down. Pain gets better with walking . No UTI symptoms, has h/o kidney stones. Took tylenol with some relief.     Hypertension Follow-up      Do you check your blood pressure regularly outside of the clinic? No     Are you following a low salt diet? Yes    Are your blood pressures ever more than 140 on the top number (systolic) OR more   than 90 on the bottom number (diastolic), for example 140/90? No         Past Medical History:   Diagnosis Date     Benign neoplasm of skin, site unspecified     sees derm.     Calculus of kidney     3 passed spont.     Chronic subinvolution of uterus      Disorder of bone and cartilage, unspecified     osteopenia     Dyspepsia and other specified  disorders of function of stomach     dyspepsia     Generalized osteoarthrosis, unspecified site      H/O thyroid nodule     f/u with endocrinologist     Hematuria     microscopic     HTN (hypertension)      Malignant neoplasm of corpus uteri, except isthmus (H)     surgery and radiation rx.stage IC, grade 3 endometrial carcinoma.     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      Nontoxic multinodular goiter     sees endocrine     Other and unspecified hyperlipidemia          Current Outpatient Medications   Medication Sig Dispense Refill     Ascorbic Acid (VITAMIN C CR) 1000 MG TBCR        Cholecalciferol (VITAMIN D3 PO) Take 2,000 Units by mouth daily       Cyanocobalamin ([employer] VITAMIN B12) 2000 MCG TABS        fluticasone (FLONASE) 50 MCG/ACT nasal spray INSTILL 2 SPRAYS INTO BOTH NOSTRILS DAILY 16 g 3     hydrochlorothiazide (HYDRODIURIL) 25 MG tablet TAKE 1 TABLET (25 MG) BY MOUTH DAILY 90 tablet 1     IBANdronate (BONIVA) 150 MG tablet Take 1 tablet (150 mg) by mouth every 30 days 3 tablet 3     lisinopril (ZESTRIL) 5 MG tablet Take 1 tablet (5 mg) by mouth daily 90 tablet 1     loperamide (IMODIUM A-D) 2 MG tablet Take 2 mg by mouth as needed for diarrhea       Multiple Vitamins-Minerals (MULTIVITAMIN & MINERAL PO) Take 1 tablet by mouth daily       omega-3 fatty acids 1200 MG capsule Take 1 capsule by mouth daily 90 capsule      saccharomyces boulardii (FLORASTOR) 250 MG capsule Take 250 mg by mouth 2 times daily       simvastatin (ZOCOR) 10 MG tablet Take 1 tablet (10 mg) by mouth At Bedtime 90 tablet 3         Review of Systems   CONSTITUTIONAL: NEGATIVE for fever, chills, change in weight  RESP: NEGATIVE for significant cough or SOB  CV: NEGATIVE for chest pain, palpitations or peripheral edema  MUSCULOSKELETAL: rt back pain/rt flank  "pain   ; no dysuria or blood in urine  NEURO: NEGATIVE for weakness, dizziness or paresthesias  PSYCHIATRIC: NEGATIVE for changes in mood or affect      Objective    /73   Pulse 75   Temp (!) 96.6  F (35.9  C) (Axillary)   Resp 11   Ht 1.549 m (5' 1\")   Wt 63.1 kg (139 lb 3.2 oz)   SpO2 96%   BMI 26.30 kg/m    Body mass index is 26.3 kg/m .  Physical Exam   GENERAL: healthy, alert and no distress  RESP: lungs clear to auscultation - no rales, rhonchi or wheezes  CV: regular rate and rhythm, normal S1 S2, no S3 or S4, no murmur, click or rub, no peripheral edema and peripheral pulses strong  ABDOMEN: soft, nontender, no hepatosplenomegaly, no masses and bowel sounds normal  MS: no gross musculoskeletal defects noted, no edema  NEURO: Normal strength and tone, mentation intact and speech normal    Results for orders placed or performed in visit on 12/28/21   Basic metabolic panel     Status: Abnormal   Result Value Ref Range    Sodium 137 133 - 144 mmol/L    Potassium 3.5 3.4 - 5.3 mmol/L    Chloride 102 94 - 109 mmol/L    Carbon Dioxide (CO2) 28 20 - 32 mmol/L    Anion Gap 7 3 - 14 mmol/L    Urea Nitrogen 10 7 - 30 mg/dL    Creatinine 0.54 0.52 - 1.04 mg/dL    Calcium 9.6 8.5 - 10.1 mg/dL    Glucose 103 (H) 70 - 99 mg/dL    GFR Estimate >90 >60 mL/min/1.73m2   UA with Microscopic reflex to Culture - lab collect     Status: Abnormal    Specimen: Urine, Midstream   Result Value Ref Range    Color Urine Yellow Colorless, Straw, Light Yellow, Yellow    Appearance Urine Clear Clear    Glucose Urine Negative Negative mg/dL    Bilirubin Urine Negative Negative    Ketones Urine Negative Negative mg/dL    Specific Gravity Urine 1.015 1.003 - 1.035    Blood Urine Trace (A) Negative    pH Urine 7.0 5.0 - 7.0    Protein Albumin Urine Negative Negative mg/dL    Urobilinogen Urine 0.2 0.2, 1.0 E.U./dL    Nitrite Urine Negative Negative    Leukocyte Esterase Urine Small (A) Negative   Urine Microscopic     Status: " Abnormal   Result Value Ref Range    Bacteria Urine Few (A) None Seen /HPF    RBC Urine 0-2 0-2 /HPF /HPF    WBC Urine 0-5 0-5 /HPF /HPF    Squamous Epithelials Urine None Seen None Seen /LPF    Narrative    Urine Culture not indicated

## 2021-12-29 ENCOUNTER — MYC MEDICAL ADVICE (OUTPATIENT)
Dept: INTERNAL MEDICINE | Facility: CLINIC | Age: 81
End: 2021-12-29
Payer: COMMERCIAL

## 2022-01-02 RX ORDER — LISINOPRIL 5 MG/1
5 TABLET ORAL DAILY
Qty: 90 TABLET | Refills: 1 | Status: SHIPPED | OUTPATIENT
Start: 2022-01-02 | End: 2022-07-20

## 2022-01-02 RX ORDER — HYDROCHLOROTHIAZIDE 25 MG/1
TABLET ORAL
Qty: 90 TABLET | Refills: 1 | Status: SHIPPED | OUTPATIENT
Start: 2022-01-02 | End: 2022-07-20

## 2022-01-06 ENCOUNTER — MYC MEDICAL ADVICE (OUTPATIENT)
Dept: INTERNAL MEDICINE | Facility: CLINIC | Age: 82
End: 2022-01-06
Payer: COMMERCIAL

## 2022-01-06 DIAGNOSIS — R10.9 FLANK PAIN: Primary | ICD-10-CM

## 2022-01-07 NOTE — TELEPHONE ENCOUNTER
Per 12/28/21 Office visit note:    (R10.9) Flank pain  Plan:and rt back pain , check UA with Microscopic reflex to Culture - lab         collect, Urine Microscopic.pt was told I will contact her after results and proceed accordingly. Recommended CT ABD /pelvis but would like to wait   Will obtain CT abd/pelvis if symptoms persists or worsens.    Plese see MyChart Message.     Is CAT Scan/ CT scan appropriate?     Pt is requesting scan to be done at   [facility]   [address] [place] [address] [address]    Thank you,  Sheri Mccormick, XIAO  [facility]

## 2022-01-13 ENCOUNTER — TRANSFERRED RECORDS (OUTPATIENT)
Dept: HEALTH INFORMATION MANAGEMENT | Facility: CLINIC | Age: 82
End: 2022-01-13
Payer: COMMERCIAL

## 2022-01-18 ENCOUNTER — TELEPHONE (OUTPATIENT)
Dept: INTERNAL MEDICINE | Facility: CLINIC | Age: 82
End: 2022-01-18
Payer: COMMERCIAL

## 2022-01-18 NOTE — TELEPHONE ENCOUNTER
Patient would like the results of her CT abdomen that she had done at [facility]. See results in your mailbox and reply to the patient please.

## 2022-01-18 NOTE — TELEPHONE ENCOUNTER
Call to patient. Patient informed of Dr. Sultana's response below. Patient verbalized understanding.     Jasmine MORENO RN   [facility]

## 2022-03-14 ENCOUNTER — APPOINTMENT (OUTPATIENT)
Dept: URBAN - METROPOLITAN AREA CLINIC 253 | Age: 82
Setting detail: DERMATOLOGY
End: 2022-03-14

## 2022-03-14 VITALS — RESPIRATION RATE: 15 BRPM | HEIGHT: 61 IN | WEIGHT: 135 LBS

## 2022-03-14 DIAGNOSIS — L82.1 OTHER SEBORRHEIC KERATOSIS: ICD-10-CM

## 2022-03-14 DIAGNOSIS — Z71.89 OTHER SPECIFIED COUNSELING: ICD-10-CM

## 2022-03-14 DIAGNOSIS — L81.4 OTHER MELANIN HYPERPIGMENTATION: ICD-10-CM

## 2022-03-14 DIAGNOSIS — D18.0 HEMANGIOMA: ICD-10-CM

## 2022-03-14 DIAGNOSIS — D22 MELANOCYTIC NEVI: ICD-10-CM

## 2022-03-14 PROBLEM — D22.5 MELANOCYTIC NEVI OF TRUNK: Status: ACTIVE | Noted: 2022-03-14

## 2022-03-14 PROBLEM — D23.0 OTHER BENIGN NEOPLASM OF SKIN OF LIP: Status: ACTIVE | Noted: 2022-03-14

## 2022-03-14 PROBLEM — D18.01 HEMANGIOMA OF SKIN AND SUBCUTANEOUS TISSUE: Status: ACTIVE | Noted: 2022-03-14

## 2022-03-14 PROCEDURE — OTHER COUNSELING: OTHER

## 2022-03-14 PROCEDURE — OTHER ADDITIONAL NOTES: OTHER

## 2022-03-14 PROCEDURE — OTHER MIPS QUALITY: OTHER

## 2022-03-14 PROCEDURE — 99213 OFFICE O/P EST LOW 20 MIN: CPT

## 2022-03-14 ASSESSMENT — LOCATION ZONE DERM: LOCATION ZONE: TRUNK

## 2022-03-14 ASSESSMENT — LOCATION SIMPLE DESCRIPTION DERM: LOCATION SIMPLE: UPPER BACK

## 2022-03-14 ASSESSMENT — LOCATION DETAILED DESCRIPTION DERM: LOCATION DETAILED: INFERIOR THORACIC SPINE

## 2022-03-14 NOTE — PROCEDURE: ADDITIONAL NOTES
Additional Notes: Extracted at no charge today.
Detail Level: Zone
Render Risk Assessment In Note?: no

## 2022-05-19 ENCOUNTER — TELEPHONE (OUTPATIENT)
Dept: INTERNAL MEDICINE | Facility: CLINIC | Age: 82
End: 2022-05-19

## 2022-05-19 NOTE — TELEPHONE ENCOUNTER
Fax received from [facility] - Denial Letter [employer] for review and signature.  Put in Dr. Sultana's in basket.

## 2022-07-12 DIAGNOSIS — E78.2 MIXED HYPERLIPIDEMIA: ICD-10-CM

## 2022-07-12 RX ORDER — SIMVASTATIN 10 MG
10 TABLET ORAL AT BEDTIME
Qty: 90 TABLET | Refills: 0 | Status: SHIPPED | OUTPATIENT
Start: 2022-07-12 | End: 2022-07-20

## 2022-07-12 NOTE — TELEPHONE ENCOUNTER
Prescription approved per [facility], [facility] or [facility] refill protocol.  Rekha CNOTRERAS - Registered Nurse  [facility]  Acute and Diagnostic Services

## 2022-07-14 ASSESSMENT — ENCOUNTER SYMPTOMS
HEMATOCHEZIA: 0
CHILLS: 0
DYSURIA: 0
DIARRHEA: 1
BREAST MASS: 0
ARTHRALGIAS: 0
CONSTIPATION: 0
FEVER: 0
MYALGIAS: 0
NAUSEA: 0
PARESTHESIAS: 0
SORE THROAT: 0
DIZZINESS: 0
ABDOMINAL PAIN: 0
HEMATURIA: 0
EYE PAIN: 0
COUGH: 0
NERVOUS/ANXIOUS: 0
FREQUENCY: 0
HEARTBURN: 0
WEAKNESS: 0
SHORTNESS OF BREATH: 0
PALPITATIONS: 0
JOINT SWELLING: 0
HEADACHES: 0

## 2022-07-14 ASSESSMENT — ACTIVITIES OF DAILY LIVING (ADL): CURRENT_FUNCTION: NO ASSISTANCE NEEDED

## 2022-07-20 ENCOUNTER — OFFICE VISIT (OUTPATIENT)
Dept: INTERNAL MEDICINE | Facility: CLINIC | Age: 82
End: 2022-07-20
Payer: COMMERCIAL

## 2022-07-20 VITALS
SYSTOLIC BLOOD PRESSURE: 132 MMHG | HEIGHT: 61 IN | DIASTOLIC BLOOD PRESSURE: 78 MMHG | TEMPERATURE: 96.7 F | HEART RATE: 80 BPM | RESPIRATION RATE: 14 BRPM | WEIGHT: 137.6 LBS | OXYGEN SATURATION: 97 % | BODY MASS INDEX: 25.98 KG/M2

## 2022-07-20 DIAGNOSIS — C55 MALIGNANT NEOPLASM OF UTERUS, UNSPECIFIED SITE (H): ICD-10-CM

## 2022-07-20 DIAGNOSIS — E78.2 MIXED HYPERLIPIDEMIA: ICD-10-CM

## 2022-07-20 DIAGNOSIS — E04.2 MULTIPLE THYROID NODULES: ICD-10-CM

## 2022-07-20 DIAGNOSIS — M81.0 OSTEOPOROSIS, UNSPECIFIED OSTEOPOROSIS TYPE, UNSPECIFIED PATHOLOGICAL FRACTURE PRESENCE: ICD-10-CM

## 2022-07-20 DIAGNOSIS — Z00.00 ENCOUNTER FOR MEDICARE ANNUAL WELLNESS EXAM: Primary | ICD-10-CM

## 2022-07-20 DIAGNOSIS — I10 ESSENTIAL HYPERTENSION: ICD-10-CM

## 2022-07-20 LAB
ERYTHROCYTE [DISTWIDTH] IN BLOOD BY AUTOMATED COUNT: 13.1 % (ref 10–15)
HBA1C MFR BLD: 5.5 % (ref 0–5.6)
HCT VFR BLD AUTO: 37.6 % (ref 35–47)
HGB BLD-MCNC: 12.3 G/DL (ref 11.7–15.7)
MCH RBC QN AUTO: 28.7 PG (ref 26.5–33)
MCHC RBC AUTO-ENTMCNC: 32.7 G/DL (ref 31.5–36.5)
MCV RBC AUTO: 88 FL (ref 78–100)
PLATELET # BLD AUTO: 230 10E3/UL (ref 150–450)
RBC # BLD AUTO: 4.28 10E6/UL (ref 3.8–5.2)
WBC # BLD AUTO: 3.9 10E3/UL (ref 4–11)

## 2022-07-20 PROCEDURE — 84443 ASSAY THYROID STIM HORMONE: CPT | Performed by: INTERNAL MEDICINE

## 2022-07-20 PROCEDURE — 99214 OFFICE O/P EST MOD 30 MIN: CPT | Mod: 25 | Performed by: INTERNAL MEDICINE

## 2022-07-20 PROCEDURE — 85027 COMPLETE CBC AUTOMATED: CPT | Performed by: INTERNAL MEDICINE

## 2022-07-20 PROCEDURE — 80061 LIPID PANEL: CPT | Performed by: INTERNAL MEDICINE

## 2022-07-20 PROCEDURE — 80053 COMPREHEN METABOLIC PANEL: CPT | Performed by: INTERNAL MEDICINE

## 2022-07-20 PROCEDURE — 83036 HEMOGLOBIN GLYCOSYLATED A1C: CPT | Performed by: INTERNAL MEDICINE

## 2022-07-20 PROCEDURE — 36415 COLL VENOUS BLD VENIPUNCTURE: CPT | Performed by: INTERNAL MEDICINE

## 2022-07-20 PROCEDURE — 99397 PER PM REEVAL EST PAT 65+ YR: CPT | Performed by: INTERNAL MEDICINE

## 2022-07-20 RX ORDER — LISINOPRIL 5 MG/1
5 TABLET ORAL DAILY
Qty: 90 TABLET | Refills: 1 | Status: SHIPPED | OUTPATIENT
Start: 2022-07-20 | End: 2022-12-21

## 2022-07-20 RX ORDER — IBANDRONATE SODIUM 150 MG/1
150 TABLET, FILM COATED ORAL
Qty: 3 TABLET | Refills: 3 | Status: SHIPPED | OUTPATIENT
Start: 2022-07-20 | End: 2023-05-17

## 2022-07-20 RX ORDER — SIMVASTATIN 10 MG
10 TABLET ORAL AT BEDTIME
Qty: 90 TABLET | Refills: 3 | Status: SHIPPED | OUTPATIENT
Start: 2022-07-20 | End: 2023-07-24

## 2022-07-20 RX ORDER — HYDROCHLOROTHIAZIDE 25 MG/1
TABLET ORAL
Qty: 90 TABLET | Refills: 1 | Status: SHIPPED | OUTPATIENT
Start: 2022-07-20 | End: 2022-12-21

## 2022-07-20 ASSESSMENT — ENCOUNTER SYMPTOMS
PALPITATIONS: 0
SORE THROAT: 0
NERVOUS/ANXIOUS: 0
HEARTBURN: 0
HEADACHES: 0
CHILLS: 0
FREQUENCY: 0
DYSURIA: 0
FEVER: 0
SHORTNESS OF BREATH: 0
HEMATURIA: 0
DIZZINESS: 0
PARESTHESIAS: 0
COUGH: 0
EYE PAIN: 0
JOINT SWELLING: 0
ABDOMINAL PAIN: 0
NAUSEA: 0
HEMATOCHEZIA: 0
ARTHRALGIAS: 0
DIARRHEA: 1
CONSTIPATION: 0
WEAKNESS: 0
BREAST MASS: 0
MYALGIAS: 0

## 2022-07-20 ASSESSMENT — ACTIVITIES OF DAILY LIVING (ADL): CURRENT_FUNCTION: NO ASSISTANCE NEEDED

## 2022-07-20 NOTE — PROGRESS NOTES
"SUBJECTIVE:   Sophia Olivarez is a 81 year old female who presents for Preventive Visit.      Patient has been advised of split billing requirements and indicates understanding: Yes  Are you in the first 12 months of your Medicare coverage?  No    Healthy Habits:     In general, how would you rate your overall health?  Good    Frequency of exercise:  None    Do you usually eat at least 4 servings of fruit and vegetables a day, include whole grains    & fiber and avoid regularly eating high fat or \"junk\" foods?  Yes    Taking medications regularly:  Yes    Medication side effects:  None    Ability to successfully perform activities of daily living:  No assistance needed    Home Safety:  No safety concerns identified    Hearing Impairment:  No hearing concerns    In the past 6 months, have you been bothered by leaking of urine?  No    In general, how would you rate your overall mental or emotional health?  Good      PHQ-2 Total Score: 0    Additional concerns today:  No    Do you feel safe in your environment? Yes    Have you ever done Advance Care Planning? (For example, a Health Directive, POLST, or a discussion with a medical provider or your loved ones about your wishes): Yes, advance care planning is on file.       Fall risk  Fallen 2 or more times in the past year?: No  Any fall with injury in the past year?: No    Cognitive Screening   1) Repeat 3 items (Leader, Season, Table)    2) Clock draw: NORMAL  3) 3 item recall: Recalls 3 objects  Results: 3 items recalled: COGNITIVE IMPAIRMENT LESS LIKELY    Mini-CogTM Copyright [employer]. Licensed by the author for use in [facility]; reprinted with permission ([email or web address]@.[facility]). All rights reserved.      Do you have sleep apnea, excessive snoring or daytime drowsiness?: no    Reviewed and updated as needed this visit by clinical staff                    Reviewed and updated as needed this visit by Provider                   Past Medical History:   Diagnosis " Date     Benign neoplasm of skin, site unspecified     sees derm.     Calculus of kidney     3 passed spont.     Chronic subinvolution of uterus      Disorder of bone and cartilage, unspecified     osteopenia     Dyspepsia and other specified disorders of function of stomach     dyspepsia     Generalized osteoarthrosis, unspecified site      H/O thyroid nodule     f/u with endocrinologist     Hematuria     microscopic     HTN (hypertension)      Malignant neoplasm of corpus uteri, except isthmus (H)     surgery and radiation rx.stage IC, grade 3 endometrial carcinoma.     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      Nontoxic multinodular goiter     sees endocrine     Other and unspecified hyperlipidemia        Past Surgical History:   Procedure Laterality Date     ABDOMEN SURGERY  2005    uterine cancer Hysterectiomy     BIOPSY      many times from nodules from thyroid     CHOLECYSTECTOMY  1992     GYN SURGERY  2005    uterine cancer     HEAD & NECK SURGERY      right thyroid taken out     [facility] NONSPECIFIC PROCEDURE  1992    Cholecystectomy. abstracted 6/24/02.     [facility] NONSPECIFIC PROCEDURE      Thyroidectomy. abstracted 6/24/02.     [facility] NONSPECIFIC PROCEDURE      Dilatation & Curettage X 2. abstracted 6/24/02.     [facility] NONSPECIFIC PROCEDURE      Cystoscopy. abstracted 6/24/02.     [facility] NONSPECIFIC PROCEDURE      hysterctomy for uterine cancer.     [facility] COLONOSCOPY THRU STOMA, DIAGNOSTIC  10/2007    due q 5 yrs       Current Outpatient Medications   Medication Sig Dispense Refill     Ascorbic Acid (VITAMIN C CR) 1000 MG TBCR        Cholecalciferol (VITAMIN D3 PO) Take 2,000 Units by mouth daily       diphenoxylate-atropine (LOMOTIL) 2.5-0.025 MG tablet Take 1 tablet by mouth 3 times daily as needed for diarrhea 90 tablet 1     fluticasone (FLONASE) 50 MCG/ACT nasal spray INSTILL 2 SPRAYS INTO BOTH NOSTRILS DAILY 16 g 3      hydrochlorothiazide (HYDRODIURIL) 25 MG tablet TAKE 1 TABLET (25 MG) BY MOUTH DAILY 90 tablet 1     IBANdronate (BONIVA) 150 MG tablet Take 1 tablet (150 mg) by mouth every 30 days 3 tablet 3     lisinopril (ZESTRIL) 5 MG tablet Take 1 tablet (5 mg) by mouth daily 90 tablet 1     loperamide (IMODIUM A-D) 2 MG tablet Take 2 mg by mouth as needed for diarrhea       Multiple Vitamins-Minerals (MULTIVITAMIN & MINERAL PO) Take 1 tablet by mouth daily       omega-3 fatty acids 1200 MG capsule Take 1 capsule by mouth daily 90 capsule      saccharomyces boulardii (FLORASTOR) 250 MG capsule Take 250 mg by mouth 2 times daily       simvastatin (ZOCOR) 10 MG tablet Take 1 tablet (10 mg) by mouth At Bedtime 90 tablet 3     vitamin B-12 (CYANOCOBALAMIN) 2000 MCG tablet          Family History   Problem Relation Age of Onset     Family History Negative Mother      Other Cancer Mother      Osteoporosis Mother      Family History Negative Father      Other Cancer Father      Neurologic Disorder Sister         MS     Diabetes Brother         also, colon CA, colon surgery     Diabetes Sister      Hypertension Sister      Hyperlipidemia Sister      Anxiety Disorder Sister      Mental Illness Sister         bi polar     Obesity Sister         from some pills too     Diabetes Brother      Colon Cancer Brother      Other Cancer Brother      Anxiety Disorder Brother      Mental Illness Brother         schitzophenia     Obesity Brother         from pills too       Social History     Tobacco Use     Smoking status: Never Smoker     Smokeless tobacco: Never Used   Substance Use Topics     Alcohol use: No     If you drink alcohol do you typically have >3 drinks per day or >7 drinks per week? No    Alcohol Use 7/14/2022   Prescreen: >3 drinks/day or >7 drinks/week? No   Prescreen: >3 drinks/day or >7 drinks/week? -   No flowsheet data found.        Hyperlipidemia Follow-Up      Are you regularly taking any medication or supplement to lower  your cholesterol?   Yes- simvastatin    Are you having muscle aches or other side effects that you think could be caused by your cholesterol lowering medication?  No    Hypertension Follow-up      Do you check your blood pressure regularly outside of the clinic? No     Are you following a low salt diet? Yes    Are your blood pressures ever more than 140 on the top number (systolic) OR more   than 90 on the bottom number (diastolic), for example 140/90? No    Osteoporosis; on Boniva, tolerating well    Multiple thyroid nodules: Follows up with endocrinologist Dr. Chew      Current providers sharing in care for this patient include:   Patient Care Team:  Monica Sultana MD as PCP - General  Monica Sultana MD as Assigned PCP    The following health maintenance items are reviewed in Epic and correct as of today:  Health Maintenance Due   Topic Date Due     ANNUAL REVIEW OF HM ORDERS  Never done     TSH W/FREE T4 REFLEX  12/09/2021     COVID-19 Vaccine (4 - Booster for Pfizer series) 01/30/2022     MEDICARE ANNUAL WELLNESS VISIT  07/19/2022           Pertinent mammograms are reviewed under the imaging tab.    Review of Systems   Constitutional: Negative for chills and fever.   HENT: Negative for congestion, ear pain, hearing loss and sore throat.    Eyes: Negative for pain and visual disturbance.   Respiratory: Negative for cough and shortness of breath.    Cardiovascular: Positive for peripheral edema. Negative for chest pain and palpitations.   Gastrointestinal: Positive for diarrhea. Negative for abdominal pain, constipation, heartburn, hematochezia and nausea.   Breasts:  Negative for tenderness, breast mass and discharge.   Genitourinary: Negative for dysuria, frequency, genital sores, hematuria, pelvic pain, urgency, vaginal bleeding and vaginal discharge.   Musculoskeletal: Negative for arthralgias, joint swelling and myalgias.   Skin: Negative for rash.   Neurological: Negative for  "dizziness, weakness, headaches and paresthesias.   Psychiatric/Behavioral: Negative for mood changes. The patient is not nervous/anxious.         OBJECTIVE:   /78   Pulse 80   Temp (!) 96.7  F (35.9  C) (Tympanic)   Resp 14   Ht 1.549 m (5' 1\")   Wt 62.4 kg (137 lb 9.6 oz)   SpO2 97%   BMI 26.00 kg/m   Estimated body mass index is 26 kg/m  as calculated from the following:    Height as of this encounter: 1.549 m (5' 1\").    Weight as of this encounter: 62.4 kg (137 lb 9.6 oz).  Physical Exam  GENERAL APPEARANCE: healthy, alert and no distress  EYES: Eyes grossly normal to inspection, PERRL and conjunctivae and sclerae normal  HENT: ear canals and TM's normal,    NECK: no adenopathy, no asymmetry, masses, or scars and thyroid normal to palpation  RESP: lungs clear to auscultation - no rales, rhonchi or wheezes  BREAST: normal without masses, tenderness or nipple discharge and no palpable axillary masses or adenopathy  CV: regular rate and rhythm, normal S1 S2,   ABDOMEN: soft, nontender, no hepatosplenomegaly, no masses and bowel sounds normal  MS:  lower extremity lymphedema present (wearing compression socks) no calf tenderness bilaterally  NEURO: Normal strength and tone, sensory exam grossly normal, mentation intact and speech normal  PSYCH: mentation appears normal and affect normal/bright     ASSESSMENT / PLAN:       (Z00.00) Encounter for Medicare annual wellness exam  (primary encounter diagnosis)  Plan: Comprehensive metabolic panel, Lipid panel         reflex to direct LDL Fasting, CBC with         platelets, TSH with free T4 reflex, Hemoglobin         A1c, MA Screening Digital Bilateral            (M81.0) Osteoporosis, unspecified osteoporosis type, unspecified pathological fracture presence  Plan: IBANdronate (BONIVA) 150 MG tablet refilled.explained clearly about the medication,insructions and side effects.           (I10) Essential hypertension  Comment: Blood pressure stable  Plan: " "hydrochlorothiazide (HYDRODIURIL) 25 MG tablet, lisinopril (ZESTRIL) 5 MG tablet refilled as directed.explained clearly about the medication,insructions and side effects.      (E78.2) Mixed hyperlipidemia  Plan: Check lipid panel, refilled simvastatin (ZOCOR) 10 MG tablet as directed.explained clearly about the medication,insructions and side effects.            (C55) Malignant neoplasm of uterus, unspecified site (H)  Plan: S/p hysterectomy with  lower extremity lymphedema    (E04.2) Multiple thyroid nodules  Plan: Follows up with endocrinologist and making appointment soon       Patient has been advised of split billing requirements and indicates understanding: Yes    COUNSELING:  Reviewed preventive health counseling, as reflected in patient instructions       Regular exercise       Healthy diet/nutrition    Estimated body mass index is 26 kg/m  as calculated from the following:    Height as of this encounter: 1.549 m (5' 1\").    Weight as of this encounter: 62.4 kg (137 lb 9.6 oz).        She reports that she has never smoked. She has never used smokeless tobacco.      Appropriate preventive services were discussed with this patient, including applicable screening as appropriate for cardiovascular disease, diabetes, osteopenia/osteoporosis, and glaucoma.  As appropriate for age/gender, discussed screening for colorectal cancer, prostate cancer, breast cancer, and cervical cancer. Checklist reviewing preventive services available has been given to the patient.    Reviewed patients plan of care and provided an AVS. The Basic Care Plan (routine screening as documented in Health Maintenance) for Sophia meets the Care Plan requirement. This Care Plan has been established and reviewed with the Patient.    Counseling Resources:  ATP IV Guidelines  Pooled Cohorts Equation Calculator  Breast Cancer Risk Calculator  Breast Cancer: Medication to Reduce Risk  FRAX Risk Assessment  ICSI Preventive Guidelines  Dietary " Guidelines for Americans, 2010  USDA's MyPlate  ASA Prophylaxis  Lung CA Screening    Monica Sultana MD  [facility]    Identified Health Risks:

## 2022-07-20 NOTE — NURSING NOTE
"/78   Pulse 80   Temp (!) 96.7  F (35.9  C) (Tympanic)   Resp 14   Ht 1.549 m (5' 1\")   Wt 62.4 kg (137 lb 9.6 oz)   SpO2 97%   BMI 26.00 kg/m    Patient in for Medicare Visit.  "

## 2022-07-21 LAB
ALBUMIN SERPL-MCNC: 3.6 G/DL (ref 3.4–5)
ALP SERPL-CCNC: 65 U/L (ref 40–150)
ALT SERPL W P-5'-P-CCNC: 21 U/L (ref 0–50)
ANION GAP SERPL CALCULATED.3IONS-SCNC: 7 MMOL/L (ref 3–14)
AST SERPL W P-5'-P-CCNC: 18 U/L (ref 0–45)
BILIRUB SERPL-MCNC: 0.7 MG/DL (ref 0.2–1.3)
BUN SERPL-MCNC: 16 MG/DL (ref 7–30)
CALCIUM SERPL-MCNC: 8.9 MG/DL (ref 8.5–10.1)
CHLORIDE BLD-SCNC: 102 MMOL/L (ref 94–109)
CHOLEST SERPL-MCNC: 158 MG/DL
CO2 SERPL-SCNC: 28 MMOL/L (ref 20–32)
CREAT SERPL-MCNC: 0.59 MG/DL (ref 0.52–1.04)
FASTING STATUS PATIENT QL REPORTED: YES
GFR SERPL CREATININE-BSD FRML MDRD: >90 ML/MIN/1.73M2
GLUCOSE BLD-MCNC: 84 MG/DL (ref 70–99)
HDLC SERPL-MCNC: 66 MG/DL
LDLC SERPL CALC-MCNC: 73 MG/DL
NONHDLC SERPL-MCNC: 92 MG/DL
POTASSIUM BLD-SCNC: 3.6 MMOL/L (ref 3.4–5.3)
PROT SERPL-MCNC: 7 G/DL (ref 6.8–8.8)
SODIUM SERPL-SCNC: 137 MMOL/L (ref 133–144)
TRIGL SERPL-MCNC: 97 MG/DL
TSH SERPL DL<=0.005 MIU/L-ACNC: 0.98 MU/L (ref 0.4–4)

## 2022-08-10 ENCOUNTER — APPOINTMENT (OUTPATIENT)
Dept: URBAN - METROPOLITAN AREA CLINIC 253 | Age: 82
Setting detail: DERMATOLOGY
End: 2022-08-13

## 2022-08-10 VITALS — HEIGHT: 61 IN | WEIGHT: 135 LBS

## 2022-08-10 DIAGNOSIS — R60.0 LOCALIZED EDEMA: ICD-10-CM

## 2022-08-10 DIAGNOSIS — D22 MELANOCYTIC NEVI: ICD-10-CM

## 2022-08-10 DIAGNOSIS — Z71.89 OTHER SPECIFIED COUNSELING: ICD-10-CM

## 2022-08-10 DIAGNOSIS — L82.1 OTHER SEBORRHEIC KERATOSIS: ICD-10-CM

## 2022-08-10 DIAGNOSIS — L85.3 XEROSIS CUTIS: ICD-10-CM

## 2022-08-10 DIAGNOSIS — L82.0 INFLAMED SEBORRHEIC KERATOSIS: ICD-10-CM

## 2022-08-10 DIAGNOSIS — L81.4 OTHER MELANIN HYPERPIGMENTATION: ICD-10-CM

## 2022-08-10 DIAGNOSIS — D18.0 HEMANGIOMA: ICD-10-CM

## 2022-08-10 PROBLEM — D18.01 HEMANGIOMA OF SKIN AND SUBCUTANEOUS TISSUE: Status: ACTIVE | Noted: 2022-08-10

## 2022-08-10 PROBLEM — D22.5 MELANOCYTIC NEVI OF TRUNK: Status: ACTIVE | Noted: 2022-08-10

## 2022-08-10 PROCEDURE — OTHER ADDITIONAL NOTES: OTHER

## 2022-08-10 PROCEDURE — 17110 DESTRUCT B9 LESION 1-14: CPT

## 2022-08-10 PROCEDURE — OTHER COUNSELING: OTHER

## 2022-08-10 PROCEDURE — 99213 OFFICE O/P EST LOW 20 MIN: CPT | Mod: 25

## 2022-08-10 PROCEDURE — OTHER LIQUID NITROGEN: OTHER

## 2022-08-10 ASSESSMENT — LOCATION DETAILED DESCRIPTION DERM
LOCATION DETAILED: LEFT MID-UPPER BACK
LOCATION DETAILED: RIGHT DISTAL PRETIBIAL REGION
LOCATION DETAILED: RIGHT MID-UPPER BACK
LOCATION DETAILED: INFERIOR THORACIC SPINE
LOCATION DETAILED: LEFT DISTAL PRETIBIAL REGION
LOCATION DETAILED: RIGHT MEDIAL UPPER BACK

## 2022-08-10 ASSESSMENT — LOCATION SIMPLE DESCRIPTION DERM
LOCATION SIMPLE: LEFT PRETIBIAL REGION
LOCATION SIMPLE: LEFT UPPER BACK
LOCATION SIMPLE: RIGHT PRETIBIAL REGION
LOCATION SIMPLE: RIGHT UPPER BACK
LOCATION SIMPLE: UPPER BACK

## 2022-08-10 ASSESSMENT — LOCATION ZONE DERM
LOCATION ZONE: LEG
LOCATION ZONE: TRUNK

## 2022-08-10 NOTE — PROCEDURE: LIQUID NITROGEN
Render Note In Bullet Format When Appropriate: No
Medical Necessity Clause: This procedure was medically necessary because the lesions that were treated were:
Show Topical Anesthesia Variable?: Yes
Duration Of Freeze Thaw-Cycle (Seconds): 2
Spray Paint Text: The liquid nitrogen was applied to the skin utilizing a spray paint frosting technique.
Medical Necessity Information: It is in your best interest to select a reason for this procedure from the list below. All of these items fulfill various CMS LCD requirements except the new and changing color options.
Detail Level: Zone
Consent: The patient's consent was obtained including but not limited to risks of crusting, scabbing, blistering, scarring, darker or lighter pigmentary change, recurrence, incomplete removal and infection.
Post-Care Instructions: I reviewed with the patient in detail post-care instructions. Patient is to wear sunprotection, and avoid picking at any of the treated lesions. Pt may apply Vaseline to crusted or scabbing areas.
Number Of Freeze-Thaw Cycles: 3 freeze-thaw cycles

## 2022-08-10 NOTE — PROCEDURE: ADDITIONAL NOTES
Detail Level: Detailed
Additional Notes: Applied CeraVe cream to pts back and legs - AR
Additional Notes: Discussed risk of stasis dermatitis
Render Risk Assessment In Note?: no

## 2022-08-10 NOTE — HPI: SKIN LESION
What Type Of Note Output Would You Prefer (Optional)?: Standard Output
Has Your Skin Lesion Been Treated?: not been treated
Is This A New Presentation, Or A Follow-Up?: Growth
Additional History: Rubbing on clothing.

## 2022-08-12 ENCOUNTER — TRANSFERRED RECORDS (OUTPATIENT)
Dept: MULTI SPECIALTY CLINIC | Facility: CLINIC | Age: 82
End: 2022-08-12

## 2022-08-22 ENCOUNTER — TELEPHONE (OUTPATIENT)
Dept: INTERNAL MEDICINE | Facility: CLINIC | Age: 82
End: 2022-08-22

## 2022-08-22 ENCOUNTER — HOSPITAL ENCOUNTER (EMERGENCY)
Facility: CLINIC | Age: 82
Discharge: HOME | End: 2022-08-22
Attending: EMERGENCY MEDICINE | Admitting: EMERGENCY MEDICINE
Payer: COMMERCIAL

## 2022-08-22 ENCOUNTER — APPOINTMENT (OUTPATIENT)
Dept: ULTRASOUND IMAGING | Facility: CLINIC | Age: 82
End: 2022-08-22
Attending: EMERGENCY MEDICINE
Payer: COMMERCIAL

## 2022-08-22 VITALS
RESPIRATION RATE: 20 BRPM | DIASTOLIC BLOOD PRESSURE: 76 MMHG | TEMPERATURE: 98.3 F | OXYGEN SATURATION: 96 % | HEART RATE: 81 BPM | SYSTOLIC BLOOD PRESSURE: 148 MMHG

## 2022-08-22 DIAGNOSIS — I89.0 LYMPHEDEMA: Primary | ICD-10-CM

## 2022-08-22 DIAGNOSIS — M79.661 RIGHT CALF PAIN: ICD-10-CM

## 2022-08-22 DIAGNOSIS — I89.0 LYMPHEDEMA: ICD-10-CM

## 2022-08-22 PROCEDURE — 93971 EXTREMITY STUDY: CPT | Mod: RT

## 2022-08-22 PROCEDURE — 99284 EMERGENCY DEPT VISIT MOD MDM: CPT | Mod: 25

## 2022-08-22 ASSESSMENT — ACTIVITIES OF DAILY LIVING (ADL): ADLS_ACUITY_SCORE: 35

## 2022-08-22 NOTE — ED PROVIDER NOTES
History     Chief Complaint:    Leg Pain      HPI   Sophia Olivarez is a 81 year old female history of lymphedema to the right lower extremity for several years who presents for evaluation of calf pain and tingling to the plantar aspect of the foot.  The patient has difficulty with the leg including persistent edema and occasional pain chronic basis.  She does use compression stockings with little improvement in symptoms last night, the patient had new, increasing right calf pain as well as tingling to the plantar aspect of the foot.  She denies worse swelling, redness, injury,. Chest pain, shortness of breath, or any other concerns.        Review of Systems  MSK: Positive for above  Neuro: Positive for above  All other systems reviewed negative    Allergies:  Aspirin  Atorvastatin Calcium  Codeine Sulfate  Epidural Tray  Magnesium Citrate  Meperidine Hcl  Questran [Cholestyramine]      Medications:    Ascorbic Acid (VITAMIN C CR) 1000 MG TBCR  Cholecalciferol (VITAMIN D3 PO)  diphenoxylate-atropine (LOMOTIL) 2.5-0.025 MG tablet  fluticasone (FLONASE) 50 MCG/ACT nasal spray  hydrochlorothiazide (HYDRODIURIL) 25 MG tablet  IBANdronate (BONIVA) 150 MG tablet  lisinopril (ZESTRIL) 5 MG tablet  loperamide (IMODIUM A-D) 2 MG tablet  Multiple Vitamins-Minerals (MULTIVITAMIN & MINERAL PO)  omega-3 fatty acids 1200 MG capsule  saccharomyces boulardii (FLORASTOR) 250 MG capsule  simvastatin (ZOCOR) 10 MG tablet  vitamin B-12 (CYANOCOBALAMIN) 2000 MCG tablet        Past Medical History:    Past Medical History:   Diagnosis Date     Benign neoplasm of skin, site unspecified      Calculus of kidney      Chronic subinvolution of uterus      Disorder of bone and cartilage, unspecified      Dyspepsia and other specified disorders of function of stomach      Generalized osteoarthrosis, unspecified site      H/O thyroid nodule      Hematuria      HTN (hypertension)      Malignant neoplasm of corpus uteri, except isthmus (H)       Myalgia and myositis, unspecified      NONSPECIFIC MEDICAL HISTORY      Nontoxic multinodular goiter      Other and unspecified hyperlipidemia           Past Surgical History:    Past Surgical History:   Procedure Laterality Date     ABDOMEN SURGERY  2005    uterine cancer Hysterectiomy     BIOPSY      many times from nodules from thyroid     CHOLECYSTECTOMY  1992     GYN SURGERY  2005    uterine cancer     HEAD & NECK SURGERY      right thyroid taken out     [facility] NONSPECIFIC PROCEDURE  1992    Cholecystectomy. abstracted 6/24/02.     [facility] NONSPECIFIC PROCEDURE      Thyroidectomy. abstracted 6/24/02.     [facility] NONSPECIFIC PROCEDURE      Dilatation & Curettage X 2. abstracted 6/24/02.     [facility] NONSPECIFIC PROCEDURE      Cystoscopy. abstracted 6/24/02.     [facility] NONSPECIFIC PROCEDURE      hysterctomy for uterine cancer.     [facility] COLONOSCOPY THRU STOMA, DIAGNOSTIC  10/2007    due q 5 yrs       Family History:    Family History   Problem Relation Age of Onset     Family History Negative Mother      Other Cancer Mother      Osteoporosis Mother      Family History Negative Father      Other Cancer Father      Neurologic Disorder Sister         MS     Diabetes Brother         also, colon CA, colon surgery     Diabetes Sister      Hypertension Sister      Hyperlipidemia Sister      Anxiety Disorder Sister      Mental Illness Sister         bi polar     Obesity Sister         from some pills too     Diabetes Brother      Colon Cancer Brother      Other Cancer Brother      Anxiety Disorder Brother      Mental Illness Brother         schitzophenia     Obesity Brother         from pills too       Social History:  Presents with SO    Physical Exam     Patient Vitals for the past 24 hrs:   BP Temp Temp src Pulse Resp SpO2   08/22/22 0646 (!) 163/82 98.3  F (36.8  C) Oral 90 20 95 %       Physical Exam  Constitutional: Alert, attentive  CV: 2+ DP and PT pulses, brisk distal cap refill  MSK: Mild asymmetric swelling to the right calf and  ankle.  No asymmetric erythema, warmth, or skin findings.  Vague tenderness in the right calf, but no pain out of proportion to exam and soft compartments throughout.  Neurological: 5/5 strength to the DF, PF, EHL and FHL motor functions; sensation intact to the DP, SP, T, S and S distributions  Skin: Skin is warm and dry.      Emergency Department Course     Results for orders placed or performed during the hospital encounter of 08/22/22 (from the past 24 hour(s))   US Lower Extremity Venous Duplex Right    Narrative    US LOWER EXTREMITY VENOUS DUPLEX RIGHT 8/22/2022 8:09 AM    CLINICAL HISTORY: Right leg pain, swelling, evaluate for deep venous  thrombosis.     TECHNIQUE: Venous Duplex ultrasound of the right lower extremity with  and without compression, augmentation and duplex. Color flow and  spectral Doppler with waveform analysis performed.    COMPARISON: Venous Doppler ultrasound right lower extremity dated  7/9/2020.    FINDINGS: Exam includes the common femoral, femoral, popliteal, and  contralateral common femoral veins as well as segmentally visualized  deep calf veins and greater saphenous vein.     RIGHT: No deep vein thrombosis. No superficial thrombophlebitis. No  popliteal cyst.      Impression    IMPRESSION: No deep venous thrombosis in the right lower extremity.        Emergency Department Course:             Reviewed:    I reviewed nursing notes, vitals and past medical history    Assessments:   I obtained history and examined the patient as noted above.    I rechecked the patient and explained findings.     Disposition:  The patient was discharged to home.     Impression & Plan      Medical Decision Making:  This is a 81-year-old female with history of lymphedema to the right lower extremity for several years who presents for evaluation of calf pain and paresthesias to the plantar aspect of the foot.  There is no asymmetric warmth, erythema, or other findings to suggest cellulitis.  No low back  pain to suggest sciatica but discussed this could represent radicular phenomenon.  Compartments are soft and she has minimal pain.  Ultrasound shows no evidence of DVT.  Plan repeat ultrasound in 1 week if symptoms persist, primary care follow-up in 3 to 5 days, and return precautions for worse pain, swelling, rash, or any other concerns.    Covid-19  Sophia Olivarez was evaluated during a global COVID-19 pandemic, which necessitated consideration that the patient might be at risk for infection with the SARS-CoV-2 virus that causes COVID-19.   Applicable protocols for evaluation were followed during the patient's care.   COVID-19 was considered as part of the patient's evaluation.    Diagnosis:    ICD-10-CM    1. Right calf pain  M79.661    2. Lymphedema  I89.0           Gregory Adams MD  08/22/22 0930

## 2022-08-22 NOTE — TELEPHONE ENCOUNTER
Patient calls after being in the ED too request a referral to a [facility]. Patient is also asking if Lasix would work better for her than the hydrochlorothiazide?  Please address and advise.

## 2022-08-22 NOTE — ED TRIAGE NOTES
Pt states RLE pain and swelling this morning. Pt expresses concern for DVT. ABCs intact GCS 15     Triage Assessment     Row Name 08/22/22 0647       Triage Assessment (Adult)    Airway WDL WDL       Respiratory WDL    Respiratory WDL WDL       Skin Circulation/Temperature WDL    Skin Circulation/Temperature WDL WDL       Cardiac WDL    Cardiac WDL WDL       Peripheral/Neurovascular WDL    Peripheral Neurovascular WDL WDL       Cognitive/Neuro/Behavioral WDL    Cognitive/Neuro/Behavioral WDL WDL

## 2022-08-24 NOTE — TELEPHONE ENCOUNTER
Patient advised of lymphedema referral and to continue hydrochlorothiazide.  VIRGEN Banegas R.N.

## 2022-08-30 ENCOUNTER — HOSPITAL ENCOUNTER (OUTPATIENT)
Dept: OCCUPATIONAL THERAPY | Facility: CLINIC | Age: 82
Discharge: HOME | End: 2022-08-30
Attending: INTERNAL MEDICINE
Payer: COMMERCIAL

## 2022-08-30 DIAGNOSIS — I89.0 LYMPHEDEMA: ICD-10-CM

## 2022-08-30 PROCEDURE — 97535 SELF CARE MNGMENT TRAINING: CPT | Mod: GO | Performed by: OCCUPATIONAL THERAPIST

## 2022-08-30 PROCEDURE — 97165 OT EVAL LOW COMPLEX 30 MIN: CPT | Mod: GO | Performed by: OCCUPATIONAL THERAPIST

## 2022-09-06 ENCOUNTER — HOSPITAL ENCOUNTER (OUTPATIENT)
Dept: OCCUPATIONAL THERAPY | Facility: CLINIC | Age: 82
Discharge: HOME | End: 2022-09-06
Attending: INTERNAL MEDICINE
Payer: COMMERCIAL

## 2022-09-06 DIAGNOSIS — I89.0 LYMPHEDEMA: Primary | ICD-10-CM

## 2022-09-06 PROCEDURE — 97535 SELF CARE MNGMENT TRAINING: CPT | Mod: GO | Performed by: OCCUPATIONAL THERAPIST

## 2022-09-28 ENCOUNTER — TELEPHONE (OUTPATIENT)
Dept: INTERNAL MEDICINE | Facility: CLINIC | Age: 82
End: 2022-09-28

## 2022-09-28 NOTE — TELEPHONE ENCOUNTER
Patient calls to make appointment but also asks a questions.   She received the high does flu vacc 9- at 5pm. She said she is getting a sharp pain in the L side of her head. It comes and goes quickly. She is asking if she can take tylenol? She said she has heard taking this can  decrease the effectiveness of the vaccine.    Best number to call back [phone]

## 2022-09-28 NOTE — TELEPHONE ENCOUNTER
Patient can take Tylenol for headaches but if she is having sharp pains on her left side of her head she needs to be evaluated in urgent care or ER to rule out temporal arteritis.  Please advise patient

## 2022-10-03 ENCOUNTER — HOSPITAL ENCOUNTER (OUTPATIENT)
Dept: OCCUPATIONAL THERAPY | Facility: CLINIC | Age: 82
Discharge: HOME | End: 2022-10-03
Payer: COMMERCIAL

## 2022-10-03 DIAGNOSIS — I89.0 LYMPHEDEMA: Primary | ICD-10-CM

## 2022-10-03 PROCEDURE — 97535 SELF CARE MNGMENT TRAINING: CPT | Mod: GO | Performed by: OCCUPATIONAL THERAPIST

## 2022-10-04 ENCOUNTER — VIRTUAL VISIT (OUTPATIENT)
Dept: INTERNAL MEDICINE | Facility: CLINIC | Age: 82
End: 2022-10-04
Payer: COMMERCIAL

## 2022-10-04 DIAGNOSIS — K58.0 IRRITABLE BOWEL SYNDROME WITH DIARRHEA: Primary | ICD-10-CM

## 2022-10-04 DIAGNOSIS — C55 MALIGNANT NEOPLASM OF UTERUS, UNSPECIFIED SITE (H): ICD-10-CM

## 2022-10-04 PROCEDURE — 99213 OFFICE O/P EST LOW 20 MIN: CPT | Mod: 95 | Performed by: INTERNAL MEDICINE

## 2022-10-04 NOTE — PROGRESS NOTES
Vashti is a 81 year old who is being evaluated via a billable video visit.      How would you like to obtain your AVS? MyChart  If the video visit is dropped, the invitation should be resent by: Text to cell phone: [phone]  Will anyone else be joining your video visit? No          Assessment & Plan     (K58.0) Irritable bowel syndrome with diarrhea  (primary encounter diagnosis)  Plan:explained about IBS, advised to avoid foods that flares up symptoms, ok to take OTC Imodium or lomotil as directed.explained clearly about the medication,insructions and side effects.  Will refer to Gastroenterologist if symptoms worsens.      (C55) Malignant neoplasm of uterus, unspecified site (H)  Plan: S/p hysterectomy bilateral salpingo-oophorectomy and pelvic node       No follow-ups on file.    Monica Sultana MD  [facility]   Vashti is a 81 year old, presenting for the following health issues:  Consult (IBS)      HPI     Pt is a 81 year old female who presents for virtual visit today to discuss IBS symptoms. Pt has on and off explosive diarrhea with regular BM.  Diarrhea happens 2-3 x month, and pt takes imodium 1-2 x month and lomotil 1-2 times a month when she travels . Pt would like to know if she can continue this regime.  Last colonoscopy 2019.    Past Medical History:   Diagnosis Date     Benign neoplasm of skin, site unspecified     sees derm.     Calculus of kidney     3 passed spont.     Chronic subinvolution of uterus      Disorder of bone and cartilage, unspecified     osteopenia     Dyspepsia and other specified disorders of function of stomach     dyspepsia     Generalized osteoarthrosis, unspecified site      H/O thyroid nodule     f/u with endocrinologist     Hematuria     microscopic     HTN (hypertension)      Malignant neoplasm of corpus uteri, except isthmus (H)     surgery and radiation rx.stage IC, grade 3 endometrial carcinoma.     Myalgia and myositis,  unspecified      NONSPECIFIC MEDICAL HISTORY     radiation induced diarrhea- takes Lomotil prn per oncologist.      Nontoxic multinodular goiter     sees endocrine     Other and unspecified hyperlipidemia        Current Outpatient Medications   Medication Sig Dispense Refill     Ascorbic Acid (VITAMIN C CR) 1000 MG TBCR        Cholecalciferol (VITAMIN D3 PO) Take 2,000 Units by mouth daily       diphenoxylate-atropine (LOMOTIL) 2.5-0.025 MG tablet Take 1 tablet by mouth 3 times daily as needed for diarrhea 90 tablet 1     fluticasone (FLONASE) 50 MCG/ACT nasal spray INSTILL 2 SPRAYS INTO BOTH NOSTRILS DAILY 16 g 3     hydrochlorothiazide (HYDRODIURIL) 25 MG tablet TAKE 1 TABLET (25 MG) BY MOUTH DAILY 90 tablet 1     IBANdronate (BONIVA) 150 MG tablet Take 1 tablet (150 mg) by mouth every 30 days 3 tablet 3     lisinopril (ZESTRIL) 5 MG tablet Take 1 tablet (5 mg) by mouth daily 90 tablet 1     loperamide (IMODIUM A-D) 2 MG tablet Take 2 mg by mouth as needed for diarrhea       Multiple Vitamins-Minerals (MULTIVITAMIN & MINERAL PO) Take 1 tablet by mouth daily       omega-3 fatty acids 1200 MG capsule Take 1 capsule by mouth daily 90 capsule      saccharomyces boulardii (FLORASTOR) 250 MG capsule Take 250 mg by mouth 2 times daily       simvastatin (ZOCOR) 10 MG tablet Take 1 tablet (10 mg) by mouth At Bedtime 90 tablet 3     vitamin B-12 (CYANOCOBALAMIN) 2000 MCG tablet             Review of Systems   CONSTITUTIONAL: NEGATIVE for fever, chills, change in weight  GI: IBS      Objective           Vitals:  No vitals were obtained today due to virtual visit.    Physical Exam   GENERAL:  , alert and no distress  RESP: No audible wheeze, cough, or visible cyanosis.  No visible retractions or increased work of breathing.    PSYCH: Mentation appears normal, affect normal/bright, judgement and insight intact, normal speech and appearance well-groomed.       Video-Visit Details    Video Start Time: 2:41 PM    Type of service:   Video Visit    Video End Time:2:51 PM    Originating Location (pt. Location): Home    Distant Location (provider location):  [facility]     Platform used for Video Visit: Kenia

## 2022-12-19 DIAGNOSIS — I10 ESSENTIAL HYPERTENSION: ICD-10-CM

## 2022-12-20 NOTE — TELEPHONE ENCOUNTER
Routing refill request to provider for review/approval because:  BP Readings from Last 3 Encounters:   08/22/22 (!) 148/76   07/20/22 132/78   12/28/21 136/73      Magui Zhong RN

## 2022-12-21 RX ORDER — HYDROCHLOROTHIAZIDE 25 MG/1
TABLET ORAL
Qty: 90 TABLET | Refills: 0 | Status: SHIPPED | OUTPATIENT
Start: 2022-12-21 | End: 2023-01-12

## 2022-12-21 RX ORDER — LISINOPRIL 5 MG/1
5 TABLET ORAL DAILY
Qty: 90 TABLET | Refills: 0 | Status: SHIPPED | OUTPATIENT
Start: 2022-12-21 | End: 2023-01-12

## 2022-12-21 NOTE — TELEPHONE ENCOUNTER
One-time refill done, patient needs appointment to see me in clinic next month and also needs labs, last seen July 2022, please advise patient

## 2022-12-30 ENCOUNTER — TRANSFERRED RECORDS (OUTPATIENT)
Dept: INTERNAL MEDICINE | Facility: CLINIC | Age: 82
End: 2022-12-30
Payer: COMMERCIAL

## 2022-12-30 LAB
CREATININE (EXTERNAL): 0.49 MG/DL (ref 0.57–1)
GFR ESTIMATED (EXTERNAL): 95 ML/MIN/1.7
GLUCOSE (EXTERNAL): 89 MG/DL (ref 70–99)
HBA1C MFR BLD: 5.8 % (ref 4.8–5.6)
POTASSIUM (EXTERNAL): 4 MMOL/L (ref 3.5–5.2)
TSH SERPL-ACNC: 0.83 UIU/ML (ref 0.45–4.5)

## 2023-01-12 ENCOUNTER — OFFICE VISIT (OUTPATIENT)
Dept: INTERNAL MEDICINE | Facility: CLINIC | Age: 83
End: 2023-01-12
Payer: COMMERCIAL

## 2023-01-12 VITALS
DIASTOLIC BLOOD PRESSURE: 82 MMHG | HEIGHT: 61 IN | SYSTOLIC BLOOD PRESSURE: 128 MMHG | TEMPERATURE: 96.3 F | BODY MASS INDEX: 25.66 KG/M2 | HEART RATE: 95 BPM | OXYGEN SATURATION: 96 % | RESPIRATION RATE: 13 BRPM | WEIGHT: 135.9 LBS

## 2023-01-12 DIAGNOSIS — C55 MALIGNANT NEOPLASM OF UTERUS, UNSPECIFIED SITE (H): ICD-10-CM

## 2023-01-12 DIAGNOSIS — I10 ESSENTIAL HYPERTENSION: ICD-10-CM

## 2023-01-12 DIAGNOSIS — E04.2 MULTIPLE THYROID NODULES: ICD-10-CM

## 2023-01-12 DIAGNOSIS — M81.0 OSTEOPOROSIS, UNSPECIFIED OSTEOPOROSIS TYPE, UNSPECIFIED PATHOLOGICAL FRACTURE PRESENCE: ICD-10-CM

## 2023-01-12 DIAGNOSIS — R73.03 PREDIABETES: ICD-10-CM

## 2023-01-12 DIAGNOSIS — E78.2 MIXED HYPERLIPIDEMIA: Primary | ICD-10-CM

## 2023-01-12 PROCEDURE — 99214 OFFICE O/P EST MOD 30 MIN: CPT | Performed by: INTERNAL MEDICINE

## 2023-01-12 RX ORDER — LISINOPRIL 5 MG/1
5 TABLET ORAL DAILY
Qty: 90 TABLET | Refills: 1 | Status: SHIPPED | OUTPATIENT
Start: 2023-01-12 | End: 2023-07-24

## 2023-01-12 RX ORDER — HYDROCHLOROTHIAZIDE 25 MG/1
25 TABLET ORAL DAILY
Qty: 90 TABLET | Refills: 1 | Status: SHIPPED | OUTPATIENT
Start: 2023-01-12 | End: 2023-07-24

## 2023-01-12 NOTE — PROGRESS NOTES
"  Assessment & Plan     (I10) Essential hypertension  Comment: BP well controlled   Plan: lisinopril (ZESTRIL) 5 MG tablet, hydrochlorothiazide (HYDRODIURIL) 25 MG tablet refilled as directed.explained clearly about the medication,insructions and side effects.       (E78.2) Mixed hyperlipidemia    Plan: on crestor 10 mg daily , lipids due 07/23     (M81.0) Osteoporosis, unspecified osteoporosis type, unspecified pathological fracture presence  Plan: on boniva     (E04.2) Multiple thyroid nodules  Plan: follows up with endocrinologist    (C55) Malignant neoplasm of uterus, unspecified site (H)  Plan: s/p hysterectomy / LE lymphedema         (R73.03) Prediabetes  Plan: Advised to follow ADA diet and regular exercise repeat A1c in 6 months    Elevated B12 level-labs done through endocrinologist, patient has stopped taking B12 supplements repeat B12 level in 1 to 2 months    Review of prior external note(s) from - Outside records from endocrinologist labs   Prescription drug management     BMI:   Estimated body mass index is 25.68 kg/m  as calculated from the following:    Height as of this encounter: 1.549 m (5' 1\").    Weight as of this encounter: 61.6 kg (135 lb 14.4 oz).       Return in about 6 months (around 7/24/2023) for Physical Exam.    Monica Sultana MD  [facility] RAMÍREZ Kingston is a 82 year old presenting for the following health issues:  Lipids and Hypertension      History of Present Illness       Reason for visit:  Dr cunningham re meds that I am taking and what to take    She eats 2-3 servings of fruits and vegetables daily.She consumes 0 sweetened beverage(s) daily.She exercises with enough effort to increase her heart rate 30 to 60 minutes per day.  She exercises with enough effort to increase her heart rate 5 days per week.   She is taking medications regularly.       Hyperlipidemia Follow-Up      Are you regularly taking any medication or supplement to lower " your cholesterol?   Yes- statin    Are you having muscle aches or other side effects that you think could be caused by your cholesterol lowering medication?  No    Hypertension Follow-up      Do you check your blood pressure regularly outside of the clinic? No     Are you following a low salt diet? Yes    Are your blood pressures ever more than 140 on the top number (systolic) OR more   than 90 on the bottom number (diastolic), for example 140/90? No      History of multiple thyroid nodules; follows up with endocrinologist and had recent labs done through her endocrinologist 12/22/22- A1c of 5.8  And Vit B12 1340. Pt has stopped taking B12     History of osteoporosis: On Boniva    Follows up with gastroenterologist for GI issues/IBS and has appointment on 2/7/2023           Past Medical History:   Diagnosis Date     Benign neoplasm of skin, site unspecified     sees derm.     Calculus of kidney     3 passed spont.     Chronic subinvolution of uterus      Disorder of bone and cartilage, unspecified     osteopenia     Dyspepsia and other specified disorders of function of stomach     dyspepsia     Generalized osteoarthrosis, unspecified site      H/O thyroid nodule     f/u with endocrinologist     Hematuria     microscopic     HTN (hypertension)      Malignant neoplasm of corpus uteri, except isthmus (H)     surgery and radiation rx.stage IC, grade 3 endometrial carcinoma.     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      Nontoxic multinodular goiter     sees endocrine     Other and unspecified hyperlipidemia        Current Outpatient Medications   Medication Sig Dispense Refill     Ascorbic Acid (VITAMIN C CR) 1000 MG TBCR        Cholecalciferol (VITAMIN D3 PO) Take 2,000 Units by mouth daily       diphenoxylate-atropine (LOMOTIL) 2.5-0.025 MG tablet Take 1 tablet by mouth 3 times daily as needed for diarrhea 90 tablet 1      "fluticasone (FLONASE) 50 MCG/ACT nasal spray INSTILL 2 SPRAYS INTO BOTH NOSTRILS DAILY 16 g 3     hydrochlorothiazide (HYDRODIURIL) 25 MG tablet Take 1 tablet (25 mg) by mouth daily 90 tablet 1     IBANdronate (BONIVA) 150 MG tablet Take 1 tablet (150 mg) by mouth every 30 days 3 tablet 3     lisinopril (ZESTRIL) 5 MG tablet Take 1 tablet (5 mg) by mouth daily 90 tablet 1     Multiple Vitamins-Minerals (MULTIVITAMIN & MINERAL PO) Take 1 tablet by mouth daily       omega-3 fatty acids 1200 MG capsule Take 1 capsule by mouth daily 90 capsule      saccharomyces boulardii (FLORASTOR) 250 MG capsule Take 250 mg by mouth 2 times daily       simvastatin (ZOCOR) 10 MG tablet Take 1 tablet (10 mg) by mouth At Bedtime 90 tablet 3     vitamin B-12 (CYANOCOBALAMIN) 2000 MCG tablet        loperamide (IMODIUM A-D) 2 MG tablet Take 2 mg by mouth as needed for diarrhea (Patient not taking: Reported on 1/12/2023)           Review of Systems   CONSTITUTIONAL: NEGATIVE for fever, chills, change in weight  RESP: NEGATIVE for significant cough or SOB  CV: NEGATIVE for chest pain, palpitations or peripheral edema  MUSCULOSKELETAL: NEGATIVE for significant arthralgias or myalgia  PSYCHIATRIC: NEGATIVE for changes in mood or affect      Objective    /82   Pulse 95   Temp (!) 96.3  F (35.7  C) (Tympanic)   Resp 13   Ht 1.549 m (5' 1\")   Wt 61.6 kg (135 lb 14.4 oz)   SpO2 96%   BMI 25.68 kg/m    Body mass index is 25.68 kg/m .  Physical Exam   GENERAL:   alert and no distress  EYES: Eyes grossly normal to inspection, PERRL and conjunctivae and sclerae normal  RESP: lungs clear to auscultation - no rales, rhonchi or wheezes  CV: regular rate and rhythm, normal S1 S2,    MS: no calf tenderness chucky   PSYCH: mentation appears normal, affect normal/bright        Please abstract the following data from this visit with this patient into the appropriate field in Epic:   Mammogram done on this date: 08/12/22  (approximately), by this " group: [facility], results were normal per pt .

## 2023-01-12 NOTE — Clinical Note
Please abstract the following data from this visit with this patient into the appropriate field in Epic:  Mammogram done on this date: 08/12/22  (approximately), by this group: [facility], results were normal per pt .

## 2023-01-12 NOTE — NURSING NOTE
"/82   Pulse 95   Temp (!) 96.3  F (35.7  C) (Tympanic)   Resp 13   Ht 1.549 m (5' 1\")   Wt 61.6 kg (135 lb 14.4 oz)   SpO2 96%   BMI 25.68 kg/m    Patient in for medication follow up.  "

## 2023-02-07 ENCOUNTER — TRANSFERRED RECORDS (OUTPATIENT)
Dept: HEALTH INFORMATION MANAGEMENT | Facility: CLINIC | Age: 83
End: 2023-02-07
Payer: COMMERCIAL

## 2023-03-16 ENCOUNTER — APPOINTMENT (OUTPATIENT)
Dept: URBAN - METROPOLITAN AREA CLINIC 253 | Age: 83
Setting detail: DERMATOLOGY
End: 2023-03-22

## 2023-03-16 VITALS — WEIGHT: 293 LBS | RESPIRATION RATE: 14 BRPM | HEIGHT: 60 IN

## 2023-03-16 DIAGNOSIS — L81.4 OTHER MELANIN HYPERPIGMENTATION: ICD-10-CM

## 2023-03-16 DIAGNOSIS — Z71.89 OTHER SPECIFIED COUNSELING: ICD-10-CM

## 2023-03-16 DIAGNOSIS — L82.1 OTHER SEBORRHEIC KERATOSIS: ICD-10-CM

## 2023-03-16 DIAGNOSIS — D18.0 HEMANGIOMA: ICD-10-CM

## 2023-03-16 DIAGNOSIS — Z87.2 PERSONAL HISTORY OF DISEASES OF THE SKIN AND SUBCUTANEOUS TISSUE: ICD-10-CM

## 2023-03-16 DIAGNOSIS — D22 MELANOCYTIC NEVI: ICD-10-CM

## 2023-03-16 DIAGNOSIS — L82.0 INFLAMED SEBORRHEIC KERATOSIS: ICD-10-CM

## 2023-03-16 PROBLEM — D22.5 MELANOCYTIC NEVI OF TRUNK: Status: ACTIVE | Noted: 2023-03-16

## 2023-03-16 PROBLEM — D18.01 HEMANGIOMA OF SKIN AND SUBCUTANEOUS TISSUE: Status: ACTIVE | Noted: 2023-03-16

## 2023-03-16 PROCEDURE — OTHER MIPS QUALITY: OTHER

## 2023-03-16 PROCEDURE — OTHER COUNSELING: OTHER

## 2023-03-16 PROCEDURE — 99213 OFFICE O/P EST LOW 20 MIN: CPT

## 2023-03-16 ASSESSMENT — LOCATION DETAILED DESCRIPTION DERM
LOCATION DETAILED: RIGHT ANTERIOR PROXIMAL THIGH
LOCATION DETAILED: LEFT INFERIOR ANTERIOR NECK
LOCATION DETAILED: INFERIOR THORACIC SPINE
LOCATION DETAILED: LEFT INFERIOR LATERAL NECK
LOCATION DETAILED: RIGHT INFERIOR LATERAL NECK

## 2023-03-16 ASSESSMENT — LOCATION ZONE DERM
LOCATION ZONE: LEG
LOCATION ZONE: NECK
LOCATION ZONE: TRUNK

## 2023-03-16 ASSESSMENT — LOCATION SIMPLE DESCRIPTION DERM
LOCATION SIMPLE: RIGHT ANTERIOR NECK
LOCATION SIMPLE: UPPER BACK
LOCATION SIMPLE: LEFT ANTERIOR NECK
LOCATION SIMPLE: RIGHT THIGH

## 2023-03-16 NOTE — PROCEDURE: COUNSELING
Detail Level: Simple
Detail Level: Generalized
Skin Checks Recommendations: Return for skin exam in 6 months
Detail Level: Detailed
Patient Specific Counseling (Will Not Stick From Patient To Patient): Discussed treating with LN2 if they become symptomatic. Discussed scheduling an appt if wanting to treat.

## 2023-03-21 ENCOUNTER — HOSPITAL ENCOUNTER (OUTPATIENT)
Dept: ULTRASOUND IMAGING | Facility: CLINIC | Age: 83
Discharge: HOME | End: 2023-03-21
Attending: INTERNAL MEDICINE | Admitting: INTERNAL MEDICINE
Payer: COMMERCIAL

## 2023-03-21 DIAGNOSIS — E04.2 MULTINODULAR GOITER: ICD-10-CM

## 2023-03-21 PROCEDURE — 76536 US EXAM OF HEAD AND NECK: CPT

## 2023-03-22 ENCOUNTER — TELEPHONE (OUTPATIENT)
Dept: INTERNAL MEDICINE | Facility: CLINIC | Age: 83
End: 2023-03-22
Payer: COMMERCIAL

## 2023-03-22 ENCOUNTER — TRANSFERRED RECORDS (OUTPATIENT)
Dept: HEALTH INFORMATION MANAGEMENT | Facility: CLINIC | Age: 83
End: 2023-03-22
Payer: COMMERCIAL

## 2023-03-22 NOTE — TELEPHONE ENCOUNTER
"Patient dropped off at typed letter stating the following:    \"I would like to know if I need a vaccine or test for Hep C. I felt like passing out in the store yesterday, drank some water and had a little something to eat then got better. Wonder if I can have an EKG or echo or whatever test my insurance did cover before. Dr. Chew end of December said my protein and creatinine are low and pre diabetes (francisca drink protein) wonder if you could get me tested again since end of Dec. For diabetes, etc, and my body around my hips and top of legs are very sore or hurt in most mornings. He now ordered ultrasound for my follow-up for thyroid today. What about Hep C?\"    PCP advised patient to schedule an appointment. Left detailed message per consent.    "

## 2023-03-29 ENCOUNTER — OFFICE VISIT (OUTPATIENT)
Dept: INTERNAL MEDICINE | Facility: CLINIC | Age: 83
End: 2023-03-29
Payer: COMMERCIAL

## 2023-03-29 VITALS
RESPIRATION RATE: 13 BRPM | TEMPERATURE: 96.7 F | SYSTOLIC BLOOD PRESSURE: 122 MMHG | WEIGHT: 135.7 LBS | HEART RATE: 83 BPM | DIASTOLIC BLOOD PRESSURE: 80 MMHG | BODY MASS INDEX: 25.62 KG/M2 | OXYGEN SATURATION: 98 % | HEIGHT: 61 IN

## 2023-03-29 DIAGNOSIS — Z11.59 NEED FOR HEPATITIS C SCREENING TEST: Primary | ICD-10-CM

## 2023-03-29 DIAGNOSIS — R73.03 PREDIABETES: ICD-10-CM

## 2023-03-29 LAB — HBA1C MFR BLD: 5.5 % (ref 0–5.6)

## 2023-03-29 PROCEDURE — 83036 HEMOGLOBIN GLYCOSYLATED A1C: CPT | Performed by: INTERNAL MEDICINE

## 2023-03-29 PROCEDURE — 36415 COLL VENOUS BLD VENIPUNCTURE: CPT | Performed by: INTERNAL MEDICINE

## 2023-03-29 PROCEDURE — 99213 OFFICE O/P EST LOW 20 MIN: CPT | Performed by: INTERNAL MEDICINE

## 2023-03-29 PROCEDURE — 86803 HEPATITIS C AB TEST: CPT | Performed by: INTERNAL MEDICINE

## 2023-03-29 NOTE — NURSING NOTE
"/80   Pulse 83   Temp (!) 96.7  F (35.9  C) (Tympanic)   Resp 13   Ht 1.549 m (5' 1\")   Wt 61.6 kg (135 lb 11.2 oz)   SpO2 98%   BMI 25.64 kg/m      "

## 2023-03-29 NOTE — PROGRESS NOTES
Assessment & Plan     (Z11.59) Need for hepatitis C screening test     Plan: Hepatitis C Screen Reflex to HCV RNA Quant and         Genotype           (R73.03) Prediabetes  Plan: Hemoglobin A1c.pt was told I will contact her after results and proceed accordingly.             Review of the result(s) of each unique test - A1c  22 minutes spent by me on the date of the encounter doing chart review, history and exam, documentation and further activities per the note  {     Monica Sultana MD  [facility] 82 [facility], presenting for the following health issues:  Consult (Pain in legs/hips in morning, ) and Follow Up (Labs)     History of Present Illness       Reason for visit:  Follow up with blood work check other questions    She eats 2-3 servings of fruits and vegetables daily.She consumes 0 sweetened beverage(s) daily.She exercises with enough effort to increase her heart rate 30 to 60 minutes per day.  She exercises with enough effort to increase her heart rate 6 days per week.   She is taking medications regularly.     Pt is a 82 year old female who is seen here to day with the complaints of requesting hepatitis C test done today.  Patient states her 's hepatitis C test came back positive but the repeat test was negative.  Patient is also here to recheck on her prediabetes, patient was seen by her endocrinologist in December 2022 and had A1c of 5.8 consistent with prediabetes and patient would like to recheck.    Patient also states that she felt like passing out in the store last week but drank some water and had a little something to eat then got better. No more episodes since  Patient also said she had pain in her hips last week when she woke up in the morning but now it has resolved.    Past Medical History:   Diagnosis Date     Benign neoplasm of skin, site unspecified     sees derm.     Calculus of kidney     3 passed spont.     Chronic  subinvolution of uterus      Disorder of bone and cartilage, unspecified     osteopenia     Dyspepsia and other specified disorders of function of stomach     dyspepsia     Generalized osteoarthrosis, unspecified site      H/O thyroid nodule     f/u with endocrinologist     Hematuria     microscopic     HTN (hypertension)      Malignant neoplasm of corpus uteri, except isthmus (H)     surgery and radiation rx.stage IC, grade 3 endometrial carcinoma.     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      Nontoxic multinodular goiter     sees endocrine     Other and unspecified hyperlipidemia        Current Outpatient Medications   Medication Sig Dispense Refill     Ascorbic Acid (VITAMIN C CR) 1000 MG TBCR        Cholecalciferol (VITAMIN D3 PO) Take 2,000 Units by mouth daily       hydrochlorothiazide (HYDRODIURIL) 25 MG tablet Take 1 tablet (25 mg) by mouth daily 90 tablet 1     IBANdronate (BONIVA) 150 MG tablet Take 1 tablet (150 mg) by mouth every 30 days 3 tablet 3     lisinopril (ZESTRIL) 5 MG tablet Take 1 tablet (5 mg) by mouth daily 90 tablet 1     Multiple Vitamins-Minerals (MULTIVITAMIN & MINERAL PO) Take 1 tablet by mouth daily       omega-3 fatty acids 1200 MG capsule Take 1 capsule by mouth daily 90 capsule      saccharomyces boulardii (FLORASTOR) 250 MG capsule Take 250 mg by mouth 2 times daily       simvastatin (ZOCOR) 10 MG tablet Take 1 tablet (10 mg) by mouth At Bedtime 90 tablet 3     diphenoxylate-atropine (LOMOTIL) 2.5-0.025 MG tablet Take 1 tablet by mouth 3 times daily as needed for diarrhea (Patient not taking: Reported on 3/29/2023) 90 tablet 1     fluticasone (FLONASE) 50 MCG/ACT nasal spray INSTILL 2 SPRAYS INTO BOTH NOSTRILS DAILY (Patient not taking: Reported on 3/29/2023) 16 g 3     loperamide (IMODIUM A-D) 2 MG tablet Take 2 mg by mouth as needed for diarrhea (Patient not taking: Reported on  "3/29/2023)       vitamin B-12 (CYANOCOBALAMIN) 2000 MCG tablet  (Patient not taking: Reported on 3/29/2023)            Review of Systems   CONSTITUTIONAL: NEGATIVE for fever, chills, change in weight  RESP: NEGATIVE for significant cough or SOB  CV: NEGATIVE for chest pain, palpitations or peripheral edema  NEURO: NEGATIVE for weakness, dizziness or paresthesias      Objective    /80   Pulse 83   Temp (!) 96.7  F (35.9  C) (Tympanic)   Resp 13   Ht 1.549 m (5' 1\")   Wt 61.6 kg (135 lb 11.2 oz)   SpO2 98%   BMI 25.64 kg/m    Body mass index is 25.64 kg/m .  Physical Exam   GENERAL:  alert and no distress  RESP: lungs clear to auscultation - no rales, rhonchi or wheezes  CV: regular rate and rhythm, normal S1 S2,   NEURO: Normal strength and tone, sensory exam grossly normal, mentation intact and DTR's normal and symmetric     Results for orders placed or performed in visit on 03/29/23 (from the past 24 hour(s))   Hemoglobin A1c   Result Value Ref Range    Hemoglobin A1C 5.5 0.0 - 5.6 %                    "

## 2023-03-30 LAB — HCV AB SERPL QL IA: NONREACTIVE

## 2023-05-17 ENCOUNTER — APPOINTMENT (OUTPATIENT)
Dept: URBAN - METROPOLITAN AREA CLINIC 255 | Age: 83
Setting detail: DERMATOLOGY
End: 2023-05-23

## 2023-05-17 DIAGNOSIS — D485 NEOPLASM OF UNCERTAIN BEHAVIOR OF SKIN: ICD-10-CM

## 2023-05-17 PROBLEM — D48.5 NEOPLASM OF UNCERTAIN BEHAVIOR OF SKIN: Status: ACTIVE | Noted: 2023-05-17

## 2023-05-17 PROCEDURE — OTHER MIPS QUALITY: OTHER

## 2023-05-17 PROCEDURE — 11102 TANGNTL BX SKIN SINGLE LES: CPT

## 2023-05-17 PROCEDURE — OTHER BIOPSY BY SHAVE METHOD: OTHER

## 2023-05-17 PROCEDURE — OTHER COUNSELING: OTHER

## 2023-05-17 PROCEDURE — 99212 OFFICE O/P EST SF 10 MIN: CPT | Mod: 25

## 2023-05-17 PROCEDURE — OTHER PHOTO-DOCUMENTATION: OTHER

## 2023-05-17 PROCEDURE — OTHER SEPARATE AND IDENTIFIABLE DOCUMENTATION: OTHER

## 2023-05-17 ASSESSMENT — LOCATION ZONE DERM: LOCATION ZONE: NECK

## 2023-05-17 ASSESSMENT — LOCATION DETAILED DESCRIPTION DERM: LOCATION DETAILED: LEFT CENTRAL LATERAL NECK

## 2023-05-17 ASSESSMENT — LOCATION SIMPLE DESCRIPTION DERM: LOCATION SIMPLE: NECK

## 2023-05-17 NOTE — PROCEDURE: PHOTO-DOCUMENTATION
Detail Level: Detailed
Photo Preface (Leave Blank If You Do Not Want): Photographs were obtained today in [facility] and by patient

## 2023-05-17 NOTE — PROCEDURE: BIOPSY BY SHAVE METHOD
Billing Type: Third-Party Bill
Was A Bandage Applied: Yes
Biopsy Type: H and E
Validate Triangulation: No
Cryotherapy Text: The wound bed was treated with cryotherapy after the biopsy was performed.
Type Of Destruction Used: Curettage
Wound Care: Petrolatum
Silver Nitrate Text: The wound bed was treated with silver nitrate after the biopsy was performed.
X Size Of Lesion In Cm: 0
Path Notes Override (Will Replace All Of The Above Text): Please confirm margins
Consent: Written consent was obtained and risks were reviewed including but not limited to scarring, infection, bleeding, scabbing, incomplete removal, nerve damage and allergy to anesthesia.
Curettage Text: The wound bed was treated with curettage after the biopsy was performed.
Dressing: bandage
Path Notes (To The Dermatopathologist): Please check margins
Information: Selecting Yes will display possible errors in your note based on the variables you have selected. This validation is only offered as a suggestion for you. PLEASE NOTE THAT THE VALIDATION TEXT WILL BE REMOVED WHEN YOU FINALIZE YOUR NOTE. IF YOU WANT TO FAX A PRELIMINARY NOTE YOU WILL NEED TO TOGGLE THIS TO 'NO' IF YOU DO NOT WANT IT IN YOUR FAXED NOTE.
Anesthesia Type: 1% lidocaine with epinephrine and a 1:10 solution of 8.4% sodium bicarbonate
Biopsy Method: Dermablade
Notification Instructions: Patient will be notified of biopsy results. However, patient instructed to call the office if not contacted within 2 weeks.
Anesthesia Volume In Cc (Will Not Render If 0): 0.3
Electrodesiccation And Curettage Text: The wound bed was treated with electrodesiccation and curettage after the biopsy was performed.
Post-Care Instructions: I reviewed with the patient in detail post-care instructions. Patient is to keep the biopsy site dry overnight, and then apply bacitracin twice daily until healed. Patient may apply hydrogen peroxide soaks to remove any crusting.
Depth Of Biopsy: dermis
Detail Level: Detailed
Electrodesiccation Text: The wound bed was treated with electrodesiccation after the biopsy was performed.
Hemostasis: Drysol

## 2023-05-26 ENCOUNTER — TELEPHONE (OUTPATIENT)
Dept: INTERNAL MEDICINE | Facility: CLINIC | Age: 83
End: 2023-05-26
Payer: COMMERCIAL

## 2023-05-26 DIAGNOSIS — I89.0 LYMPHEDEMA: Primary | ICD-10-CM

## 2023-05-31 ENCOUNTER — TELEPHONE (OUTPATIENT)
Dept: INTERNAL MEDICINE | Facility: CLINIC | Age: 83
End: 2023-05-31

## 2023-05-31 ENCOUNTER — THERAPY VISIT (OUTPATIENT)
Dept: OCCUPATIONAL THERAPY | Facility: CLINIC | Age: 83
End: 2023-05-31
Attending: INTERNAL MEDICINE
Payer: COMMERCIAL

## 2023-05-31 DIAGNOSIS — I89.0 LYMPHEDEMA: ICD-10-CM

## 2023-05-31 PROCEDURE — 97165 OT EVAL LOW COMPLEX 30 MIN: CPT | Mod: GO | Performed by: OCCUPATIONAL THERAPIST

## 2023-05-31 PROCEDURE — 97535 SELF CARE MNGMENT TRAINING: CPT | Mod: GO | Performed by: OCCUPATIONAL THERAPIST

## 2023-05-31 PROCEDURE — 97140 MANUAL THERAPY 1/> REGIONS: CPT | Mod: GO | Performed by: OCCUPATIONAL THERAPIST

## 2023-05-31 NOTE — PROGRESS NOTES
OCCUPATIONAL THERAPY EVALUATION  Type of Visit: Evaluation    See electronic medical record for Abuse and Falls Screening details.    Subjective  pt  Has been seen by this therapis in the past and tx was mild and just used compression velcro and MLD and home program edu as patient has a fear of GCB from a previous tx exp that didn't go well. , pt has had recent weight gain of 15 lbs or so and has has increase tightness of skin in right leg and increased swell from toes to thigh for R LE.      Presenting condition or subjective complaint: limpedemia in right leg  Date of onset: 05/26/23    Relevant medical history:   HRN, Myalgia an myositosis, hyperlipedemia,  Dates & types of surgery:   2007  Zzhc colonoscopy thru stoma, diagnostic, abdomen surgery hysterectomy 2005, uterine CA    Prior diagnostic imaging/testing results: Other no testing   Prior therapy history for the same diagnosis, illness or injury: Yes last year leg therapy    Prior Level of Function   Transfers: Independent  Ambulation: Independent  ADL: Independent  IADL: Driving, Finances, Housekeeping, Laundry, Meal preparation, Medication management, Yard work    Living Environment  Social support: With a significant other or spouse   Type of home: House   Stairs to enter the home: Yes 7 Is there a railing: Yes   Ramp: No   Stairs inside the home: Yes 7 Is there a railing: Yes   Help at home: None  Equipment owned:       Employment: No    Hobbies/Interests:      Patient goals for therapy:      Pain assessment: Pain denied     Objective      EDEMA EVALUATION  Additional history:  Body part affected by edema: rgt leg  If cancer related, treatment:    If not cancer related, problems with veins or cause of swelling: yes  Distance able to walk: 2-3 miles  Time able to stand: long time  Sensation problems in hands/feet: No    Edema etiology: Unknown    FUNCTIONAL SCALES  LLIS Score: 20  Lower Extremity Functional Scale (score out of 80). A lower score  indicates greater impairment:    Shoulder Pain and Disability Index (score out of 100).  A higher score indicates greater impairment:      Cognitive Status Examination  Orientation: Oriented to person, place and time   Level of Consciousness: Alert  Follows Commands and Answers Questions: 100% of the time  Personal Safety and Judgement: Intact  Memory: Intact    EDEMA  Skin Condition: WNL  Scar: No  Capillary Refill: Symmetrical  Radial Pulse: Symmetrical  Dorsal Pedal Pulse: Symmetrical  Stemmer Sign: +  Ulceration: No    GIRTH MEASUREMENTS: Refer to separate girth measurement flowsheet.     VOLUME UE  Right UE (mL)    Left UE (mL)    UE Volume Comparison N/A   % Difference    VOLUME LE  Right LE (mL)    Left LE (mL)    LE Volume Comparison RLE volume greater than LLE volume   % Difference    Head/Neck Volume     Trunk Volume    Genital Volume         BED MOBILITY: WNL  TRANSFERS: WNL  GAIT/LOCOMOTION: Indep  BALANCE: WNL    COORDINATION: WNL  MUSCLE TONE: WNL    Assessment & Plan   CLINICAL IMPRESSIONS   Medical Diagnosis: I89.0 (ICD-10-CM) - Lymphedema    Treatment Diagnosis: Lymphedema R LE    Impression/Assessment: Patient's limitations or Problem List includes: Increased edema of the right knee and ankle which interferes with the patient's ability to perform Recreational Activities and Household Chores as compared to previous level of function.    Clinical Decision Making (Complexity):   Assessment of Occupational Performance: 1-3 Performance Deficits  Occupational Performance Limitations: home establishment and management and leisure activities  Clinical Decision Making (Complexity): Low complexity    PLAN OF CARE  Treatment Interventions:   Interventions: Self-Care/Home Management, Gradient Compression Bandaging, Manual Therapy    Long Term Goals   OT Goal 1  Goal Identifier: Home Program and Precautions  Goal Description: Pt will report understanding s/s lymphedema and methods of prevention and demonstrate  independence in performing prescribed exercises to facilitate the lymph system and muscle pumping systems for max reductions needed to help improve mobility engagement and increase I with LB cares  Rationale: In order to maximize safety and independence with performance of self-care activities  Target Date: 08/28/23  OT Goal 2  Goal Identifier: GCB  Goal Description: Tolerate gradient compression bandaging/wearing compression garments 23 hrs./day to prevent re-accumulation of extracellular fluid  for max reductions needed to help improve mobility engagement and increase I with LB cares and be able to independently don or instruct caregiver with quick wrap version of GCB  Rationale: In order to maximize safety and independence with performance of self-care activities  Target Date: 08/28/23  OT Goal 3  Goal Identifier: Volume  Goal Description: Circumferential/volumetric measurements will be reduced in the ____ to within ___% larger than the ____ to enable patient to (michael her garments, walk with normal gait,  .)  Rationale: In order to maximize safety and independence with performance of self-care activities  Target Date: 08/28/23      Frequency of Treatment: [id number] wks   Duration of Treatment: x 2 wks x2 wks     Recommended Referrals to Other Professionals: N/A  Education Assessment: Learner/Method: Patient     Risks and benefits of evaluation/treatment have been explained.   Patient/Family/caregiver agrees with Plan of Care.     Evaluation Time:    OT Eval, Low Complexity Minutes (61613): 15       Signing Clinician: DINESH BESS OT

## 2023-05-31 NOTE — TELEPHONE ENCOUNTER
Patient calling  She saw her lymphedema provider who told her she has a swollen lymph node on her right side above her leg close to her private area that needs a ultrasound. Please advise  Ok to call and dann [phone]

## 2023-06-01 ENCOUNTER — OFFICE VISIT (OUTPATIENT)
Dept: INTERNAL MEDICINE | Facility: CLINIC | Age: 83
End: 2023-06-01
Payer: COMMERCIAL

## 2023-06-01 ENCOUNTER — MYC MEDICAL ADVICE (OUTPATIENT)
Dept: INTERNAL MEDICINE | Facility: CLINIC | Age: 83
End: 2023-06-01

## 2023-06-01 VITALS
DIASTOLIC BLOOD PRESSURE: 66 MMHG | HEIGHT: 61 IN | HEART RATE: 78 BPM | SYSTOLIC BLOOD PRESSURE: 124 MMHG | TEMPERATURE: 97 F | RESPIRATION RATE: 16 BRPM | OXYGEN SATURATION: 96 % | WEIGHT: 137 LBS | BODY MASS INDEX: 25.86 KG/M2

## 2023-06-01 DIAGNOSIS — R22.9 LOCALIZED SUPERFICIAL SWELLING, MASS, OR LUMP: Primary | ICD-10-CM

## 2023-06-01 DIAGNOSIS — R19.09 MASS OF RIGHT INGUINAL REGION: ICD-10-CM

## 2023-06-01 DIAGNOSIS — Z85.828 HISTORY OF SKIN CANCER: Primary | ICD-10-CM

## 2023-06-01 PROCEDURE — 99213 OFFICE O/P EST LOW 20 MIN: CPT | Performed by: INTERNAL MEDICINE

## 2023-06-01 NOTE — PROGRESS NOTES
"  Assessment & Plan     (R22.9) Localized superficial swelling, mass, or lump  (primary encounter diagnosis)  Plan: US Abdomen Complete-ultrasound focusing on the right inguinal area.pt was told I will contact her after results and proceed accordingly.       Ordering of each unique test     Monica Sultana MD  [facility]RE[facility] Kingston is a 82 year old, presenting for the following health issues:  RECHECK  \"lymphnodes\"      6/1/2023     9:42 AM   Additional Questions   Roomed by JONELLE Mason LPN   Accompanied by none         6/1/2023     9:42 AM   Patient Reported Additional Medications   Patient reports taking the following new medications none     History of Present Illness       Reason for visit:  Nodual  Symptom onset:  1-3 days ago  Symptom intensity:  Mild  Symptom progression:  Staying the same  Had these symptoms before:  No  What makes it worse:  No  What makes it better:  No    She eats 2-3 servings of fruits and vegetables daily.She consumes 0 sweetened beverage(s) daily.She exercises with enough effort to increase her heart rate 60 or more minutes per day.  She exercises with enough effort to increase her heart rate 6 days per week.   She is taking medications regularly.     Pt is a 82 year old female who is seen here to day with c/o lump  rt lower abd area which was noticed during her lymphedema therapy session , patient has not felt the lump in the past, no pain .  Patient has history of endometrial cancer s/p hysterectomy      Past Medical History:   Diagnosis Date     Benign neoplasm of skin, site unspecified     sees derm.     Calculus of kidney     3 passed spont.     Chronic subinvolution of uterus      Disorder of bone and cartilage, unspecified     osteopenia     Dyspepsia and other specified disorders of function of stomach     dyspepsia     Generalized osteoarthrosis, unspecified site      H/O thyroid nodule     f/u with endocrinologist     Hematuria  "    microscopic     HTN (hypertension)      Malignant neoplasm of corpus uteri, except isthmus (H)     surgery and radiation rx.stage IC, grade 3 endometrial carcinoma.     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      Nontoxic multinodular goiter     sees endocrine     Other and unspecified hyperlipidemia        Current Outpatient Medications   Medication Sig Dispense Refill     Ascorbic Acid (VITAMIN C CR) 1000 MG TBCR        Cholecalciferol (VITAMIN D3 PO) Take 2,000 Units by mouth daily       hydrochlorothiazide (HYDRODIURIL) 25 MG tablet Take 1 tablet (25 mg) by mouth daily 90 tablet 1     IBANdronate (BONIVA) 150 MG tablet TAKE 1 TABLET (150 MG) BY MOUTH EVERY 30 DAYS 3 tablet 3     lisinopril (ZESTRIL) 5 MG tablet Take 1 tablet (5 mg) by mouth daily 90 tablet 1     loperamide (IMODIUM A-D) 2 MG tablet Take 2 mg by mouth as needed for diarrhea       Multiple Vitamins-Minerals (MULTIVITAMIN & MINERAL PO) Take 1 tablet by mouth daily       omega-3 fatty acids 1200 MG capsule Take 1 capsule by mouth daily 90 capsule      saccharomyces boulardii (FLORASTOR) 250 MG capsule Take 250 mg by mouth 2 times daily       simvastatin (ZOCOR) 10 MG tablet Take 1 tablet (10 mg) by mouth At Bedtime 90 tablet 3     vitamin B-12 (CYANOCOBALAMIN) 2000 MCG tablet        diphenoxylate-atropine (LOMOTIL) 2.5-0.025 MG tablet Take 1 tablet by mouth 3 times daily as needed for diarrhea (Patient not taking: Reported on 3/29/2023) 90 tablet 1     fluticasone (FLONASE) 50 MCG/ACT nasal spray INSTILL 2 SPRAYS INTO BOTH NOSTRILS DAILY (Patient not taking: Reported on 3/29/2023) 16 g 3        Review of Systems   CONSTITUTIONAL: NEGATIVE for fever, chills, change in weight  INTEGUMENTARY/SKIN: Lump rt groin area  GI: NEGATIVE for nausea, abdominal pain,       Objective    /66   Pulse 78   Temp 97  F (36.1  C) (Oral)   Resp 16   Ht 1.549 m (5'  "1\")   Wt 62.1 kg (137 lb)   SpO2 96%   Breastfeeding No   BMI 25.89 kg/m    Body mass index is 25.89 kg/m .  Physical Exam   GENERAL: healthy, alert and no distress  RESP: lungs clear to auscultation - no rales, rhonchi or wheezes  CV: regular rate and rhythm, normal S1 S2,   ABDOMEN: soft, nontender, and bowel sounds normal, about  4-5 cm oblong firm lump felt rt inguinal area , no tenderness.          "

## 2023-06-06 NOTE — TELEPHONE ENCOUNTER
Patient Returning Call    Reason for call:  5/7/23 WENT TO [facility] AND THE BIOPSY SAID SKIN CANCER. WHAT SPECIALIST CAN SHE SEE AND HOW TO MAKE AN APPT. [phone] IS [facility] NUMBER. IF PT NEED REFERRAL PLEASE CALL BACK ASAP NO MATTER WHAT THE ADVICE IS    Information relayed to patient:  SENDING MESSAGE    Patient has additional questions:  WOULD LIKE A CALL BACK      [facility] or would you prefer to receive a phone call?:   PHONE CALL

## 2023-06-07 NOTE — TELEPHONE ENCOUNTER
The patient is calling and would like to know if Dr Sultana will refer her to someplace other than [facility] for her skin cancer. The patient cannot get anyone from [facility] to return any of her calls to get the surgery for this set up.

## 2023-06-07 NOTE — TELEPHONE ENCOUNTER
Contacted patient to schedule general dermatology consult.   Patient stated his is for Derm Surgery to remove skin cancer from her neck.   Requesting dermatology referral be placed as Derm Surgery instead of General Dermatology  Informed patient records and positive biopsy will need to be faxed.

## 2023-06-07 NOTE — TELEPHONE ENCOUNTER
[facility] dermatology referral done, they will contact patient within 24 to 48 hours   Also provide pt  these below numbers if her insurance covers she can call and make an appointment with this clinic's   FH[facility]: [facility] [place] [phone]   [email or web address]  [facility] [place] [phone]   [email or web address]  [facility] [place] [phone]  [email or web address]

## 2023-06-15 ENCOUNTER — HOSPITAL ENCOUNTER (OUTPATIENT)
Dept: ULTRASOUND IMAGING | Facility: CLINIC | Age: 83
Discharge: HOME | End: 2023-06-15
Attending: INTERNAL MEDICINE | Admitting: INTERNAL MEDICINE
Payer: COMMERCIAL

## 2023-06-15 DIAGNOSIS — R22.9 LOCALIZED SUPERFICIAL SWELLING, MASS, OR LUMP: ICD-10-CM

## 2023-06-15 PROCEDURE — 76882 US LMTD JT/FCL EVL NVASC XTR: CPT | Mod: RT

## 2023-06-16 ENCOUNTER — MYC MEDICAL ADVICE (OUTPATIENT)
Dept: INTERNAL MEDICINE | Facility: CLINIC | Age: 83
End: 2023-06-16
Payer: COMMERCIAL

## 2023-06-16 NOTE — TELEPHONE ENCOUNTER
Please order CT scan as needed. Patient is requesting [facility].    Per ultrasound results:    IMPRESSION:  Nonspecific hypoechoic mass measuring up to 5.7 cm in the  right inguinal area as described. While this may reflect a complex  cyst, it is not fully characterized on ultrasound. Recommend further  evaluation with CT of the pelvis with contrast to better assess the  cystlike mass and its relationship to adjacent structures.

## 2023-06-16 NOTE — TELEPHONE ENCOUNTER
Patient also sent a message regarding this in 6/1/23 [email or web address] message. That message was routed to provider.

## 2023-06-19 ENCOUNTER — HOSPITAL ENCOUNTER (OUTPATIENT)
Dept: CT IMAGING | Facility: CLINIC | Age: 83
Discharge: HOME | End: 2023-06-19
Attending: INTERNAL MEDICINE | Admitting: INTERNAL MEDICINE
Payer: COMMERCIAL

## 2023-06-19 DIAGNOSIS — R19.09 MASS OF RIGHT INGUINAL REGION: ICD-10-CM

## 2023-06-19 LAB
CREAT BLD-MCNC: 0.5 MG/DL (ref 0.5–1)
GFR SERPL CREATININE-BSD FRML MDRD: >60 ML/MIN/1.73M2

## 2023-06-19 PROCEDURE — G1010 CDSM STANSON: HCPCS

## 2023-06-19 PROCEDURE — 250N000011 HC RX IP 250 OP 636: Performed by: RADIOLOGY

## 2023-06-19 PROCEDURE — 250N000009 HC RX 250: Performed by: RADIOLOGY

## 2023-06-19 PROCEDURE — 82565 ASSAY OF CREATININE: CPT

## 2023-06-19 PROCEDURE — 72193 CT PELVIS W/DYE: CPT | Mod: MG

## 2023-06-19 RX ORDER — IOPAMIDOL 755 MG/ML
500 INJECTION, SOLUTION INTRAVASCULAR ONCE
Status: COMPLETED | OUTPATIENT
Start: 2023-06-19 | End: 2023-06-19

## 2023-06-19 RX ADMIN — SODIUM CHLORIDE 47 ML: 9 INJECTION, SOLUTION INTRAVENOUS at 11:10

## 2023-06-19 RX ADMIN — IOPAMIDOL 69 ML: 755 INJECTION, SOLUTION INTRAVENOUS at 11:10

## 2023-06-20 ENCOUNTER — APPOINTMENT (OUTPATIENT)
Dept: URBAN - METROPOLITAN AREA CLINIC 255 | Age: 83
Setting detail: DERMATOLOGY
End: 2023-06-22

## 2023-06-20 PROBLEM — C44.42 SQUAMOUS CELL CARCINOMA OF SKIN OF SCALP AND NECK: Status: ACTIVE | Noted: 2023-06-20

## 2023-06-20 PROCEDURE — 17311 MOHS 1 STAGE H/N/HF/G: CPT

## 2023-06-20 PROCEDURE — 13132 CMPLX RPR F/C/C/M/N/AX/G/H/F: CPT

## 2023-06-20 PROCEDURE — OTHER MOHS SURGERY: OTHER

## 2023-06-20 PROCEDURE — OTHER MIPS QUALITY: OTHER

## 2023-06-20 NOTE — PROCEDURE: MOHS SURGERY
Body Location Override (Optional - Billing Will Still Be Based On Selected Body Map Location If Applicable): Left Lateral Neck

## 2023-06-20 NOTE — PROCEDURE: MIPS QUALITY
Quality 226: Preventive Care And Screening: Tobacco Use: Screening And Cessation Intervention: Patient screened for tobacco use and is an ex/non-smoker
Quality 111:Pneumonia Vaccination Status For Older Adults: Patient received any pneumococcal conjugate or polysaccharide vaccine on or after their 60th birthday and before the end of the measurement period
Quality 143: Oncology: Medical And Radiation- Pain Intensity Quantified: Pain severity quantified, pain present
Quality 130: Documentation Of Current Medications In The Medical Record: Current Medications Documented
Quality 431: Preventive Care And Screening: Unhealthy Alcohol Use - Screening: Patient not identified as an unhealthy alcohol user when screened for unhealthy alcohol use using a systematic screening method
Detail Level: Detailed
Quality 110: Preventive Care And Screening: Influenza Immunization: Influenza Immunization previously received during influenza season

## 2023-06-22 ENCOUNTER — APPOINTMENT (OUTPATIENT)
Dept: URBAN - METROPOLITAN AREA CLINIC 253 | Age: 83
Setting detail: DERMATOLOGY
End: 2023-06-25

## 2023-06-22 VITALS — HEIGHT: 60 IN | RESPIRATION RATE: 14 BRPM | WEIGHT: 135 LBS

## 2023-06-22 DIAGNOSIS — L82.0 INFLAMED SEBORRHEIC KERATOSIS: ICD-10-CM

## 2023-06-22 DIAGNOSIS — Z48.817 ENCOUNTER FOR SURGICAL AFTERCARE FOLLOWING SURGERY ON THE SKIN AND SUBCUTANEOUS TISSUE: ICD-10-CM

## 2023-06-22 PROCEDURE — OTHER PHOTO-DOCUMENTATION: OTHER

## 2023-06-22 PROCEDURE — OTHER MIPS QUALITY: OTHER

## 2023-06-22 PROCEDURE — 99213 OFFICE O/P EST LOW 20 MIN: CPT | Mod: 24,25

## 2023-06-22 PROCEDURE — 17110 DESTRUCT B9 LESION 1-14: CPT | Mod: 79

## 2023-06-22 PROCEDURE — OTHER COUNSELING: OTHER

## 2023-06-22 PROCEDURE — OTHER LIQUID NITROGEN: OTHER

## 2023-06-22 ASSESSMENT — LOCATION SIMPLE DESCRIPTION DERM
LOCATION SIMPLE: RIGHT ANTERIOR NECK
LOCATION SIMPLE: UPPER BACK
LOCATION SIMPLE: LEFT ANTERIOR NECK
LOCATION SIMPLE: POSTERIOR NECK
LOCATION SIMPLE: TRAPEZIAL NECK
LOCATION SIMPLE: LEFT CLAVICULAR SKIN
LOCATION SIMPLE: NECK

## 2023-06-22 ASSESSMENT — LOCATION DETAILED DESCRIPTION DERM
LOCATION DETAILED: RIGHT MEDIAL TRAPEZIAL NECK
LOCATION DETAILED: LEFT CLAVICULAR SKIN
LOCATION DETAILED: RIGHT INFERIOR LATERAL NECK
LOCATION DETAILED: LEFT INFERIOR ANTERIOR NECK
LOCATION DETAILED: RIGHT INFERIOR ANTERIOR NECK
LOCATION DETAILED: LEFT MEDIAL TRAPEZIAL NECK
LOCATION DETAILED: LEFT CLAVICULAR NECK
LOCATION DETAILED: LEFT CENTRAL LATERAL NECK
LOCATION DETAILED: MID TRAPEZIAL NECK
LOCATION DETAILED: SUPERIOR THORACIC SPINE

## 2023-06-22 ASSESSMENT — LOCATION ZONE DERM
LOCATION ZONE: NECK
LOCATION ZONE: TRUNK

## 2023-06-22 NOTE — PROCEDURE: COUNSELING
Detail Level: Zone
Patient Specific Counseling (Will Not Stick From Patient To Patient): Post Mohs procedure 6/20/23. Biopsy proven SCC KA.\\nReassured her that it is healing appropriately on exam. No complications noted.\\nExcision was cleaned with hydrogen perioxide, Vaseline and bandaid applied.
Detail Level: Detailed

## 2023-06-22 NOTE — PROCEDURE: LIQUID NITROGEN
Spray Paint Text: The liquid nitrogen was applied to the skin utilizing a spray paint frosting technique.
Add 52 Modifier (Optional): no
Show Applicator Variable?: Yes
Post-Care Instructions: I reviewed with the patient in detail post-care instructions. Patient is to wear sunprotection, and avoid picking at any of the treated lesions. Pt may apply Vaseline to crusted or scabbing areas.
Duration Of Freeze Thaw-Cycle (Seconds): 2
Medical Necessity Information: It is in your best interest to select a reason for this procedure from the list below. All of these items fulfill various CMS LCD requirements except the new and changing color options.
Consent: The patient's consent was obtained including but not limited to risks of crusting, scabbing, blistering, scarring, darker or lighter pigmentary change, recurrence, incomplete removal and infection.
Medical Necessity Clause: This procedure was medically necessary because the lesions that were treated were:
Number Of Freeze-Thaw Cycles: 3 freeze-thaw cycles
Detail Level: Detailed

## 2023-06-23 DIAGNOSIS — K58.0 IRRITABLE BOWEL SYNDROME WITH DIARRHEA: ICD-10-CM

## 2023-06-23 RX ORDER — DIPHENOXYLATE HCL/ATROPINE 2.5-.025MG
1 TABLET ORAL 3 TIMES DAILY PRN
Qty: 90 TABLET | Refills: 1 | Status: SHIPPED | OUTPATIENT
Start: 2023-06-23 | End: 2023-11-21

## 2023-06-23 NOTE — TELEPHONE ENCOUNTER
Lomotil refill request.     Last refill on 7/1/2022 for #90 with 1 refill.     Routing refill request to provider for review/approval because:  Drug not on the [facility] refill protocol

## 2023-06-27 ENCOUNTER — APPOINTMENT (OUTPATIENT)
Dept: URBAN - METROPOLITAN AREA CLINIC 255 | Age: 83
Setting detail: DERMATOLOGY
End: 2023-07-04

## 2023-06-27 DIAGNOSIS — Z48.02 ENCOUNTER FOR REMOVAL OF SUTURES: ICD-10-CM

## 2023-06-27 PROCEDURE — OTHER RETURN TO REFERRING PROVIDER: OTHER

## 2023-06-27 PROCEDURE — OTHER SUTURE REMOVAL (GLOBAL PERIOD): OTHER

## 2023-06-27 PROCEDURE — OTHER MIPS QUALITY: OTHER

## 2023-06-27 PROCEDURE — 99024 POSTOP FOLLOW-UP VISIT: CPT

## 2023-06-27 PROCEDURE — OTHER COUNSELING: OTHER

## 2023-06-27 PROCEDURE — OTHER DIAGNOSIS COMMENT: OTHER

## 2023-06-27 ASSESSMENT — LOCATION SIMPLE DESCRIPTION DERM: LOCATION SIMPLE: NECK

## 2023-06-27 ASSESSMENT — LOCATION ZONE DERM: LOCATION ZONE: NECK

## 2023-06-27 ASSESSMENT — LOCATION DETAILED DESCRIPTION DERM: LOCATION DETAILED: LEFT CENTRAL LATERAL NECK

## 2023-06-27 NOTE — PROCEDURE: DIAGNOSIS COMMENT
Comment: S/P MMS, for Basal / /Squamous Cell Carcinoma on (date )
Detail Level: Simple
Render Risk Assessment In Note?: no

## 2023-06-27 NOTE — PROCEDURE: SUTURE REMOVAL (GLOBAL PERIOD)
Detail Level: Detailed
Add 26184 Cpt? (Important Note: In 2017 The Use Of 10011 Is Being Tracked By Cms To Determine Future Global Period Reimbursement For Global Periods): no
Body Location Override (Optional - Billing Will Still Be Based On Selected Body Map Location If Applicable): Left Lateral Neck

## 2023-06-30 ENCOUNTER — OFFICE VISIT (OUTPATIENT)
Dept: SURGERY | Facility: CLINIC | Age: 83
End: 2023-06-30
Payer: COMMERCIAL

## 2023-06-30 VITALS
BODY MASS INDEX: 26.5 KG/M2 | HEIGHT: 60 IN | WEIGHT: 135 LBS | SYSTOLIC BLOOD PRESSURE: 120 MMHG | RESPIRATION RATE: 15 BRPM | DIASTOLIC BLOOD PRESSURE: 72 MMHG | OXYGEN SATURATION: 98 % | HEART RATE: 70 BPM

## 2023-06-30 DIAGNOSIS — I89.8 LYMPHOCELE: Primary | ICD-10-CM

## 2023-06-30 PROCEDURE — 99203 OFFICE O/P NEW LOW 30 MIN: CPT | Performed by: SURGERY

## 2023-06-30 RX ORDER — CHOLESTYRAMINE LIGHT 4 G/5.7G
1 POWDER, FOR SUSPENSION ORAL EVERY 24 HOURS
COMMUNITY
Start: 2023-02-07 | End: 2023-11-21

## 2023-06-30 NOTE — PROGRESS NOTES
I was asked to see this very pleasant 82-year-old patient with a history of uterine cancer and pelvic node dissection regarding a right groin mass.  The patient has had longstanding lymphedema of the right leg since she dropped a brick on her foot about 4 years ago.  Her pelvic surgery was approximately 18 years ago.  Imaging of her groin mass reveals a cystic lesion.  It is mildly tender.  The patient is not sure if it has changed in size.  She only recently noticed it during physical therapy.    Past Medical History:   Diagnosis Date     Benign neoplasm of skin, site unspecified     sees derm.     Calculus of kidney     3 passed spont.     Chronic subinvolution of uterus      Disorder of bone and cartilage, unspecified     osteopenia     Dyspepsia and other specified disorders of function of stomach     dyspepsia     Generalized osteoarthrosis, unspecified site      H/O thyroid nodule     f/u with endocrinologist     Hematuria     microscopic     HTN (hypertension)      Malignant neoplasm of corpus uteri, except isthmus (H)     surgery and radiation rx.stage IC, grade 3 endometrial carcinoma.     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      Nontoxic multinodular goiter     sees endocrine     Other and unspecified hyperlipidemia      Past Surgical History:   Procedure Laterality Date     ABDOMEN SURGERY  2005    uterine cancer Hysterectiomy     BIOPSY      many times from nodules from thyroid     CHOLECYSTECTOMY  1992     GYN SURGERY  2005    uterine cancer     HEAD & NECK SURGERY      right thyroid taken out     [facility] NONSPECIFIC PROCEDURE  1992    Cholecystectomy. abstracted 6/24/02.     [facility] NONSPECIFIC PROCEDURE      Thyroidectomy. abstracted 6/24/02.     [facility] NONSPECIFIC PROCEDURE      Dilatation & Curettage X 2. abstracted 6/24/02.     [facility] NONSPECIFIC PROCEDURE      Cystoscopy. abstracted 6/24/02.     [facility] NONSPECIFIC  PROCEDURE      hysterctomy for uterine cancer.     [facility] COLONOSCOPY THRU STOMA, DIAGNOSTIC  10/2007    due q 5 yrs     Physical exam:  The patient is in no apparent distress.  Breathing is nonlabored.  The right groin reveals a 3 x 4 cm area of fullness.  This is somewhat soft, consistent with a cystic lesion.  The overlying skin is unremarkable.  There is right leg lymphedema.  The patient is wearing a compression garment.    Assessment and plan: This is a patient with a long ago node dissection on the right and lymphedema since an injury to her foot.  She has now developed what almost certainly represents a lymphocele.  Unfortunately, this is a difficult lesion to resolve.  At this point, the patient is fairly minimally bothered by it.  I would strongly favor observing this for the time being.  If it is continuing to get larger, we may need to consider some sort of intervention.  This might involve sclerosis by interventional radiology or perhaps vascular surgery evaluation.  If, however, this remains stable or improves, then no intervention should be required.  The patient may return to see me sooner if she has any issues.  I would like the patient to come back and see me for reevaluation in 4 to 6 months.    A total of 30 minutes was spent today in chart review, patient examination and discussion, and documentation.    Levy Lockwood MD  [facility]    Please route or send letter to:  Referring Provider

## 2023-07-10 ENCOUNTER — THERAPY VISIT (OUTPATIENT)
Dept: OCCUPATIONAL THERAPY | Facility: CLINIC | Age: 83
End: 2023-07-10
Payer: COMMERCIAL

## 2023-07-10 DIAGNOSIS — I89.0 LYMPHEDEMA: Primary | ICD-10-CM

## 2023-07-10 PROCEDURE — 97140 MANUAL THERAPY 1/> REGIONS: CPT | Mod: GO | Performed by: OCCUPATIONAL THERAPIST

## 2023-07-11 ENCOUNTER — THERAPY VISIT (OUTPATIENT)
Dept: OCCUPATIONAL THERAPY | Facility: CLINIC | Age: 83
End: 2023-07-11
Payer: COMMERCIAL

## 2023-07-11 DIAGNOSIS — I89.0 LYMPHEDEMA: Primary | ICD-10-CM

## 2023-07-11 PROCEDURE — 97140 MANUAL THERAPY 1/> REGIONS: CPT | Mod: GO | Performed by: OCCUPATIONAL THERAPIST

## 2023-07-12 ENCOUNTER — THERAPY VISIT (OUTPATIENT)
Dept: OCCUPATIONAL THERAPY | Facility: CLINIC | Age: 83
End: 2023-07-12
Payer: COMMERCIAL

## 2023-07-12 DIAGNOSIS — I89.0 LYMPHEDEMA: Primary | ICD-10-CM

## 2023-07-12 PROCEDURE — 97140 MANUAL THERAPY 1/> REGIONS: CPT | Mod: GO

## 2023-07-13 ENCOUNTER — THERAPY VISIT (OUTPATIENT)
Dept: OCCUPATIONAL THERAPY | Facility: CLINIC | Age: 83
End: 2023-07-13
Payer: COMMERCIAL

## 2023-07-13 DIAGNOSIS — I89.0 LYMPHEDEMA: Primary | ICD-10-CM

## 2023-07-13 PROCEDURE — 97140 MANUAL THERAPY 1/> REGIONS: CPT | Mod: GO

## 2023-07-17 ENCOUNTER — THERAPY VISIT (OUTPATIENT)
Dept: OCCUPATIONAL THERAPY | Facility: CLINIC | Age: 83
End: 2023-07-17
Payer: COMMERCIAL

## 2023-07-17 DIAGNOSIS — I89.0 LYMPHEDEMA: Primary | ICD-10-CM

## 2023-07-17 PROCEDURE — 97140 MANUAL THERAPY 1/> REGIONS: CPT | Mod: GO

## 2023-07-18 ENCOUNTER — OFFICE VISIT (OUTPATIENT)
Dept: VASCULAR SURGERY | Facility: CLINIC | Age: 83
End: 2023-07-18
Payer: COMMERCIAL

## 2023-07-18 DIAGNOSIS — I89.0 LYMPHEDEMA: Primary | ICD-10-CM

## 2023-07-18 PROCEDURE — 99213 OFFICE O/P EST LOW 20 MIN: CPT | Performed by: SURGERY

## 2023-07-18 NOTE — NURSING NOTE
Patient Reported symptoms:    Right leg   Heaviness None of the time  Achiness None of the time   Swelling All of the time  Throbbing None of the time   Itching None of the time   Appearance Very noticeable   Impact on work/activities Symptoms but full able to participate    Left Leg   Heaviness None of the time   Achiness None of the time   Swelling Some of the time   Throbbing None of the time   Itching None of the time   Appearance Moderately noticeable   Impact on work/activities Symptoms but full able to participate

## 2023-07-18 NOTE — PATIENT INSTRUCTIONS
You have been recommended to wear Edemawear.    How to Apply EdemaWear   - Apply EdemaWear starting from toes pulling up to knees with a cuff at the top of the stockings.  - DO NOT CUT OR TRIM STOCKINGS, fold over the stocking until the top of it lays just BELOW your knee crease  - If wearing socks, wear them over top of EdemaWear. EdemaWear should be in direct contact with the skin.  - Apply a fresh pair daily    Care of EdemaWear  - DO NOT THROW AWAY THE OLD PAIR OF EDEMAWEAR, WASH IT.       o  Hand or machine wash in cold water and hang dry    EdemaWear Sizes  Size  Calf circumference  Stripe color   Small  up to 45cm  Navy_______   Medium up to 75cm  Yellow______   Large  up to 115cm  Red________   X-Large up to 150cm  Aqua_______    Options for purchasing  - [employer] [facility] [employer] [facility] ([facility])    [address] [place] [address]    Ph: [phone]    - [employer] ([facility])    Ph: [phone]  Email: [email or web address]     Visit [email or web address] for more information

## 2023-07-18 NOTE — LETTER
7/18/2023         RE: Sophia Olivarez  [address]  [place] [address]        Dear Colleague,    Thank you for referring your patient, Sophia Olivarez, to the [facility] [place]. Please see a copy of my visit note below.    [facility] [place] OFFICE NOTE    HPI:    Sophia Olivarez is a pleasant 82 year old female with whom I am familiar.  I have seen her primary regarding swelling and varicose veins of her lower extremities, last in September 2018.  I initially saw her in 2005, then in 2016 and have recommended primarily compression for her to control the edema.  Last ultrasound in August 2022 revealed no deep vein thrombosis.  The patient is also had treatment for lymphedema in the past and is currently in lymphedema therapy with progress in her swelling.    Pain does not seem to be a prominent symptom though she has varicose veins of both of her lower extremities.  Clinically, her swelling is not primarily related to her varicose veins.    She has no complaints of abdominal pain, changes in bowel habits or appetite.    PAST MEDICAL HISTORY:   Past Medical History:   Diagnosis Date     Benign neoplasm of skin, site unspecified     sees derm.     Calculus of kidney     3 passed spont.     Chronic subinvolution of uterus      Disorder of bone and cartilage, unspecified     osteopenia     Dyspepsia and other specified disorders of function of stomach     dyspepsia     Generalized osteoarthrosis, unspecified site      H/O thyroid nodule     f/u with endocrinologist     Hematuria     microscopic     HTN (hypertension)      Malignant neoplasm of corpus uteri, except isthmus (H)     surgery and radiation rx.stage IC, grade 3 endometrial carcinoma.     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      Nontoxic multinodular goiter     sees endocrine     Other and unspecified  hyperlipidemia        PAST SURGICAL HISTORY:   Past Surgical History:   Procedure Laterality Date     ABDOMEN SURGERY  2005    uterine cancer Hysterectiomy     BIOPSY      many times from nodules from thyroid     CHOLECYSTECTOMY  1992     GYN SURGERY  2005    uterine cancer     HEAD & NECK SURGERY      right thyroid taken out     [facility] NONSPECIFIC PROCEDURE  1992    Cholecystectomy. abstracted 6/24/02.     [facility] NONSPECIFIC PROCEDURE      Thyroidectomy. abstracted 6/24/02.     [facility] NONSPECIFIC PROCEDURE      Dilatation & Curettage X 2. abstracted 6/24/02.     [facility] NONSPECIFIC PROCEDURE      Cystoscopy. abstracted 6/24/02.     [facility] NONSPECIFIC PROCEDURE      hysterctomy for uterine cancer.     [facility] COLONOSCOPY THRU STOMA, DIAGNOSTIC  10/2007    due q 5 yrs       FAMILY HISTORY:   Family History   Problem Relation Age of Onset     Family History Negative Mother      Other Cancer Mother      Osteoporosis Mother      Family History Negative Father      Other Cancer Father      Neurologic Disorder Sister         MS     Diabetes Brother         also, colon CA, colon surgery     Diabetes Sister      Hypertension Sister      Hyperlipidemia Sister      Anxiety Disorder Sister      Mental Illness Sister         bi polar     Obesity Sister         from some pills too     Diabetes Brother      Colon Cancer Brother      Other Cancer Brother      Anxiety Disorder Brother      Mental Illness Brother         schitzophenia     Obesity Brother         from pills too       SOCIAL HISTORY:   Social History     Tobacco Use     Smoking status: Never     Smokeless tobacco: Never   Substance Use Topics     Alcohol use: No       Vital signs:  There were no vitals taken for this visit.    Current Outpatient Medications   Medication Sig Dispense Refill     Ascorbic Acid (VITAMIN C CR) 1000 MG TBCR        Cholecalciferol (VITAMIN D3 PO) Take 2,000 Units by mouth daily       cholestyramine light (PREVALITE) 4 GM powder Take 1 packet by mouth every 24  hours       diphenoxylate-atropine (LOMOTIL) 2.5-0.025 MG tablet Take 1 tablet by mouth 3 times daily as needed for diarrhea 90 tablet 1     fluticasone (FLONASE) 50 MCG/ACT nasal spray INSTILL 2 SPRAYS INTO BOTH NOSTRILS DAILY 16 g 3     hydrochlorothiazide (HYDRODIURIL) 25 MG tablet Take 1 tablet (25 mg) by mouth daily 90 tablet 1     IBANdronate (BONIVA) 150 MG tablet TAKE 1 TABLET (150 MG) BY MOUTH EVERY 30 DAYS 3 tablet 3     lisinopril (ZESTRIL) 5 MG tablet Take 1 tablet (5 mg) by mouth daily 90 tablet 1     loperamide (IMODIUM A-D) 2 MG tablet Take 2 mg by mouth as needed for diarrhea       Multiple Vitamins-Minerals (MULTIVITAMIN & MINERAL PO) Take 1 tablet by mouth daily       omega-3 fatty acids 1200 MG capsule Take 1 capsule by mouth daily 90 capsule      saccharomyces boulardii (FLORASTOR) 250 MG capsule Take 250 mg by mouth 2 times daily       simvastatin (ZOCOR) 10 MG tablet Take 1 tablet (10 mg) by mouth At Bedtime 90 tablet 3     vitamin B-12 (CYANOCOBALAMIN) 2000 MCG tablet          PHYSICAL EXAM:  General: Pleasant, no acute distress  Extremities:  Right lower extremity: Excess tissue deposition on the thigh with edema extending down to the ankle with a mild ankle cut off sign.  Mild edema extending into the foot.  Firmness consistent with early lipodermatosclerosis from chronic edema.  3 mm varicosity over the right patella.    Left lower extremity: Excess tissue deposition of the thigh, especially medially and distally posteriorly.  6 mm varicosities on the lateral left leg.  Trace edema of the ankle.  Early lipodermatosclerosis.    Palpable pulses in her feet.    ASSESSMENT:  Bilateral lower extremity edema with what appears to be lymphedema of her right lower extremity.  She is currently going through lymphedema therapy and is seeing progress with the swelling.    Though she has varicose veins of her left lower extremity, the edema is well controlled and she is not having significant pain or  complications, therefore no intervention is indicated at this time.    She is scheduled for a venous competency study on 8/22/2023 to ensure that there is no thrombotic/obstructive process in her right lower extremity.    PLAN:  Follow through with venous competency study as scheduled     Adams Velasquez MD FACS    Dictated using Dragon voice recognition software which may result in transcription errors          Again, thank you for allowing me to participate in the care of your patient.        Sincerely,        Adams Velasquez MD

## 2023-07-19 ENCOUNTER — THERAPY VISIT (OUTPATIENT)
Dept: OCCUPATIONAL THERAPY | Facility: CLINIC | Age: 83
End: 2023-07-19
Payer: COMMERCIAL

## 2023-07-19 DIAGNOSIS — I89.0 LYMPHEDEMA: Primary | ICD-10-CM

## 2023-07-19 PROCEDURE — 97110 THERAPEUTIC EXERCISES: CPT | Mod: GO

## 2023-07-19 PROCEDURE — 97140 MANUAL THERAPY 1/> REGIONS: CPT | Mod: GO

## 2023-07-20 ENCOUNTER — THERAPY VISIT (OUTPATIENT)
Dept: OCCUPATIONAL THERAPY | Facility: CLINIC | Age: 83
End: 2023-07-20
Payer: COMMERCIAL

## 2023-07-20 DIAGNOSIS — I89.0 LYMPHEDEMA: Primary | ICD-10-CM

## 2023-07-20 PROCEDURE — 97140 MANUAL THERAPY 1/> REGIONS: CPT | Mod: GO | Performed by: OCCUPATIONAL THERAPIST

## 2023-07-20 PROCEDURE — 97535 SELF CARE MNGMENT TRAINING: CPT | Mod: GO | Performed by: OCCUPATIONAL THERAPIST

## 2023-07-23 ASSESSMENT — ENCOUNTER SYMPTOMS
CONSTIPATION: 0
BREAST MASS: 0
FEVER: 0
SHORTNESS OF BREATH: 0
WEAKNESS: 0
EYE PAIN: 0
PARESTHESIAS: 0
FREQUENCY: 0
HEMATURIA: 0
ARTHRALGIAS: 0
COUGH: 0
DIZZINESS: 0
NAUSEA: 0
HEMATOCHEZIA: 0
CHILLS: 0
DYSURIA: 0
DIARRHEA: 1
PALPITATIONS: 0
HEARTBURN: 0
SORE THROAT: 0
NERVOUS/ANXIOUS: 0
ABDOMINAL PAIN: 0
HEADACHES: 0
MYALGIAS: 1

## 2023-07-23 ASSESSMENT — ACTIVITIES OF DAILY LIVING (ADL): CURRENT_FUNCTION: NO ASSISTANCE NEEDED

## 2023-07-24 ENCOUNTER — OFFICE VISIT (OUTPATIENT)
Dept: INTERNAL MEDICINE | Facility: CLINIC | Age: 83
End: 2023-07-24
Payer: COMMERCIAL

## 2023-07-24 VITALS
TEMPERATURE: 96.4 F | RESPIRATION RATE: 14 BRPM | HEIGHT: 61 IN | SYSTOLIC BLOOD PRESSURE: 132 MMHG | WEIGHT: 134.5 LBS | BODY MASS INDEX: 25.39 KG/M2 | DIASTOLIC BLOOD PRESSURE: 80 MMHG | HEART RATE: 79 BPM | OXYGEN SATURATION: 98 %

## 2023-07-24 DIAGNOSIS — K58.0 IRRITABLE BOWEL SYNDROME WITH DIARRHEA: ICD-10-CM

## 2023-07-24 DIAGNOSIS — Z00.00 ENCOUNTER FOR MEDICARE ANNUAL WELLNESS EXAM: Primary | ICD-10-CM

## 2023-07-24 DIAGNOSIS — Z78.0 ASYMPTOMATIC POSTMENOPAUSAL STATUS: ICD-10-CM

## 2023-07-24 DIAGNOSIS — I10 ESSENTIAL HYPERTENSION: ICD-10-CM

## 2023-07-24 DIAGNOSIS — E78.2 MIXED HYPERLIPIDEMIA: ICD-10-CM

## 2023-07-24 DIAGNOSIS — R73.03 PREDIABETES: ICD-10-CM

## 2023-07-24 DIAGNOSIS — C55 MALIGNANT NEOPLASM OF UTERUS, UNSPECIFIED SITE (H): ICD-10-CM

## 2023-07-24 LAB
ALBUMIN SERPL BCG-MCNC: 4.5 G/DL (ref 3.5–5.2)
ALP SERPL-CCNC: 74 U/L (ref 35–104)
ALT SERPL W P-5'-P-CCNC: 20 U/L (ref 0–50)
ANION GAP SERPL CALCULATED.3IONS-SCNC: 12 MMOL/L (ref 7–15)
AST SERPL W P-5'-P-CCNC: 30 U/L (ref 0–45)
BILIRUB SERPL-MCNC: 0.6 MG/DL
BUN SERPL-MCNC: 8.7 MG/DL (ref 8–23)
CALCIUM SERPL-MCNC: 9.6 MG/DL (ref 8.8–10.2)
CANCER AG125 SERPL-ACNC: 4 U/ML
CHLORIDE SERPL-SCNC: 96 MMOL/L (ref 98–107)
CREAT SERPL-MCNC: 0.52 MG/DL (ref 0.51–0.95)
DEPRECATED HCO3 PLAS-SCNC: 28 MMOL/L (ref 22–29)
ERYTHROCYTE [DISTWIDTH] IN BLOOD BY AUTOMATED COUNT: 12.7 % (ref 10–15)
GFR SERPL CREATININE-BSD FRML MDRD: >90 ML/MIN/1.73M2
GLUCOSE SERPL-MCNC: 99 MG/DL (ref 70–99)
HBA1C MFR BLD: 5.5 % (ref 0–5.6)
HCT VFR BLD AUTO: 38.6 % (ref 35–47)
HGB BLD-MCNC: 13.1 G/DL (ref 11.7–15.7)
MCH RBC QN AUTO: 29.6 PG (ref 26.5–33)
MCHC RBC AUTO-ENTMCNC: 33.9 G/DL (ref 31.5–36.5)
MCV RBC AUTO: 87 FL (ref 78–100)
PLATELET # BLD AUTO: 264 10E3/UL (ref 150–450)
POTASSIUM SERPL-SCNC: 3.7 MMOL/L (ref 3.4–5.3)
PROT SERPL-MCNC: 7.5 G/DL (ref 6.4–8.3)
RBC # BLD AUTO: 4.43 10E6/UL (ref 3.8–5.2)
SODIUM SERPL-SCNC: 136 MMOL/L (ref 136–145)
WBC # BLD AUTO: 4.6 10E3/UL (ref 4–11)

## 2023-07-24 PROCEDURE — 83036 HEMOGLOBIN GLYCOSYLATED A1C: CPT | Performed by: INTERNAL MEDICINE

## 2023-07-24 PROCEDURE — 36415 COLL VENOUS BLD VENIPUNCTURE: CPT | Performed by: INTERNAL MEDICINE

## 2023-07-24 PROCEDURE — 99214 OFFICE O/P EST MOD 30 MIN: CPT | Mod: 25 | Performed by: INTERNAL MEDICINE

## 2023-07-24 PROCEDURE — 85027 COMPLETE CBC AUTOMATED: CPT | Performed by: INTERNAL MEDICINE

## 2023-07-24 PROCEDURE — 99397 PER PM REEVAL EST PAT 65+ YR: CPT | Performed by: INTERNAL MEDICINE

## 2023-07-24 PROCEDURE — 80053 COMPREHEN METABOLIC PANEL: CPT | Performed by: INTERNAL MEDICINE

## 2023-07-24 PROCEDURE — 86304 IMMUNOASSAY TUMOR CA 125: CPT | Performed by: INTERNAL MEDICINE

## 2023-07-24 RX ORDER — LISINOPRIL 5 MG/1
5 TABLET ORAL DAILY
Qty: 90 TABLET | Refills: 1 | Status: SHIPPED | OUTPATIENT
Start: 2023-07-24 | End: 2024-01-15

## 2023-07-24 RX ORDER — SIMVASTATIN 10 MG
10 TABLET ORAL AT BEDTIME
Qty: 90 TABLET | Refills: 3 | Status: SHIPPED | OUTPATIENT
Start: 2023-07-24 | End: 2024-07-23

## 2023-07-24 RX ORDER — HYDROCHLOROTHIAZIDE 25 MG/1
25 TABLET ORAL DAILY
Qty: 90 TABLET | Refills: 1 | Status: SHIPPED | OUTPATIENT
Start: 2023-07-24 | End: 2024-01-15

## 2023-07-24 ASSESSMENT — ACTIVITIES OF DAILY LIVING (ADL): CURRENT_FUNCTION: NO ASSISTANCE NEEDED

## 2023-07-24 NOTE — PROGRESS NOTES
"SUBJECTIVE:   Vashti is a 82 year old who presents for Preventive Visit.      7/24/2023     8:37 AM   Additional Questions   Roomed by Deann   Accompanied by self         7/24/2023     8:37 AM   Patient Reported Additional Medications   Patient reports taking the following new medications none     Are you in the first 12 months of your Medicare coverage?  No    Healthy Habits:     In general, how would you rate your overall health?  Fair    Frequency of exercise:  4-5 days/week    Duration of exercise:  45-60 minutes    Do you usually eat at least 4 servings of fruit and vegetables a day, include whole grains    & fiber and avoid regularly eating high fat or \"junk\" foods?  Yes    Taking medications regularly:  Yes    Medication side effects:  Not applicable    Ability to successfully perform activities of daily living:  No assistance needed    Home Safety:  No safety concerns identified    Hearing Impairment:  No hearing concerns    In the past 6 months, have you been bothered by leaking of urine?  No    In general, how would you rate your overall mental or emotional health?  Good    Additional concerns today:  Yes        Have you ever done Advance Care Planning? (For example, a Health Directive, POLST, or a discussion with a medical provider or your loved ones about your wishes): Yes, advance care planning is on file.       Fall risk  Fallen 2 or more times in the past year?: No  Any fall with injury in the past year?: No      Cognitive Screening   1) Repeat 3 items (Leader, Season, Table)    2) Clock draw: NORMAL  3) 3 item recall: Recalls 3 objects  Results: 3 items recalled: COGNITIVE IMPAIRMENT LESS LIKELY    Mini-CogTM Copyright ALEC Scanlon. Licensed by the author for use in [facility]; reprinted with permission (thomas@.[email or web address]). All rights reserved.      Do you have sleep apnea, excessive snoring or daytime drowsiness? : no    Reviewed and updated as needed this visit by clinical staff   Tobacco       "          Reviewed and updated as needed this visit by Provider                   Past Medical History:   Diagnosis Date     Benign neoplasm of skin, site unspecified     sees derm.     Calculus of kidney     3 passed spont.     Chronic subinvolution of uterus      Disorder of bone and cartilage, unspecified     osteopenia     Dyspepsia and other specified disorders of function of stomach     dyspepsia     Generalized osteoarthrosis, unspecified site      H/O thyroid nodule     f/u with endocrinologist     Hematuria     microscopic     HTN (hypertension)      Malignant neoplasm of corpus uteri, except isthmus (H)     surgery and radiation rx.stage IC, grade 3 endometrial carcinoma.     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      Nontoxic multinodular goiter     sees endocrine     Other and unspecified hyperlipidemia    .    Past Surgical History:   Procedure Laterality Date     ABDOMEN SURGERY  2005    uterine cancer Hysterectiomy     BIOPSY      many times from nodules from thyroid     CHOLECYSTECTOMY  1992     GYN SURGERY  2005    uterine cancer     HEAD & NECK SURGERY      right thyroid taken out     [facility] NONSPECIFIC PROCEDURE  1992    Cholecystectomy. abstracted 6/24/02.     [facility] NONSPECIFIC PROCEDURE      Thyroidectomy. abstracted 6/24/02.     [facility] NONSPECIFIC PROCEDURE      Dilatation & Curettage X 2. abstracted 6/24/02.     [facility] NONSPECIFIC PROCEDURE      Cystoscopy. abstracted 6/24/02.     [facility] NONSPECIFIC PROCEDURE      hysterctomy for uterine cancer.     [facility] COLONOSCOPY THRU STOMA, DIAGNOSTIC  10/2007    due q 5 yrs         Current Outpatient Medications   Medication Sig Dispense Refill     Ascorbic Acid (VITAMIN C CR) 1000 MG TBCR        Cholecalciferol (VITAMIN D3 PO) Take 2,000 Units by mouth daily       cholestyramine light (PREVALITE) 4 GM powder Take 1 packet by mouth every 24 hours       fluticasone (FLONASE) 50  MCG/ACT nasal spray INSTILL 2 SPRAYS INTO BOTH NOSTRILS DAILY 16 g 3     hydrochlorothiazide (HYDRODIURIL) 25 MG tablet Take 1 tablet (25 mg) by mouth daily 90 tablet 1     IBANdronate (BONIVA) 150 MG tablet TAKE 1 TABLET (150 MG) BY MOUTH EVERY 30 DAYS 3 tablet 3     lisinopril (ZESTRIL) 5 MG tablet Take 1 tablet (5 mg) by mouth daily 90 tablet 1     Multiple Vitamins-Minerals (MULTIVITAMIN & MINERAL PO) Take 1 tablet by mouth daily       omega-3 fatty acids 1200 MG capsule Take 1 capsule by mouth daily 90 capsule      saccharomyces boulardii (FLORASTOR) 250 MG capsule Take 250 mg by mouth 2 times daily       simvastatin (ZOCOR) 10 MG tablet Take 1 tablet (10 mg) by mouth At Bedtime 90 tablet 3     diphenoxylate-atropine (LOMOTIL) 2.5-0.025 MG tablet Take 1 tablet by mouth 3 times daily as needed for diarrhea (Patient not taking: Reported on 7/24/2023) 90 tablet 1     loperamide (IMODIUM A-D) 2 MG tablet Take 2 mg by mouth as needed for diarrhea (Patient not taking: Reported on 7/24/2023)       vitamin B-12 (CYANOCOBALAMIN) 2000 MCG tablet  (Patient not taking: Reported on 7/24/2023)     .    Family History   Problem Relation Age of Onset     Family History Negative Mother      Other Cancer Mother      Osteoporosis Mother      Family History Negative Father      Other Cancer Father      Neurologic Disorder Sister         MS     Diabetes Brother         also, colon CA, colon surgery     Diabetes Sister      Hypertension Sister      Hyperlipidemia Sister      Anxiety Disorder Sister      Mental Illness Sister         bi polar     Obesity Sister         from some pills too     Diabetes Brother      Colon Cancer Brother      Other Cancer Brother      Anxiety Disorder Brother      Mental Illness Brother         schitzophenia     Obesity Brother         from pills too         Social History     Tobacco Use     Smoking status: Never     Smokeless tobacco: Never   Substance Use Topics     Alcohol use: No         7/23/2023      3:09 PM   Alcohol Use   Prescreen: >3 drinks/day or >7 drinks/week? Not Applicable       Do you have a current opioid prescription? No  Do you use any other controlled substances or medications that are not prescribed by a provider? None    Hyperlipidemia Follow-Up    Are you regularly taking any medication or supplement to lower your cholesterol?   Yes- simvastatin  Are you having muscle aches or other side effects that you think could be caused by your cholesterol lowering medication?  No    Hypertension Follow-up    Do you check your blood pressure regularly outside of the clinic? Yes   Are you following a low salt diet? Yes  Are your blood pressures ever more than 140 on the top number (systolic) OR more   than 90 on the bottom number (diastolic), for example 140/90? No    Prediabetes: Last A1c normal    Osteoporosis: On Boniva once a month tolerating well    History of thyroid nodules: Follows up with endocrinologist    History of IBS with diarrhea: Follows up with gastroenterologist    Current providers sharing in care for this patient include:   Patient Care Team:  Monica Sultana MD as PCP - General  Monica Sultana MD as Assigned PCP    The following health maintenance items are reviewed in Epic and correct as of today:  Health Maintenance   Topic Date Due     COVID-19 Vaccine (4 - Pfizer series) 11/25/2021     ANNUAL REVIEW OF HM ORDERS  07/20/2023     MEDICARE ANNUAL WELLNESS VISIT  07/20/2023     INFLUENZA VACCINE (1) 09/01/2023     TSH W/FREE T4 REFLEX  12/30/2023     FALL RISK ASSESSMENT  07/24/2024     LIPID  07/20/2027     ADVANCE CARE PLANNING  08/22/2027     DTAP/TDAP/TD IMMUNIZATION (3 - Td or Tdap) 06/03/2029     DEXA  08/02/2036     PHQ-2 (once per calendar year)  Completed     Pneumococcal Vaccine: 65+ Years  Completed     ZOSTER IMMUNIZATION  Completed     IPV IMMUNIZATION  Aged Out     MENINGITIS IMMUNIZATION  Aged Out     MAMMO SCREENING  Discontinued      Pertinent  "mammograms are reviewed under the imaging tab.      CONSTITUTIONAL: NEGATIVE for fever, chills, change in weight  EYES: NEGATIVE for vision changes or irritation  ENT/MOUTH: NEGATIVE for ear, mouth and throat problems  RESP: NEGATIVE for significant cough or SOB  BREAST: NEGATIVE for masses, tenderness or discharge  CV: NEGATIVE for chest pain, palpitations or peripheral edema  GI; history of IBS  : NEGATIVE for frequency, dysuria, or hematuria  MUSCULOSKELETAL: Lymphedema lower extremity  NEURO: NEGATIVE for weakness, dizziness or paresthesias  ENDOCRINE: NEGATIVE for temperature intolerance, skin/hair changes  HEME: NEGATIVE for bleeding problems  PSYCHIATRIC: NEGATIVE for changes in mood or affect    OBJECTIVE:   /80   Pulse 79   Temp (!) 96.4  F (35.8  C) (Tympanic)   Resp 14   Ht 1.549 m (5' 1\")   Wt 61 kg (134 lb 8 oz)   SpO2 98%   BMI 25.41 kg/m   Estimated body mass index is 25.51 kg/m  as calculated from the following:    Height as of this encounter: 1.549 m (5' 1\").    Weight as of 6/30/23: 61.2 kg (135 lb).  Physical Exam  GENERAL APPEARANCE: healthy, alert and no distress  EYES: Eyes grossly normal to inspection, PERRL and conjunctivae and sclerae normal  HENT: ear canals and TM's normal, nose and mouth without ulcers or lesions, oropharynx clear and oral mucous membranes moist  RESP: lungs clear to auscultation - no rales, rhonchi or wheezes  BREAST: normal without masses, tenderness or nipple discharge and no palpable axillary masses or adenopathy  CV: regular rate and rhythm, normal S1 S2,   ABDOMEN: soft, nontender, no hepatosplenomegaly, no masses and bowel sounds normal  MS: Bilateral lower extremity compression socks , no calf tenderness bilaterally  NEURO: Normal strength and tone, sensory exam grossly normal, mentation intact and speech normal  PSYCH: mentation appears normal and affect normal/bright    ASSESSMENT / PLAN:       (Z00.00) Encounter for Medicare annual wellness exam  " "(primary encounter diagnosis)  Plan: CBC with platelets, Comprehensive metabolic         panel          (I10) Essential hypertension  Comment: Blood pressure stable  Plan: hydrochlorothiazide (HYDRODIURIL) 25 MG tablet,        lisinopril (ZESTRIL) 5 MG tablet refilled as directed.explained clearly about the medication,insructions and side effects.            (E78.2) Mixed hyperlipidemia  Plan: simvastatin (ZOCOR) 10 MG tablet refilled as directed            (R73.03) Prediabetes  Plan: Check Hemoglobin A1c continue to follow ADA diet and regular exercise            (Z78.0) Asymptomatic postmenopausal status  Comment: History of osteoporosis  Plan: On Boniva, check DX Hip/Pelvis/Spine            (C55) Malignant neoplasm of uterus, unspecified site (H)  Plan:             (K58.0) Irritable bowel syndrome with diarrhea  Plan: Follows up with gastroenterologist and takes as needed Imodium or Lomotil        Patient has been advised of split billing requirements and indicates understanding: Yes      COUNSELING:  Reviewed preventive health counseling, as reflected in patient instructions       Regular exercise       Healthy diet/nutrition      BMI:   Estimated body mass index is 25.51 kg/m  as calculated from the following:    Height as of this encounter: 1.549 m (5' 1\").    Weight as of 6/30/23: 61.2 kg (135 lb).         She reports that she has never smoked. She has never used smokeless tobacco.      Appropriate preventive services were discussed with this patient, including applicable screening as appropriate for cardiovascular disease, diabetes, osteopenia/osteoporosis, and glaucoma.  As appropriate for age/gender, discussed screening for colorectal cancer, prostate cancer, breast cancer, and cervical cancer. Checklist reviewing preventive services available has been given to the patient.    Reviewed patients plan of care and provided an AVS. The Basic Care Plan (routine screening as documented in Health " Maintenance) for Sophia meets the Care Plan requirement. This Care Plan has been established and reviewed with the Patient.          Monica Sultana MD  [facility]    Identified Health Risks:  Answers submitted by the patient for this visit:

## 2023-07-24 NOTE — NURSING NOTE
"/80   Pulse 79   Temp (!) 96.4  F (35.8  C) (Tympanic)   Resp 14   Ht 1.549 m (5' 1\")   Wt 61 kg (134 lb 8 oz)   SpO2 98%   BMI 25.41 kg/m      "

## 2023-07-24 NOTE — PROGRESS NOTES
[facility] [place] OFFICE NOTE    HPI:    Sophia Olivarez is a pleasant 82 year old female with whom I am familiar.  I have seen her primary regarding swelling and varicose veins of her lower extremities, last in September 2018.  I initially saw her in 2005, then in 2016 and have recommended primarily compression for her to control the edema.  Last ultrasound in August 2022 revealed no deep vein thrombosis.  The patient is also had treatment for lymphedema in the past and is currently in lymphedema therapy with progress in her swelling.    Pain does not seem to be a prominent symptom though she has varicose veins of both of her lower extremities.  Clinically, her swelling is not primarily related to her varicose veins.    She has no complaints of abdominal pain, changes in bowel habits or appetite.    PAST MEDICAL HISTORY:   Past Medical History:   Diagnosis Date    Benign neoplasm of skin, site unspecified     sees derm.    Calculus of kidney     3 passed spont.    Chronic subinvolution of uterus     Disorder of bone and cartilage, unspecified     osteopenia    Dyspepsia and other specified disorders of function of stomach     dyspepsia    Generalized osteoarthrosis, unspecified site     H/O thyroid nodule     f/u with endocrinologist    Hematuria     microscopic    HTN (hypertension)     Malignant neoplasm of corpus uteri, except isthmus (H)     surgery and radiation rx.stage IC, grade 3 endometrial carcinoma.    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     Nontoxic multinodular goiter     sees endocrine    Other and unspecified hyperlipidemia        PAST SURGICAL HISTORY:   Past Surgical History:   Procedure Laterality Date    ABDOMEN SURGERY  2005    uterine cancer Hysterectiomy    BIOPSY      many times from nodules from thyroid    CHOLECYSTECTOMY  1992    GYN SURGERY  2005    uterine cancer    HEAD & NECK  SURGERY      right thyroid taken out    [facility] NONSPECIFIC PROCEDURE  1992    Cholecystectomy. abstracted 6/24/02.    [facility] NONSPECIFIC PROCEDURE      Thyroidectomy. abstracted 6/24/02.    [facility] NONSPECIFIC PROCEDURE      Dilatation & Curettage X 2. abstracted 6/24/02.    [facility] NONSPECIFIC PROCEDURE      Cystoscopy. abstracted 6/24/02.    [facility] NONSPECIFIC PROCEDURE      hysterctomy for uterine cancer.    [facility] COLONOSCOPY THRU STOMA, DIAGNOSTIC  10/2007    due q 5 yrs       FAMILY HISTORY:   Family History   Problem Relation Age of Onset    Family History Negative Mother     Other Cancer Mother     Osteoporosis Mother     Family History Negative Father     Other Cancer Father     Neurologic Disorder Sister         MS    Diabetes Brother         also, colon CA, colon surgery    Diabetes Sister     Hypertension Sister     Hyperlipidemia Sister     Anxiety Disorder Sister     Mental Illness Sister         bi polar    Obesity Sister         from some pills too    Diabetes Brother     Colon Cancer Brother     Other Cancer Brother     Anxiety Disorder Brother     Mental Illness Brother         schitzophenia    Obesity Brother         from pills too       SOCIAL HISTORY:   Social History     Tobacco Use    Smoking status: Never    Smokeless tobacco: Never   Substance Use Topics    Alcohol use: No       Vital signs:  There were no vitals taken for this visit.    Current Outpatient Medications   Medication Sig Dispense Refill    Ascorbic Acid (VITAMIN C CR) 1000 MG TBCR       Cholecalciferol (VITAMIN D3 PO) Take 2,000 Units by mouth daily      cholestyramine light (PREVALITE) 4 GM powder Take 1 packet by mouth every 24 hours      diphenoxylate-atropine (LOMOTIL) 2.5-0.025 MG tablet Take 1 tablet by mouth 3 times daily as needed for diarrhea 90 tablet 1    fluticasone (FLONASE) 50 MCG/ACT nasal spray INSTILL 2 SPRAYS INTO BOTH NOSTRILS DAILY 16 g 3    hydrochlorothiazide (HYDRODIURIL) 25 MG tablet Take 1 tablet (25 mg) by mouth daily 90  tablet 1    IBANdronate (BONIVA) 150 MG tablet TAKE 1 TABLET (150 MG) BY MOUTH EVERY 30 DAYS 3 tablet 3    lisinopril (ZESTRIL) 5 MG tablet Take 1 tablet (5 mg) by mouth daily 90 tablet 1    loperamide (IMODIUM A-D) 2 MG tablet Take 2 mg by mouth as needed for diarrhea      Multiple Vitamins-Minerals (MULTIVITAMIN & MINERAL PO) Take 1 tablet by mouth daily      omega-3 fatty acids 1200 MG capsule Take 1 capsule by mouth daily 90 capsule     saccharomyces boulardii (FLORASTOR) 250 MG capsule Take 250 mg by mouth 2 times daily      simvastatin (ZOCOR) 10 MG tablet Take 1 tablet (10 mg) by mouth At Bedtime 90 tablet 3    vitamin B-12 (CYANOCOBALAMIN) 2000 MCG tablet          PHYSICAL EXAM:  General: Pleasant, no acute distress  Extremities:  Right lower extremity: Excess tissue deposition on the thigh with edema extending down to the ankle with a mild ankle cut off sign.  Mild edema extending into the foot.  Firmness consistent with early lipodermatosclerosis from chronic edema.  3 mm varicosity over the right patella.    Left lower extremity: Excess tissue deposition of the thigh, especially medially and distally posteriorly.  6 mm varicosities on the lateral left leg.  Trace edema of the ankle.  Early lipodermatosclerosis.    Palpable pulses in her feet.    ASSESSMENT:  Bilateral lower extremity edema with what appears to be lymphedema of her right lower extremity.  She is currently going through lymphedema therapy and is seeing progress with the swelling.    Though she has varicose veins of her left lower extremity, the edema is well controlled and she is not having significant pain or complications, therefore no intervention is indicated at this time.    She is scheduled for a venous competency study on 8/22/2023 to ensure that there is no thrombotic/obstructive process in her right lower extremity.    PLAN:  Follow through with venous competency study as scheduled     Adams Velasquez MD FACS    Dictated  using Dragon voice recognition software which may result in transcription errors

## 2023-07-25 DIAGNOSIS — I89.0 LYMPHEDEMA: Primary | ICD-10-CM

## 2023-07-28 ENCOUNTER — TELEPHONE (OUTPATIENT)
Dept: INTERNAL MEDICINE | Facility: CLINIC | Age: 83
End: 2023-07-28
Payer: COMMERCIAL

## 2023-07-28 NOTE — TELEPHONE ENCOUNTER
Order/Referral Request    Who is requesting: Pt      Orders being requested: Referral for arthritis doctor    Reason service is needed/diagnosis: Entire body hurts    When are orders needed by: ASAP    Has this been discussed with Provider: Yes    Does patient have a preference on a Group/Provider/Facility? No  Pt prefers [place] or [place].    Pt is requesting to take Ultracet for pain    Does patient have an appointment scheduled?: No    Where to send orders: N/A    Could we send this information to you in [facility] or would you prefer to receive a phone call?:   Patient would prefer a phone call   Okay to leave a detailed message?: Yes at Home number on file [phone] (home)

## 2023-08-02 NOTE — TELEPHONE ENCOUNTER
Called patient. She states that she had seen a provider about 20 years ago before for fibromyalgia and wanting to have a follow-up due to all over body pain. However, the provider has since retired. She was able to schedule an appointment with Dr. Perdue at [facility], but until October 26. She states that she signed a RAMIN for records to be sent to their office. She states her insurance does not require a referral.     Dinorah Epstein RN  [facility]

## 2023-08-04 ENCOUNTER — TRANSFERRED RECORDS (OUTPATIENT)
Dept: HEALTH INFORMATION MANAGEMENT | Facility: CLINIC | Age: 83
End: 2023-08-04
Payer: COMMERCIAL

## 2023-08-04 LAB
GLUCOSE (EXTERNAL): 91 MG/DL (ref 70–99)
HBA1C MFR BLD: 5.4 %
TSH SERPL-ACNC: 0.66 UIU/ML (ref 0.47–4.68)

## 2023-08-18 ENCOUNTER — TRANSFERRED RECORDS (OUTPATIENT)
Dept: HEALTH INFORMATION MANAGEMENT | Facility: CLINIC | Age: 83
End: 2023-08-18
Payer: COMMERCIAL

## 2023-09-05 ENCOUNTER — OFFICE VISIT (OUTPATIENT)
Dept: VASCULAR SURGERY | Facility: CLINIC | Age: 83
End: 2023-09-05
Attending: SURGERY
Payer: COMMERCIAL

## 2023-09-05 ENCOUNTER — ANCILLARY PROCEDURE (OUTPATIENT)
Dept: ULTRASOUND IMAGING | Facility: CLINIC | Age: 83
End: 2023-09-05
Attending: SURGERY
Payer: COMMERCIAL

## 2023-09-05 DIAGNOSIS — I89.0 LYMPHEDEMA: ICD-10-CM

## 2023-09-05 DIAGNOSIS — I89.0 LYMPHEDEMA: Primary | ICD-10-CM

## 2023-09-05 PROCEDURE — 99213 OFFICE O/P EST LOW 20 MIN: CPT | Performed by: SURGERY

## 2023-09-05 PROCEDURE — 93971 EXTREMITY STUDY: CPT | Mod: RT | Performed by: SURGERY

## 2023-09-05 NOTE — PROGRESS NOTES
[facility] [place] office note    Sophia Olivarez returns today for right lower extremity venous competency study.    Physical exam  Right lower extremity: 2-3+ edema of the calf extending into the ankle and foot.    Left lower extremity: 1-2+ edema of the left leg extending to the ankle and foot.    Ultrasound  Narrative & Impression     Name:  Sophia Olivarez                                                      Patient ID: [id number]  Date: 2023                                                       : 1940  Sex: female                                                                 Examined by: ROSHAN Burton RVT  Age:  82 year old                                                         Reading MD: MARIA LUISA     INDICATION:  Lymphedema.     EXAM TYPE  RIGHT LOWER EXTREMITY VENOUS DUPLEX FOR VENOUS INSUFFICIENCY  TECHNICAL SUMMARY     A duplex ultrasound study using color flow was performed, to evaluate the right lower extremity veins for valvular incompetence with the patient in a steep reversed trendelenberg.      RIGHT:     The deep veins demonstrate phasic flow, compress, and respond to augmentations.  There is no evidence of DVT.  The common femoral vein is incompetent and free of thrombus. The remaining deep veins are competent and free of thrombus.      The GSV demonstrates phasic flow, compresses, and responds to augmentations from the saphenofemoral junction to the ankle with no evidence of thrombus. The greater saphenous vein measures 6.5 mm at the saphenofemoral junction, 4.6 mm in the proximal thigh, and 3.4 mm at the knee. The GSV is incompetent from the SFJ to the proximal thigh, the distal thigh, and again from the proximal to mid calf with the greatest reflux time of 3794 milliseconds.       The AASV is incompetent ( 5.5 mm) from the saphenofemoal junction to the proximal thigh with a reflux time of 5592 milliseconds. The AASV takes a straight course for 10 cm before coursing  superficially out of the fascia and giving rise to a varicose branch measuring 4.1 mm that courses anteriorly with a reflux time of 5196 milliseconds.     The Giacomini vein is competent ( 1.7 mm) communicating with the small saphenous vein at the knee level.      The SSV demonstrates phasic flow, compresses, and responds to augmentations from the popliteal space to the ankle.  No reflux or thrombus is seen.The saphenopopliteal junction is absent.      Perforators: There is no evidence of incompetent  veins at any level.      LEFT:     The CFV demonstrates phasic flow, compresses, and responds to augmentations.  There is no DVT.          FINAL SUMMARY:  Right lower extremity:  Deep veins  1.  No deep vein thrombosis  2.  Right common femoral vein incompetence     Superficial veins  1.  Segmental, right great saphenous vein incompetence  2.  Right anterior accessory saphenous vein incompetence, the source of a varicosity on the thigh     Left lower extremity:  Normal phasicity, compression and augmentation in the left common femoral vein with no evidence of deep vein thrombosis     Incompetence Criteria:  Greater than 500 milliseconds of relfux measured in the the superficial and perforating veins and greater than 1000 milliseconds of reflux measured in the deep veins.      ROBERT Velasquez MD, FACS        Assessment:  The mild superficial insufficiency and the right great saphenous vein and right anterior sensory saphenous vein are likely not contributing significant to the swelling and, as she has no significant right lower extremity pain, I would not advocate treating it.  We discussed details of endovenous ablation including risks of bleeding, infection, nerve injury and deep vein thrombosis.    Treatment benefits of her segmental insufficiency at this time are outweighed by the risks.    Plan  Continue conservative measures with manual lymph drainage and compression.  Return on an as-needed  basis      C Ankush Velasquez MD, FACS    Dictated using Dragon voice recognition software which may result in transcription errors

## 2023-09-05 NOTE — LETTER
2023         RE: Sophia Olivarez  [address]  [facility] [address]        Dear Colleague,    Thank you for referring your patient, Sophia Olivarez, to the [facility] [place]. Please see a copy of my visit note below.    [facility] [place] office note    Sophia Olivarez returns today for right lower extremity venous competency study.    Physical exam  Right lower extremity: 2-3+ edema of the calf extending into the ankle and foot.    Left lower extremity: 1-2+ edema of the left leg extending to the ankle and foot.    Ultrasound  Narrative & Impression     Name:  Sophia Olivarez                                                      Patient ID: [id number]  Date: 2023                                                       : 1940  Sex: female                                                                 Examined by: ROSHAN Burton RVT  Age:  82 year old                                                         Reading MD: MARIA LUISA     INDICATION:  Lymphedema.     EXAM TYPE  RIGHT LOWER EXTREMITY VENOUS DUPLEX FOR VENOUS INSUFFICIENCY  TECHNICAL SUMMARY     A duplex ultrasound study using color flow was performed, to evaluate the right lower extremity veins for valvular incompetence with the patient in a steep reversed trendelenberg.      RIGHT:     The deep veins demonstrate phasic flow, compress, and respond to augmentations.  There is no evidence of DVT.  The common femoral vein is incompetent and free of thrombus. The remaining deep veins are competent and free of thrombus.      The GSV demonstrates phasic flow, compresses, and responds to augmentations from the saphenofemoral junction to the ankle with no evidence of thrombus. The greater saphenous vein measures 6.5 mm at the saphenofemoral junction, 4.6 mm in the proximal thigh, and 3.4 mm at the knee. The GSV is incompetent from the SFJ to the proximal thigh, the distal thigh, and again from the proximal  to mid calf with the greatest reflux time of 3794 milliseconds.       The AASV is incompetent ( 5.5 mm) from the saphenofemoal junction to the proximal thigh with a reflux time of 5592 milliseconds. The AASV takes a straight course for 10 cm before coursing superficially out of the fascia and giving rise to a varicose branch measuring 4.1 mm that courses anteriorly with a reflux time of 5196 milliseconds.     The Giacomini vein is competent ( 1.7 mm) communicating with the small saphenous vein at the knee level.      The SSV demonstrates phasic flow, compresses, and responds to augmentations from the popliteal space to the ankle.  No reflux or thrombus is seen.The saphenopopliteal junction is absent.      Perforators: There is no evidence of incompetent  veins at any level.      LEFT:     The CFV demonstrates phasic flow, compresses, and responds to augmentations.  There is no DVT.          FINAL SUMMARY:  Right lower extremity:  Deep veins  1.  No deep vein thrombosis  2.  Right common femoral vein incompetence     Superficial veins  1.  Segmental, right great saphenous vein incompetence  2.  Right anterior accessory saphenous vein incompetence, the source of a varicosity on the thigh     Left lower extremity:  Normal phasicity, compression and augmentation in the left common femoral vein with no evidence of deep vein thrombosis     Incompetence Criteria:  Greater than 500 milliseconds of relfux measured in the the superficial and perforating veins and greater than 1000 milliseconds of reflux measured in the deep veins.      ROBERT Velasquez MD, FACS        Assessment:  The mild superficial insufficiency and the right great saphenous vein and right anterior sensory saphenous vein are likely not contributing significant to the swelling and, as she has no significant right lower extremity pain, I would not advocate treating it.  We discussed details of endovenous ablation including risks of bleeding,  infection, nerve injury and deep vein thrombosis.    Treatment benefits of her segmental insufficiency at this time are outweighed by the risks.    Plan  Continue conservative measures with manual lymph drainage and compression.  Return on an as-needed basis      ROBERT Velasquez MD, FACS    Dictated using Dragon voice recognition software which may result in transcription errors             Again, thank you for allowing me to participate in the care of your patient.        Sincerely,        Adams Velasquez MD

## 2023-09-06 ENCOUNTER — THERAPY VISIT (OUTPATIENT)
Dept: OCCUPATIONAL THERAPY | Facility: CLINIC | Age: 83
End: 2023-09-06
Payer: COMMERCIAL

## 2023-09-06 DIAGNOSIS — I89.0 LYMPHEDEMA: Primary | ICD-10-CM

## 2023-09-06 PROCEDURE — 97140 MANUAL THERAPY 1/> REGIONS: CPT | Mod: GO | Performed by: OCCUPATIONAL THERAPIST

## 2023-09-08 ENCOUNTER — ANCILLARY PROCEDURE (OUTPATIENT)
Dept: BONE DENSITY | Facility: CLINIC | Age: 83
End: 2023-09-08
Attending: INTERNAL MEDICINE
Payer: COMMERCIAL

## 2023-09-08 DIAGNOSIS — Z78.0 ASYMPTOMATIC POSTMENOPAUSAL STATUS: ICD-10-CM

## 2023-09-08 PROCEDURE — 77085 DXA BONE DENSITY AXL VRT FX: CPT | Performed by: INTERNAL MEDICINE

## 2023-09-12 ENCOUNTER — TELEPHONE (OUTPATIENT)
Dept: INTERNAL MEDICINE | Facility: CLINIC | Age: 83
End: 2023-09-12
Payer: COMMERCIAL

## 2023-09-12 NOTE — TELEPHONE ENCOUNTER
"Patient calls to get DEXA results from 09/08/2023. Patient also wondering if her Boniva could be causing her \"bones to ache\". Patient would like provider to advise on DEXA results and if patient should continue her Boniva. Patient states she is aching in her pelvic area, bilateral legs and lower back. Patient says she knows it's not arthritis. Pain has been ongoing since May.     Patient was told by pharmacist told her the Boniva could be causing the body aching.     Please advise.    Thank you,  Agustín Holguin, Triage RN [place]  3:06 PM 9/12/2023         "

## 2023-09-13 NOTE — TELEPHONE ENCOUNTER
Called and spoke with patient to relay provider message. Patient verbalizes understanding of instructions and indicates no further questions at this time.    Thank you,  Agustín Holguin, Triage RN [facility]  8:11 AM 9/13/2023

## 2023-09-13 NOTE — TELEPHONE ENCOUNTER
Please inform pt that Dexa has not been read, no report ,  will sent results in MY chart when it is available . We can discuss about Boniva after dexa results. Pl inform pt

## 2023-09-14 ENCOUNTER — TELEPHONE (OUTPATIENT)
Dept: INTERNAL MEDICINE | Facility: CLINIC | Age: 83
End: 2023-09-14
Payer: COMMERCIAL

## 2023-09-14 NOTE — TELEPHONE ENCOUNTER
Patient calling   She see's her dexa results and wants to know what she should do. Please advise. Ok to call and dann [phone]

## 2023-09-18 ENCOUNTER — TELEPHONE (OUTPATIENT)
Dept: INTERNAL MEDICINE | Facility: CLINIC | Age: 83
End: 2023-09-18
Payer: COMMERCIAL

## 2023-09-18 NOTE — TELEPHONE ENCOUNTER
[employer] * pneumatic compression device order received via fax     Forms in Dr. mail box for review and signature.

## 2023-09-19 ENCOUNTER — VIRTUAL VISIT (OUTPATIENT)
Dept: INTERNAL MEDICINE | Facility: CLINIC | Age: 83
End: 2023-09-19
Payer: COMMERCIAL

## 2023-09-19 DIAGNOSIS — I89.0 LYMPHEDEMA: ICD-10-CM

## 2023-09-19 DIAGNOSIS — M81.0 OSTEOPOROSIS WITHOUT CURRENT PATHOLOGICAL FRACTURE, UNSPECIFIED OSTEOPOROSIS TYPE: Primary | ICD-10-CM

## 2023-09-19 PROCEDURE — 99214 OFFICE O/P EST MOD 30 MIN: CPT | Mod: VID | Performed by: INTERNAL MEDICINE

## 2023-09-19 NOTE — PROGRESS NOTES
Vashti is a 82 year old who is being evaluated via a billable video visit.      How would you like to obtain your AVS? MyChart  If the video visit is dropped, the invitation should be resent by: Text to cell phone: [phone]  Will anyone else be joining your video visit? No          Assessment & Plan     (M81.0) Osteoporosis without current pathological fracture, unspecified osteoporosis type  (primary encounter diagnosis)  Plan: Patient is currently on Boniva 150 mg once a month, not missing any doses.  Patient does not follow-up with endocrinologist for thyroid nodules.  Currently taking calcium supplements 800 mg daily along with 2000 units of vitamin D.  Discussed about bone density results with patient and advised referral to see endocrinologist to discuss alternate medications like Prolia as she has significant decrease in bone density of lumbar spine given while taking Boniva.  Patient states that she has endocrine endocrinologist already and will follow-up with them.    (I89.0) Lymphedema  Plan: Follows up with lymphedema clinic and using compression stockings and lymphedema wraps    Pelvic girdle pain/muscle aches; follows up with rheumatologist, patient was advised to try over-the-counter Tylenol arthritis for the pain as directed.    Review of the result(s) of each unique test - DEXA  >30  minutes spent by me on the date of the encounter doing chart review, history and exam, documentation and further activities per the note       Monica Sultana MD  [facility]    Deb Kingston is a 82 year old, presenting for the following health issues:  Follow Up (Dexa results)        9/19/2023    10:26 AM   Additional Questions   Roomed by kelly   Accompanied by self         9/19/2023    10:26 AM   Patient Reported Additional Medications   Patient reports taking the following new medications discontinued B12       History of Present Illness       Reason for visit:  Body has  symptoms not normal  for a long timeShe consumes 2 sweetened beverage(s) daily.She exercises with enough effort to increase her heart rate 10 to 19 minutes per day.  She exercises with enough effort to increase her heart rate 4 days per week.   She is taking medications regularly.       Pt is a 82 year old female who present for virtual visit today to discuss recent Dexa results.  Patient has history of osteoporosis and is on Boniva once a month but the recent bone density shows significant decrease of the bone density in the lumbar spine.  Takes over-the-counter calcium supplements 800 mg/day and takes vitamin D 2000 units daily    Pt also c/o muscle aches and pains mainly pelvic girdle and upper thighs since 05/23 .  Patient was seen by rheumatologist, had multiple blood work and x-rays.    Patient has history of lymphedema bilateral lower extremity: Follows up with the lymphedema clinic and has compression stockings and wraps.      Past Medical History:   Diagnosis Date    Benign neoplasm of skin, site unspecified     sees derm.    Calculus of kidney     3 passed spont.    Chronic subinvolution of uterus     Disorder of bone and cartilage, unspecified     osteopenia    Dyspepsia and other specified disorders of function of stomach     dyspepsia    Generalized osteoarthrosis, unspecified site     H/O thyroid nodule     f/u with endocrinologist    Hematuria     microscopic    HTN (hypertension)     Malignant neoplasm of corpus uteri, except isthmus (H)     surgery and radiation rx.stage IC, grade 3 endometrial carcinoma.    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     Nontoxic multinodular goiter     sees endocrine    Other and unspecified hyperlipidemia        Current Outpatient Medications   Medication Sig Dispense Refill    Ascorbic Acid (VITAMIN C CR) 1000 MG TBCR       Cholecalciferol (VITAMIN D3 PO) Take 2,000 Units by  mouth daily      cholestyramine light (PREVALITE) 4 GM powder Take 1 packet by mouth every 24 hours      fluticasone (FLONASE) 50 MCG/ACT nasal spray INSTILL 2 SPRAYS INTO BOTH NOSTRILS DAILY 16 g 3    hydrochlorothiazide (HYDRODIURIL) 25 MG tablet Take 1 tablet (25 mg) by mouth daily 90 tablet 1    lisinopril (ZESTRIL) 5 MG tablet Take 1 tablet (5 mg) by mouth daily 90 tablet 1    Multiple Vitamins-Minerals (MULTIVITAMIN & MINERAL PO) Take 1 tablet by mouth daily      omega-3 fatty acids 1200 MG capsule Take 1 capsule by mouth daily 90 capsule     saccharomyces boulardii (FLORASTOR) 250 MG capsule Take 250 mg by mouth 2 times daily      simvastatin (ZOCOR) 10 MG tablet Take 1 tablet (10 mg) by mouth At Bedtime 90 tablet 3    diphenoxylate-atropine (LOMOTIL) 2.5-0.025 MG tablet Take 1 tablet by mouth 3 times daily as needed for diarrhea (Patient not taking: Reported on 9/19/2023) 90 tablet 1    IBANdronate (BONIVA) 150 MG tablet TAKE 1 TABLET (150 MG) BY MOUTH EVERY 30 DAYS (Patient not taking: Reported on 9/19/2023) 3 tablet 3    loperamide (IMODIUM A-D) 2 MG tablet Take 2 mg by mouth as needed for diarrhea (Patient not taking: Reported on 9/19/2023)      vitamin B-12 (CYANOCOBALAMIN) 2000 MCG tablet  (Patient not taking: Reported on 9/19/2023)              Review of Systems   CONSTITUTIONAL: NEGATIVE for fever, chills,    RESP: NEGATIVE for significant cough or SOB  CV: NEGATIVE for chest pain, palpitations   MUSCULOSKELETAL: Muscle aches      Objective           Vitals:  No vitals were obtained today due to virtual visit.    Physical Exam   GENERAL:   alert and no distress  EYES: Eyes grossly normal to inspection.  No discharge or erythema, or obvious scleral/conjunctival abnormalities.  RESP: No audible wheeze, cough, or visible cyanosis.  No visible retractions or increased work of breathing.    PSYCH: Mentation appears normal, affect normal/bright, judgement and insight intact, normal speech and appearance  well-groomed.       Video-Visit Details    Type of service:  Video Visit   Video Start Time: 10:29 AM  Video End Time:10:44 AM    Originating Location (pt. Location): Home    Distant Location (provider location):  On-site  Platform used for Video Visit: Kenia

## 2023-09-20 ENCOUNTER — TELEPHONE (OUTPATIENT)
Dept: VASCULAR SURGERY | Facility: CLINIC | Age: 83
End: 2023-09-20
Payer: COMMERCIAL

## 2023-09-20 NOTE — TELEPHONE ENCOUNTER
I did discuss this with the patient yesterday patient using only compression stockings and lymphedema wraps at this time.  I am not sure who recommended for her to use the medical device.  I have not ordered this please inform [facility]

## 2023-09-20 NOTE — TELEPHONE ENCOUNTER
Called and spoke with Jennifer from [facility] to relay provider message. Jennifer said that this device was was recommended by lymphedema therapist, Gianna Fuentes at [facility] so patient can continue to control her lympedema from home. - [phone]. Since Gianna Fuentes is not a provider, she is unable to sign off on order, so primary care provider would need to sign off.    Gianna Fuentes at [facility] - [phone].    Would you like us to contact Gianna about device?    Thank you,  Agustín Holguin, Triage RN [facility]  3:46 PM 9/20/2023

## 2023-09-20 NOTE — TELEPHONE ENCOUNTER
Jennifer from [employer] calls to see if provider received forms yet. She states they are urgent and need them to finish shipping patient items.     Jennifer can be contacted at [phone] for any additional concerns.    Thank you,  Agustín Holguin, Triage RN [facility]  1:37 PM 9/20/2023

## 2023-09-20 NOTE — TELEPHONE ENCOUNTER
Pt called and told she should order the lymphedema pump and it will help her symptoms and not interfere but help her veins. She had no further questions,Guille Murphy RN

## 2023-09-20 NOTE — TELEPHONE ENCOUNTER
Patient last seen 9/5/23 and was recommended to do conservative therapy and follow up as needed. She's calling today wondering if using the lymphedema pump will interfere with her veins. She does need to order this within 3 days. Please get back to her as soon as possible.

## 2023-09-21 ENCOUNTER — MEDICAL CORRESPONDENCE (OUTPATIENT)
Dept: HEALTH INFORMATION MANAGEMENT | Facility: CLINIC | Age: 83
End: 2023-09-21

## 2023-09-26 ENCOUNTER — TRANSFERRED RECORDS (OUTPATIENT)
Dept: HEALTH INFORMATION MANAGEMENT | Facility: CLINIC | Age: 83
End: 2023-09-26
Payer: COMMERCIAL

## 2023-09-28 ENCOUNTER — TRANSCRIBE ORDERS (OUTPATIENT)
Dept: OTHER | Age: 83
End: 2023-09-28

## 2023-09-28 DIAGNOSIS — M16.0 PRIMARY OSTEOARTHRITIS OF BOTH HIPS: Primary | ICD-10-CM

## 2023-10-05 ENCOUNTER — THERAPY VISIT (OUTPATIENT)
Dept: PHYSICAL THERAPY | Facility: CLINIC | Age: 83
End: 2023-10-05
Payer: COMMERCIAL

## 2023-10-05 DIAGNOSIS — M67.959 TENDINOPATHY OF GLUTEAL REGION: Primary | ICD-10-CM

## 2023-10-05 DIAGNOSIS — M16.0 PRIMARY OSTEOARTHRITIS OF BOTH HIPS: ICD-10-CM

## 2023-10-05 PROCEDURE — 97161 PT EVAL LOW COMPLEX 20 MIN: CPT | Mod: GP | Performed by: PHYSICAL THERAPIST

## 2023-10-05 PROCEDURE — 97110 THERAPEUTIC EXERCISES: CPT | Mod: GP | Performed by: PHYSICAL THERAPIST

## 2023-10-05 ASSESSMENT — ACTIVITIES OF DAILY LIVING (ADL)
WALKING_UP_STEEP_HILLS: NO DIFFICULTY AT ALL
GOING DOWN 1 FLIGHT OF STAIRS: NO DIFFICULTY AT ALL
WALKING_DOWN_STEEP_HILLS: NO DIFFICULTY AT ALL
LIGHT_TO_MODERATE_WORK: SLIGHT DIFFICULTY
GETTING_INTO_AND_OUT_OF_A_BATHTUB: NO DIFFICULTY AT ALL
HOS_ADL_SCORE(%): 88.33
ROLLING OVER IN BED: MODERATE DIFFICULTY
HOS_ADL_ITEM_SCORE_TOTAL: 53
DEEP SQUATTING: NO DIFFICULTY AT ALL
HOS_ADL_HIGHEST_POTENTIAL_SCORE: 60
RECREATIONAL ACTIVITIES: MODERATE DIFFICULTY
WALKING_APPROXIMATELY_10_MINUTES: NO DIFFICULTY AT ALL
PUTTING ON SOCKS AND SHOES: NO DIFFICULTY AT ALL
WALKING_15_MINUTES_OR_GREATER: NO DIFFICULTY AT ALL
GOING UP 1 FLIGHT OF STAIRS: NO DIFFICULTY AT ALL
STANDING FOR 15 MINUTES: NO DIFFICULTY AT ALL
STEPPING UP AND DOWN CURBS: NO DIFFICULTY AT ALL
HEAVY_WORK: MODERATE DIFFICULTY
SITTING FOR 15 MINUTES: NO DIFFICULTY AT ALL
GETTING INTO AND OUT OF AN AVERAGE CAR: NO DIFFICULTY AT ALL
HOW_WOULD_YOU_RATE_YOUR_CURRENT_LEVEL_OF_FUNCTION_DURING_YOUR_USUAL_ACTIVITIES_OF_DAILY_LIVING_FROM_0_TO_100_WITH_100_BEING_YOUR_LEVEL_OF_FUNCTION_PRIOR_TO_YOUR_HIP_PROBLEM_AND_0_BEING_THE_INABILITY_TO_PERFORM_ANY_OF_YOUR_USUAL_DAILY_ACTIVITIES?: 25

## 2023-10-05 NOTE — PROGRESS NOTES
"PHYSICAL THERAPY EVALUATION  Type of Visit: Evaluation    See electronic medical record for Abuse and Falls Screening details.    Subjective       Presenting condition or subjective complaint: Pt presents with complaints of bilateral hip/buttock pain. Pt reported onset of sx's approximately several months ago. Pt reported long history of lymphedema however indicated she was able to walk at the mall without provocation of hips sx's. Pt reported her ability to walk has decreased from 3 floors of walking to 1; pt reports she \"pushes\" through discomfort. Pt reported she continued to work in her yard this past summer but again with \"pain.\"  Date of onset: 10/05/23    Relevant medical history: -- (See epic)   Dates & types of surgery:      Prior diagnostic imaging/testing results:       Prior therapy history for the same diagnosis, illness or injury: No      Prior Level of Function  Transfers: Independent  Ambulation: Independent  ADL: Independent  IADL: Housekeeping, Laundry, Meal preparation    Living Environment  Social support: With a significant other or spouse   Type of home: House   Stairs to enter the home: Yes 7 Is there a railing: Yes   Ramp: No   Stairs inside the home: Yes   Is there a railing: Yes   Help at home: None  Equipment owned:       Employment: No    Hobbies/Interests:      Patient goals for therapy: Walk without pain.     Objective   HIP EVALUATION  PAIN: Pain Level at Rest: 0/10  Pain Location: lateral/posterior hips  Pain Frequency: intermittent  Pain is Exacerbated By: walking  INTEGUMENTARY (edema, incisions):  reported history of lymphedema; currently using leg wrap on R LE (ankle to proximal thigh) and compression stocking on the L  GAIT:   Weightbearing Status:  full weight bearing  Assistive Device(s): None  Gait Deviations: WFL  BALANCE/PROPRIOCEPTION: Single Leg Stance Eyes Open (seconds): ~5-6 seconds L/R    ROM:  PROM R hip: WFL's for flexion/ER/IR; PROM L hip: WFL's for flexion and ER; " ~20 degrees IR vs 30 degrees on the R.  LROM: flexion: WFL's without provocation of sx's; extension: WFL's without provocation of sx's.  STRENGTH:  Hip flexion: 4/5 B; hip abduction: 3/5 B; hip ER: 5/5 B  LE FLEXIBILITY: WFL  SPECIAL TESTS:  significant loss of motion with MARILYNN test R/L   PALPATION:  tender posterolateral hip on the R/L    Assessment & Plan   CLINICAL IMPRESSIONS  Medical Diagnosis: Primary osteoarthritis of both hips    Treatment Diagnosis: B gluteal tendinopathy   Impression/Assessment: Patient is a 82 year old female with B hip/buttock complaints.  The following significant findings have been identified: Pain, Decreased strength, Impaired muscle performance, and Decreased activity tolerance. These impairments interfere with their ability to perform household mobility, community mobility, and yardwork  as compared to previous level of function.     Clinical Decision Making (Complexity):  Clinical Presentation: Stable/Uncomplicated  Clinical Presentation Rationale: based on medical and personal factors listed in PT evaluation  Clinical Decision Making (Complexity): Low complexity    PLAN OF CARE  Treatment Interventions:  Interventions: Neuromuscular Re-education, Therapeutic Activity, Therapeutic Exercise, Self-Care/Home Management    Long Term Goals     PT Goal 1  Goal Identifier: Ambulation  Goal Description: Ambulate for 15 minutes without provocation of B hip sx's.  Rationale: to maximize safety and independence within the community  Target Date: 11/30/23      Frequency of Treatment: 1x/week for 4 weeks, tapering to every other week for 4 weeks  Duration of Treatment: 8 weeks    Education Assessment:        Risks and benefits of evaluation/treatment have been explained.   Patient/Family/caregiver agrees with Plan of Care.     Evaluation Time:           Signing Clinician: Mary Kate Badlwin, PT

## 2023-10-13 ENCOUNTER — THERAPY VISIT (OUTPATIENT)
Dept: PHYSICAL THERAPY | Facility: CLINIC | Age: 83
End: 2023-10-13
Payer: COMMERCIAL

## 2023-10-13 DIAGNOSIS — M67.959 TENDINOPATHY OF GLUTEAL REGION: Primary | ICD-10-CM

## 2023-10-13 PROCEDURE — 97110 THERAPEUTIC EXERCISES: CPT | Mod: GP | Performed by: PHYSICAL THERAPIST

## 2023-10-13 PROCEDURE — 97112 NEUROMUSCULAR REEDUCATION: CPT | Mod: GP | Performed by: PHYSICAL THERAPIST

## 2023-10-19 ENCOUNTER — THERAPY VISIT (OUTPATIENT)
Dept: PHYSICAL THERAPY | Facility: CLINIC | Age: 83
End: 2023-10-19
Payer: COMMERCIAL

## 2023-10-19 DIAGNOSIS — M67.959 TENDINOPATHY OF GLUTEAL REGION: Primary | ICD-10-CM

## 2023-10-19 PROCEDURE — 97112 NEUROMUSCULAR REEDUCATION: CPT | Mod: GP | Performed by: PHYSICAL THERAPIST

## 2023-10-19 PROCEDURE — 97110 THERAPEUTIC EXERCISES: CPT | Mod: GP | Performed by: PHYSICAL THERAPIST

## 2023-10-30 ENCOUNTER — TRANSFERRED RECORDS (OUTPATIENT)
Dept: HEALTH INFORMATION MANAGEMENT | Facility: CLINIC | Age: 83
End: 2023-10-30
Payer: COMMERCIAL

## 2023-10-30 LAB
CREATININE (EXTERNAL): 0.56 MG/DL (ref 0.52–1.04)
GFR ESTIMATED (EXTERNAL): >60 ML/MIN/1.7
GLUCOSE (EXTERNAL): 91 MG/DL (ref 70–99)
POTASSIUM (EXTERNAL): 3.8 MMOL/L (ref 3.5–5.1)

## 2023-10-31 ENCOUNTER — MEDICAL CORRESPONDENCE (OUTPATIENT)
Dept: INFUSION THERAPY | Facility: CLINIC | Age: 83
End: 2023-10-31

## 2023-11-01 ENCOUNTER — THERAPY VISIT (OUTPATIENT)
Dept: OCCUPATIONAL THERAPY | Facility: CLINIC | Age: 83
End: 2023-11-01
Payer: COMMERCIAL

## 2023-11-01 DIAGNOSIS — I89.0 LYMPHEDEMA: Primary | ICD-10-CM

## 2023-11-01 PROCEDURE — 97140 MANUAL THERAPY 1/> REGIONS: CPT | Mod: GO | Performed by: OCCUPATIONAL THERAPIST

## 2023-11-02 ENCOUNTER — TELEPHONE (OUTPATIENT)
Dept: INTERNAL MEDICINE | Facility: CLINIC | Age: 83
End: 2023-11-02
Payer: COMMERCIAL

## 2023-11-02 NOTE — TELEPHONE ENCOUNTER
Patient calling regarding hydrochlorothiazide pills. She is now on a lymphedema pump and does not think she needs to take hydrochlorothiazide any longer.     She also thinks she was told to have a follow up DEXA scan by her pain provider but isn't sure if that is needed or not.    Please advise patient on these questions

## 2023-11-02 NOTE — TELEPHONE ENCOUNTER
Patient informed of primary care provider's message below.  Reports the endocrinologist mentioned an injection medication.  She will follow up with endocrin.

## 2023-11-02 NOTE — TELEPHONE ENCOUNTER
Okay to stop hydrochlorothiazide, her bone density showed osteoporosis in spite of being on Boniva and we did discuss during her virtual visit that she needs to be seen by her endocrinologist to discuss alternate medications like Prolia or other injectables.  Please inform patient to follow-up with her endocrinologist

## 2023-11-03 ENCOUNTER — TRANSCRIBE ORDERS (OUTPATIENT)
Dept: ONCOLOGY | Facility: CLINIC | Age: 83
End: 2023-11-03
Payer: COMMERCIAL

## 2023-11-03 DIAGNOSIS — M81.0 SENILE OSTEOPOROSIS: Primary | ICD-10-CM

## 2023-11-03 RX ORDER — EPINEPHRINE 1 MG/ML
0.3 INJECTION, SOLUTION INTRAMUSCULAR; SUBCUTANEOUS EVERY 5 MIN PRN
Status: CANCELLED | OUTPATIENT
Start: 2023-11-21

## 2023-11-03 RX ORDER — HEPARIN SODIUM,PORCINE 10 UNIT/ML
5-20 VIAL (ML) INTRAVENOUS DAILY PRN
Status: CANCELLED | OUTPATIENT
Start: 2023-11-21

## 2023-11-03 RX ORDER — ZOLEDRONIC ACID 5 MG/100ML
5 INJECTION, SOLUTION INTRAVENOUS ONCE
Status: CANCELLED
Start: 2023-11-21

## 2023-11-03 RX ORDER — MEPERIDINE HYDROCHLORIDE 25 MG/ML
25 INJECTION INTRAMUSCULAR; INTRAVENOUS; SUBCUTANEOUS EVERY 30 MIN PRN
Status: CANCELLED | OUTPATIENT
Start: 2023-11-21

## 2023-11-03 RX ORDER — METHYLPREDNISOLONE SODIUM SUCCINATE 125 MG/2ML
125 INJECTION, POWDER, LYOPHILIZED, FOR SOLUTION INTRAMUSCULAR; INTRAVENOUS
Status: CANCELLED
Start: 2023-11-21

## 2023-11-03 RX ORDER — DIPHENHYDRAMINE HYDROCHLORIDE 50 MG/ML
50 INJECTION INTRAMUSCULAR; INTRAVENOUS
Status: CANCELLED
Start: 2023-11-21

## 2023-11-03 RX ORDER — ALBUTEROL SULFATE 90 UG/1
1-2 AEROSOL, METERED RESPIRATORY (INHALATION)
Status: CANCELLED
Start: 2023-11-21

## 2023-11-03 RX ORDER — ALBUTEROL SULFATE 0.83 MG/ML
2.5 SOLUTION RESPIRATORY (INHALATION)
Status: CANCELLED | OUTPATIENT
Start: 2023-11-21

## 2023-11-03 RX ORDER — HEPARIN SODIUM (PORCINE) LOCK FLUSH IV SOLN 100 UNIT/ML 100 UNIT/ML
5 SOLUTION INTRAVENOUS
Status: CANCELLED | OUTPATIENT
Start: 2023-11-21

## 2023-11-21 ENCOUNTER — INFUSION THERAPY VISIT (OUTPATIENT)
Dept: INFUSION THERAPY | Facility: CLINIC | Age: 83
End: 2023-11-21
Attending: INTERNAL MEDICINE
Payer: COMMERCIAL

## 2023-11-21 VITALS
HEIGHT: 60 IN | TEMPERATURE: 97.5 F | WEIGHT: 133 LBS | HEART RATE: 83 BPM | OXYGEN SATURATION: 96 % | BODY MASS INDEX: 26.11 KG/M2 | RESPIRATION RATE: 18 BRPM | SYSTOLIC BLOOD PRESSURE: 148 MMHG | DIASTOLIC BLOOD PRESSURE: 77 MMHG

## 2023-11-21 DIAGNOSIS — M81.0 SENILE OSTEOPOROSIS: Primary | ICD-10-CM

## 2023-11-21 PROCEDURE — 250N000011 HC RX IP 250 OP 636: Mod: JZ | Performed by: INTERNAL MEDICINE

## 2023-11-21 PROCEDURE — 96374 THER/PROPH/DIAG INJ IV PUSH: CPT

## 2023-11-21 RX ORDER — ZOLEDRONIC ACID 5 MG/100ML
5 INJECTION, SOLUTION INTRAVENOUS ONCE
Status: COMPLETED | OUTPATIENT
Start: 2023-11-21 | End: 2023-11-21

## 2023-11-21 RX ORDER — DIPHENHYDRAMINE HYDROCHLORIDE 50 MG/ML
50 INJECTION INTRAMUSCULAR; INTRAVENOUS
Start: 2024-11-20

## 2023-11-21 RX ORDER — ALBUTEROL SULFATE 0.83 MG/ML
2.5 SOLUTION RESPIRATORY (INHALATION)
OUTPATIENT
Start: 2024-11-20

## 2023-11-21 RX ORDER — MEPERIDINE HYDROCHLORIDE 25 MG/ML
25 INJECTION INTRAMUSCULAR; INTRAVENOUS; SUBCUTANEOUS EVERY 30 MIN PRN
OUTPATIENT
Start: 2024-11-20

## 2023-11-21 RX ORDER — METHYLPREDNISOLONE SODIUM SUCCINATE 125 MG/2ML
125 INJECTION, POWDER, LYOPHILIZED, FOR SOLUTION INTRAMUSCULAR; INTRAVENOUS
Start: 2024-11-20

## 2023-11-21 RX ORDER — HEPARIN SODIUM,PORCINE 10 UNIT/ML
5-20 VIAL (ML) INTRAVENOUS DAILY PRN
OUTPATIENT
Start: 2024-11-20

## 2023-11-21 RX ORDER — ALBUTEROL SULFATE 90 UG/1
1-2 AEROSOL, METERED RESPIRATORY (INHALATION)
Start: 2024-11-20

## 2023-11-21 RX ORDER — ZOLEDRONIC ACID 5 MG/100ML
5 INJECTION, SOLUTION INTRAVENOUS ONCE
Start: 2024-11-20

## 2023-11-21 RX ORDER — HEPARIN SODIUM (PORCINE) LOCK FLUSH IV SOLN 100 UNIT/ML 100 UNIT/ML
5 SOLUTION INTRAVENOUS
OUTPATIENT
Start: 2024-11-20

## 2023-11-21 RX ORDER — EPINEPHRINE 1 MG/ML
0.3 INJECTION, SOLUTION INTRAMUSCULAR; SUBCUTANEOUS EVERY 5 MIN PRN
OUTPATIENT
Start: 2024-11-20

## 2023-11-21 RX ADMIN — ZOLEDRONIC ACID 5 MG: 0.05 INJECTION, SOLUTION INTRAVENOUS at 13:50

## 2023-11-21 ASSESSMENT — PAIN SCALES - GENERAL: PAINLEVEL: NO PAIN (0)

## 2023-11-21 NOTE — PROGRESS NOTES
Infusion Nursing Note:  Sophia Olivarez presents today for Reclast.    Patient seen by provider today: No   present during visit today: Not Applicable.    Note: Reviewed Reclast handout and the potential side effects with patient. Instructed patient to call her primary MD with any concerns after discharge. All questions answered, and patient verbalized understanding to the above.      Intravenous Access:  Peripheral IV placed.    Treatment Conditions:  See lab values from 10/30/23.      Post Infusion Assessment:  Patient tolerated infusion without incident.  Blood return noted pre and post infusion.  Site patent and intact, free from redness, edema or discomfort.  No evidence of extravasations.  Access discontinued per protocol.       Discharge Plan:   Discharge instructions reviewed with: Patient.  Patient and/or family verbalized understanding of discharge instructions and all questions answered.  Copy of AVS reviewed with patient and/or family.  Patient will return in one year for next appointment.  Patient discharged in stable condition accompanied by: .  Departure Mode: Ambulatory.      Susan Alcantara RN

## 2023-12-06 ENCOUNTER — THERAPY VISIT (OUTPATIENT)
Dept: OCCUPATIONAL THERAPY | Facility: CLINIC | Age: 83
End: 2023-12-06
Payer: COMMERCIAL

## 2023-12-06 DIAGNOSIS — I89.0 LYMPHEDEMA: Primary | ICD-10-CM

## 2023-12-06 PROCEDURE — 97140 MANUAL THERAPY 1/> REGIONS: CPT | Mod: GO | Performed by: OCCUPATIONAL THERAPIST

## 2023-12-26 PROBLEM — M67.959 TENDINOPATHY OF GLUTEAL REGION: Status: RESOLVED | Noted: 2023-10-05 | Resolved: 2023-12-26

## 2023-12-26 NOTE — PROGRESS NOTES
"    DISCHARGE  Reason for Discharge: Patient chooses to discontinue therapy.  Pt completed 3 sessions of PT. Pt cancelled remaining appts due to \"discomfort.\"      Discharge Plan: Discharge.   10/19/23 0500   Appointment Info   Signing clinician's name / credentials Mary Kate Baldwin PT   Total/Authorized Visits 6   Visits Used 3   Medical Diagnosis Primary osteoarthritis of both hips   PT Tx Diagnosis B gluteal tendinopathy   Progress Note/Certification   Onset of illness/injury or Date of Surgery 10/05/23   Therapy Frequency 1x/week for 4 weeks, tapering to every other week for 4 weeks   Predicted Duration 8 weeks   PT Goal 1   Goal Identifier Ambulation   Goal Description Ambulate for 15 minutes without provocation of B hip sx's.   Rationale to maximize safety and independence within the community   Target Date 11/30/23   Subjective Report   Subjective Report Walking 1.5 laps at the mall with less discomfort.   Objective Measures   Objective Measures Objective Measure 1;Objective Measure 2;Objective Measure 3   Objective Measure 1   Objective Measure balance   Details EO 2-3 seconds   Objective Measure 2   Objective Measure PROM hips   Details R Hip flexion: WFL's; ER: 24 degrees; IR: 11 degrees. L hip: flexion: WFL's; ER: 30 degrees; IR: 20 degrees.   Objective Measure 3   Objective Measure MMT   Details R hip abd: 3/5; ER: 5/5.   Treatment Interventions (PT)   Interventions Therapeutic Procedure/Exercise;Neuromuscular Re-education   Therapeutic Procedure/Exercise   Therapeutic Procedures: strength, endurance, ROM, flexibillity minutes (86102) 30   Ther Proc 1 Hip Abduction   Ther Proc 1 - Details Reviewed standing hip abduction with use of yellow TB, VC and MC for form, continue w/2 sets of 10 reps   PTRx Ther Proc 2 Knee Bends   PTRx Ther Proc 2 - Details Changed to partial squat at edge of bed (23\"). VC, VSC and MC for form, goal 10 reps, up to 2x/day   PTRx Ther Proc 3 Toe Raises  (HEP)   PTRx Ther Proc 3 - " Details 20x   PTRx Ther Proc 4 Shoulder Theraband Rows  (not today)   PTRx Ther Proc 4 - Details red x 20   PTRx Ther Proc 5 Shoulder Theraband Extension  (not today)   PTRx Ther Proc 5 - Details red x 20   Skilled Intervention Instructed in strengthening ex's to assist with tolerance for ambulation.   Ther Proc 2 Bridge #1   Ther Proc 2 - Details Instructed, 10 reps, 5 second hold, progress to 2 sets as tolerated.   Ther Proc 3 Hip ER   Ther Proc 3 - Details Instructed clam shell, AG, feet together, 10 reps, progress to 2 sets as tolerated.   Therapeutic Procedures Ther Proc 2;Ther Proc 3   Therapeutic Activity   Ther Act 1 discussed options for HEP, walking, stairs, adding activities during the day   Ther Act 1 - Details pt was happy to add UE/core/balance activities today   Neuromuscular Re-education   Neuromuscular re-ed of mvmt, balance, coord, kinesthetic sense, posture, proprioception minutes (24109) 10   PTRx Neuro Re-ed 1 Single Leg Stance - Balance Left Foot   PTRx Neuro Re-ed 1 - Details Changed to tandem stance, R/L foot behind, goal 20-30 seconds each, 3-4x/day   PTRx Neuro Re-ed 2 Pallof Press  (not today)   PTRx Neuro Re-ed 2 - Details red theraband x 10 each side, focus on core stability and balance   Total Session Time   Timed Code Treatment Minutes 40   Total Treatment Time (sum of timed and untimed services) 40         Referring Provider:  No ref. provider found

## 2023-12-27 ENCOUNTER — APPOINTMENT (OUTPATIENT)
Dept: URBAN - METROPOLITAN AREA CLINIC 253 | Age: 83
Setting detail: DERMATOLOGY
End: 2023-12-27

## 2023-12-27 VITALS — HEIGHT: 60 IN | RESPIRATION RATE: 14 BRPM | WEIGHT: 132 LBS

## 2023-12-27 DIAGNOSIS — D18.0 HEMANGIOMA: ICD-10-CM

## 2023-12-27 DIAGNOSIS — L82.1 OTHER SEBORRHEIC KERATOSIS: ICD-10-CM

## 2023-12-27 DIAGNOSIS — Z71.89 OTHER SPECIFIED COUNSELING: ICD-10-CM

## 2023-12-27 DIAGNOSIS — L81.4 OTHER MELANIN HYPERPIGMENTATION: ICD-10-CM

## 2023-12-27 DIAGNOSIS — D22 MELANOCYTIC NEVI: ICD-10-CM

## 2023-12-27 PROBLEM — D23.0 OTHER BENIGN NEOPLASM OF SKIN OF LIP: Status: ACTIVE | Noted: 2023-12-27

## 2023-12-27 PROBLEM — D18.01 HEMANGIOMA OF SKIN AND SUBCUTANEOUS TISSUE: Status: ACTIVE | Noted: 2023-12-27

## 2023-12-27 PROBLEM — D22.5 MELANOCYTIC NEVI OF TRUNK: Status: ACTIVE | Noted: 2023-12-27

## 2023-12-27 PROCEDURE — OTHER ADDITIONAL NOTES: OTHER

## 2023-12-27 PROCEDURE — OTHER COUNSELING: OTHER

## 2023-12-27 PROCEDURE — 99213 OFFICE O/P EST LOW 20 MIN: CPT

## 2023-12-27 PROCEDURE — OTHER MIPS QUALITY: OTHER

## 2023-12-27 ASSESSMENT — LOCATION DETAILED DESCRIPTION DERM
LOCATION DETAILED: INFERIOR THORACIC SPINE
LOCATION DETAILED: SUPERIOR LUMBAR SPINE

## 2023-12-27 ASSESSMENT — LOCATION SIMPLE DESCRIPTION DERM
LOCATION SIMPLE: UPPER BACK
LOCATION SIMPLE: LOWER BACK

## 2023-12-27 ASSESSMENT — LOCATION ZONE DERM: LOCATION ZONE: TRUNK

## 2023-12-27 NOTE — PROCEDURE: ADDITIONAL NOTES
Additional Notes: Extracted with CTAs and pressure.
Detail Level: Zone
Render Risk Assessment In Note?: no
Additional Notes: Informed patient that these lesions can be cauterized if they get caught and bleed.

## 2023-12-27 NOTE — PROCEDURE: MIPS QUALITY
Quality 47: Advance Care Plan: Advance Care Planning discussed and documented; advance care plan or surrogate decision maker documented in the medical record.
Quality 431: Preventive Care And Screening: Unhealthy Alcohol Use - Screening: Patient not identified as an unhealthy alcohol user when screened for unhealthy alcohol use using a systematic screening method
Detail Level: Detailed
Quality 130: Documentation Of Current Medications In The Medical Record: Current Medications Documented
Quality 111:Pneumonia Vaccination Status For Older Adults: Patient received any pneumococcal conjugate or polysaccharide vaccine on or after their 60th birthday and before the end of the measurement period
Quality 226: Preventive Care And Screening: Tobacco Use: Screening And Cessation Intervention: Patient screened for tobacco use and is an ex/non-smoker
Quality 110: Preventive Care And Screening: Influenza Immunization: Influenza Immunization previously received during influenza season

## 2023-12-27 NOTE — HPI: FULL BODY SKIN EXAMINATION
What Type Of Note Output Would You Prefer (Optional)?: Standard Output
What Is The Reason For Today's Visit?: Full Body Skin Examination
What Is The Reason For Today's Visit? (Being Monitored For X): concerning skin lesions on an annual basis
Additional History: The patient does not have any spots of concern for todays FBSE.

## 2024-01-08 NOTE — COMMUNITY RESOURCES LIST (ENGLISH)
01/08/2024   [facility]  N/A  For questions about this resource list or additional care needs, please contact your primary care clinic or care manager.  Phone: [phone]   Email: N/A   Address: [address] [place] [address]   Hours: N/A        Food and Nutrition       Food pantry  1  [facility] - Fare for All Distance: 0.14 miles      Pickup   [address] [place] [address]  Language: English, Bangladeshi  Hours: Fri 10:00 AM - 1:00 PM  Fees: Self Pay   Phone: [phone]) [phone] Email: [email or web address] Website: https://www.[facility]/uxpdgiqyp-zyomgqzvx-fyvxqx     2  [facility] Distance: 0.8 miles      In-Person, Pickup   [address] [place] [address]  Language: English, Bangladeshi  Hours: Mon - Wed 9:00 AM - 4:30 PM Appt. Only, Thu 9:00 AM - 6:30 PM Appt. Only, Fri 9:00 AM - 4:30 PM Appt. Only  Fees: Free   Phone: (885) 266-5263 Ext.100 Email: info@[employer].org Website: https://[employer].org     SNAP application assistance  3  [facility] Distance: 0.8 miles      In-Person, Phone/Virtual   [address] [place] [address]  Language: English, Bangladeshi  Hours: Mon - Fri 9:00 AM - 4:30 PM  Fees: Free   Phone: (769) 988-1792 Ext.113 Email: info@[employer].org Website: https://[employer]     4  [employer] Distance: 8.79 miles      In-Person   [address] [place] [address]  Language: English, Bangladeshi  Hours: Mon - Fri 8:30 AM - 5:00 PM  Fees: Free   Phone: (277) 677-6823 Email: info@clues.org Website: http://www.clues.org/     Soup kitchen or free meals  5  [facility] Distance: 1.65 miles      Pickup   [address] [facility] [place] [address]  Language: English  Hours: Mon - Fri 5:00 PM - 6:00 PM  Fees: Free   Phone: (798) 181-6149 Email: contactus@[employer].org Website: https://www.[employer].org/     6   [facility] Distance: 2.92 miles      [facility]   [address] [place] [address]  Language: English  Hours: Sat 5:00 PM - 7:00 PM , Sun 5:30 PM - 6:30 PM  Fees: Free   Phone: (402) 195-8558 Email: office@[facility] Website: http://[facility]/          Important Numbers & Websites       Emergency Services   911  [facility] Services   311  Poison Control   (648) 902-4109  Suicide Prevention Lifeline   (938) 926-4397 (TALK)  Child Abuse Hotline   (395) 957-6987 (4-A-Child)  Sexual Assault Hotline   (856) 570-2750 (HOPE)  National Runaway Safeline   (168) 102-1101 (RUNAWAY)  All-Options Talkline   (957) 608-1552  Substance Abuse Referral   (266) 269-6929 (HELP)

## 2024-01-15 ENCOUNTER — OFFICE VISIT (OUTPATIENT)
Dept: INTERNAL MEDICINE | Facility: CLINIC | Age: 84
End: 2024-01-15
Attending: INTERNAL MEDICINE
Payer: COMMERCIAL

## 2024-01-15 VITALS
OXYGEN SATURATION: 98 % | HEIGHT: 60 IN | WEIGHT: 136 LBS | DIASTOLIC BLOOD PRESSURE: 84 MMHG | BODY MASS INDEX: 26.7 KG/M2 | SYSTOLIC BLOOD PRESSURE: 136 MMHG | TEMPERATURE: 96.1 F | HEART RATE: 77 BPM | RESPIRATION RATE: 16 BRPM

## 2024-01-15 DIAGNOSIS — M81.0 AGE RELATED OSTEOPOROSIS, UNSPECIFIED PATHOLOGICAL FRACTURE PRESENCE: ICD-10-CM

## 2024-01-15 DIAGNOSIS — I10 ESSENTIAL HYPERTENSION: ICD-10-CM

## 2024-01-15 DIAGNOSIS — E78.2 MIXED HYPERLIPIDEMIA: ICD-10-CM

## 2024-01-15 DIAGNOSIS — R73.03 PREDIABETES: Primary | ICD-10-CM

## 2024-01-15 DIAGNOSIS — C55 MALIGNANT NEOPLASM OF UTERUS, UNSPECIFIED SITE (H): ICD-10-CM

## 2024-01-15 LAB
ALBUMIN SERPL BCG-MCNC: 4.3 G/DL (ref 3.5–5.2)
ALP SERPL-CCNC: 66 U/L (ref 40–150)
ALT SERPL W P-5'-P-CCNC: 18 U/L (ref 0–50)
AST SERPL W P-5'-P-CCNC: 25 U/L (ref 0–45)
BILIRUB DIRECT SERPL-MCNC: <0.2 MG/DL (ref 0–0.3)
BILIRUB SERPL-MCNC: 0.6 MG/DL
CHOLEST SERPL-MCNC: 174 MG/DL
FASTING STATUS PATIENT QL REPORTED: YES
HBA1C MFR BLD: 5.2 % (ref 0–5.6)
HDLC SERPL-MCNC: 84 MG/DL
LDLC SERPL CALC-MCNC: 74 MG/DL
NONHDLC SERPL-MCNC: 90 MG/DL
PROT SERPL-MCNC: 7.1 G/DL (ref 6.4–8.3)
TRIGL SERPL-MCNC: 80 MG/DL

## 2024-01-15 PROCEDURE — 99214 OFFICE O/P EST MOD 30 MIN: CPT | Performed by: INTERNAL MEDICINE

## 2024-01-15 PROCEDURE — 80061 LIPID PANEL: CPT | Performed by: INTERNAL MEDICINE

## 2024-01-15 PROCEDURE — 80076 HEPATIC FUNCTION PANEL: CPT | Performed by: INTERNAL MEDICINE

## 2024-01-15 PROCEDURE — 36415 COLL VENOUS BLD VENIPUNCTURE: CPT | Performed by: INTERNAL MEDICINE

## 2024-01-15 PROCEDURE — 83036 HEMOGLOBIN GLYCOSYLATED A1C: CPT | Performed by: INTERNAL MEDICINE

## 2024-01-15 RX ORDER — LISINOPRIL 5 MG/1
5 TABLET ORAL DAILY
Qty: 90 TABLET | Refills: 1 | Status: SHIPPED | OUTPATIENT
Start: 2024-01-15 | End: 2024-08-08

## 2024-01-15 NOTE — PROGRESS NOTES
Assessment & Plan       (I10) Essential hypertension  Comment: Blood pressure stable  Plan: lisinopril (ZESTRIL) 5 MG tablet refilled as directed.explained clearly about the medication,insructions and side effects.            (E78.2) Mixed hyperlipidemia  Plan: On simvastatin 10 mg daily and tolerating well, check lipid panel reflex to direct LDL Fasting,         Hepatic function panel            (R73.03) Prediabetes    Plan: Hemoglobin A1c            (C55) Malignant neoplasm of uterus, unspecified site (H)    (M81.0) Age related osteoporosis, unspecified pathological fracture presence  Plan: On Reclast through endocrinologist      Review of the result(s) of each unique test - lipid panel,  Prescription drug management       BMI:   Estimated body mass index is 26.56 kg/m  as calculated from the following:    Height as of this encounter: 1.524 m (5').    Weight as of this encounter: 61.7 kg (136 lb).         Monica Sultana MD  [facility]EN[facility] Kingston is a 83 year old, presenting for the following health issues:  Lipids and Hypertension      1/15/2024     8:08 AM   Additional Questions   Roomed by kelly   Accompanied by self         1/15/2024     8:08 AM   Patient Reported Additional Medications   Patient reports taking the following new medications none       History of Present Illness       Reason for visit:  Follow up for  said to re osterporosis  etc  lab work    She eats 2-3 servings of fruits and vegetables daily.She consumes 0 sweetened beverage(s) daily.She exercises with enough effort to increase her heart rate 60 or more minutes per day.  She exercises with enough effort to increase her heart rate 3 or less days per week.   She is taking medications regularly.       Hyperlipidemia Follow-Up    Are you regularly taking any medication or supplement to lower your cholesterol?   Yes- simvastatin  Are you having muscle aches or other side effects that you think  could be caused by your cholesterol lowering medication?  No    Hypertension Follow-up    Do you check your blood pressure regularly outside of the clinic? No   Are you following a low salt diet? Yes  Are your blood pressures ever more than 140 on the top number (systolic) OR more   than 90 on the bottom number (diastolic), for example 140/90? Yes    Prediabetes: Last A1c normal     Osteoporosis: On Reclast inj through endocrinologist      History of thyroid nodules: Follows up with endocrinologist     History of uterine cancer s/p hysterectomy -  on lymphedema treatment      Past Medical History:   Diagnosis Date    Benign neoplasm of skin, site unspecified     sees derm.    Calculus of kidney     3 passed spont.    Chronic subinvolution of uterus     Disorder of bone and cartilage, unspecified     osteopenia    Dyspepsia and other specified disorders of function of stomach     dyspepsia    Generalized osteoarthrosis, unspecified site     H/O thyroid nodule     f/u with endocrinologist    Hematuria     microscopic    HTN (hypertension)     Malignant neoplasm of corpus uteri, except isthmus (H)     surgery and radiation rx.stage IC, grade 3 endometrial carcinoma.    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     Nontoxic multinodular goiter     sees endocrine    Other and unspecified hyperlipidemia        Current Outpatient Medications   Medication Sig Dispense Refill    Ascorbic Acid (VITAMIN C CR) 1000 MG TBCR       Cholecalciferol (VITAMIN D3 PO) Take 2,000 Units by mouth daily      fluticasone (FLONASE) 50 MCG/ACT nasal spray INSTILL 2 SPRAYS INTO BOTH NOSTRILS DAILY 16 g 3    lisinopril (ZESTRIL) 5 MG tablet Take 1 tablet (5 mg) by mouth daily 90 tablet 1    Multiple Vitamins-Minerals (MULTIVITAMIN & MINERAL PO) Take 1 tablet by mouth daily      omega-3 fatty acids 1200 MG capsule Take 1 capsule by mouth daily 90 capsule      saccharomyces boulardii (FLORASTOR) 250 MG capsule Take 250 mg by mouth 2 times daily      simvastatin (ZOCOR) 10 MG tablet Take 1 tablet (10 mg) by mouth At Bedtime 90 tablet 3       Review of Systems   CONSTITUTIONAL: NEGATIVE for fever, chills,    RESP: NEGATIVE for significant cough or SOB  CV: NEGATIVE for chest pain, palpitations or peripheral edema  MUSCULOSKELETAL: Lymphedema lower extremity  PSYCHIATRIC: NEGATIVE for changes in mood or affect      Objective    /84   Pulse 77   Temp (!) 96.1  F (35.6  C) (Tympanic)   Resp 16   Ht 1.524 m (5')   Wt 61.7 kg (136 lb)   SpO2 98%   BMI 26.56 kg/m    Body mass index is 26.56 kg/m .  Physical Exam   GENERAL: healthy, alert and no distress  EYES: Eyes grossly normal to inspection, PERRL and conjunctivae and sclerae normal  RESP: lungs clear to auscultation - no rales, rhonchi or wheezes  CV: regular rate and rhythm, normal S1 S2,    MS: Lymphedema wraps noted, no calf tenderness bilaterally  PSYCH: mentation appears normal, affect normal/bright

## 2024-01-15 NOTE — NURSING NOTE
/84   Pulse 77   Temp (!) 96.1  F (35.6  C) (Tympanic)   Resp 16   Ht 1.524 m (5')   Wt 61.7 kg (136 lb)   SpO2 98%   BMI 26.56 kg/m

## 2024-03-18 ENCOUNTER — TELEPHONE (OUTPATIENT)
Dept: INTERNAL MEDICINE | Facility: CLINIC | Age: 84
End: 2024-03-18
Payer: COMMERCIAL

## 2024-03-18 NOTE — TELEPHONE ENCOUNTER
Patient has been seeing Dr. Robertson at [facility] for her hip and back pain. She has been receiving cortisone injections and PT treatment. She is still experiencing pain from her waist to the tops of her legs and would like to know if there is a scan PCP can recommend she have to help figure out what is causing her ongoing pain.

## 2024-03-18 NOTE — TELEPHONE ENCOUNTER
Please advise patient to follow-up with her orthopedics and check with her orthopedics as they are the specialists in back  and hip pain

## 2024-03-27 DIAGNOSIS — K58.0 IRRITABLE BOWEL SYNDROME WITH DIARRHEA: ICD-10-CM

## 2024-03-27 RX ORDER — DIPHENOXYLATE HCL/ATROPINE 2.5-.025MG
1 TABLET ORAL 2 TIMES DAILY PRN
Qty: 40 TABLET | Refills: 0 | OUTPATIENT
Start: 2024-03-27

## 2024-03-27 NOTE — TELEPHONE ENCOUNTER
Spoke with patient and she stated she did not realize this medication was discontinued and states she will just keep using Immodium. Disregard the request for refill

## 2024-04-03 ENCOUNTER — THERAPY VISIT (OUTPATIENT)
Dept: OCCUPATIONAL THERAPY | Facility: CLINIC | Age: 84
End: 2024-04-03
Payer: COMMERCIAL

## 2024-04-03 DIAGNOSIS — I89.0 LYMPHEDEMA: Primary | ICD-10-CM

## 2024-04-03 PROCEDURE — 97140 MANUAL THERAPY 1/> REGIONS: CPT | Mod: GO | Performed by: OCCUPATIONAL THERAPIST

## 2024-04-11 DIAGNOSIS — J30.2 SEASONAL ALLERGIC RHINITIS, UNSPECIFIED TRIGGER: ICD-10-CM

## 2024-04-11 RX ORDER — FLUTICASONE PROPIONATE 50 MCG
SPRAY, SUSPENSION (ML) NASAL
Qty: 16 G | Refills: 3 | Status: SHIPPED | OUTPATIENT
Start: 2024-04-11

## 2024-04-12 ENCOUNTER — OFFICE VISIT (OUTPATIENT)
Dept: PEDIATRICS | Facility: CLINIC | Age: 84
End: 2024-04-12
Payer: COMMERCIAL

## 2024-04-12 ENCOUNTER — NURSE TRIAGE (OUTPATIENT)
Dept: INTERNAL MEDICINE | Facility: CLINIC | Age: 84
End: 2024-04-12

## 2024-04-12 ENCOUNTER — HOSPITAL ENCOUNTER (OUTPATIENT)
Dept: ULTRASOUND IMAGING | Facility: CLINIC | Age: 84
Discharge: HOME | End: 2024-04-12
Attending: NURSE PRACTITIONER | Admitting: NURSE PRACTITIONER
Payer: COMMERCIAL

## 2024-04-12 VITALS
RESPIRATION RATE: 18 BRPM | WEIGHT: 138 LBS | HEART RATE: 71 BPM | DIASTOLIC BLOOD PRESSURE: 80 MMHG | TEMPERATURE: 98.1 F | BODY MASS INDEX: 26.95 KG/M2 | OXYGEN SATURATION: 98 % | SYSTOLIC BLOOD PRESSURE: 161 MMHG

## 2024-04-12 DIAGNOSIS — R60.0 EDEMA OF RIGHT LOWER LEG: Primary | ICD-10-CM

## 2024-04-12 DIAGNOSIS — R60.0 EDEMA OF RIGHT LOWER LEG: ICD-10-CM

## 2024-04-12 PROCEDURE — 99215 OFFICE O/P EST HI 40 MIN: CPT | Performed by: NURSE PRACTITIONER

## 2024-04-12 PROCEDURE — 93971 EXTREMITY STUDY: CPT | Mod: RT

## 2024-04-12 SDOH — HEALTH STABILITY: PHYSICAL HEALTH: ON AVERAGE, HOW MANY MINUTES DO YOU ENGAGE IN EXERCISE AT THIS LEVEL?: 30 MIN

## 2024-04-12 SDOH — HEALTH STABILITY: PHYSICAL HEALTH: ON AVERAGE, HOW MANY DAYS PER WEEK DO YOU ENGAGE IN MODERATE TO STRENUOUS EXERCISE (LIKE A BRISK WALK)?: 3 DAYS

## 2024-04-12 ASSESSMENT — LIFESTYLE VARIABLES
HOW OFTEN DO YOU HAVE SIX OR MORE DRINKS ON ONE OCCASION: NEVER
HOW MANY STANDARD DRINKS CONTAINING ALCOHOL DO YOU HAVE ON A TYPICAL DAY: PATIENT DOES NOT DRINK
AUDIT-C TOTAL SCORE: 0
HOW OFTEN DO YOU HAVE A DRINK CONTAINING ALCOHOL: NEVER
SKIP TO QUESTIONS 9-10: 1

## 2024-04-12 ASSESSMENT — SOCIAL DETERMINANTS OF HEALTH (SDOH)
HOW OFTEN DO YOU ATTEND CHURCH OR RELIGIOUS SERVICES?: NEVER
HOW OFTEN DO YOU GET TOGETHER WITH FRIENDS OR RELATIVES?: MORE THAN THREE TIMES A WEEK
HOW OFTEN DO YOU ATTENT MEETINGS OF THE CLUB OR ORGANIZATION YOU BELONG TO?: NEVER
DO YOU BELONG TO ANY CLUBS OR ORGANIZATIONS SUCH AS CHURCH GROUPS UNIONS, FRATERNAL OR ATHLETIC GROUPS, OR SCHOOL GROUPS?: NO
IN A TYPICAL WEEK, HOW MANY TIMES DO YOU TALK ON THE PHONE WITH FAMILY, FRIENDS, OR NEIGHBORS?: MORE THAN THREE TIMES A WEEK

## 2024-04-12 NOTE — TELEPHONE ENCOUNTER
See if she can be seen by [facility] today. Otherwise if she refuses to go to any appt today, I am not sure what else we can offer her. She needs to be seen in person.

## 2024-04-12 NOTE — TELEPHONE ENCOUNTER
"SECOND LEVEL TRIAGE-YES    S-(situation): right leg swelling    B-(background): history of lymphedema.  Has a pneumatic compression device she uses.      A-(assessment): noting new severe swelling in anterior aspect of right thigh from knee to groin.  Also has lymphedema in lower aspect of leg.  Denied redness, increased warmth, chest pain, difficulty breathing, fever or any other symptoms.  Leg is painful, especially with walking and stated that every other day her pain spikes up to a (10/10).  Is having difficulty walking and getting upstairs.  Patient not able to go to any appointments today and is refusing to go anywhere this weekend.      R-(recommendations): per protocol, should go to ER/UCC or to office with primary care provider approval.    Primary care provider not here today.  Routed to team to review.      Reason for Disposition   SEVERE swelling (e.g., swelling extends above knee, entire leg is swollen, weeping fluid)    Additional Information   Negative: Sounds like a life-threatening emergency to the triager   Negative: Chest pain   Negative: Followed an insect bite and has localized swelling (e.g., small area of puffy or swollen skin)   Negative: Followed a knee injury   Negative: Ankle or foot injury   Negative: Pregnant with leg swelling or edema   Negative: Difficulty breathing at rest   Negative: Entire foot is cool or blue in comparison to other side    Answer Assessment - Initial Assessment Questions  1. ONSET: \"When did the swelling start?\" (e.g., minutes, hours, days)      A year ago-lymphedema  2. LOCATION: \"What part of the leg is swollen?\"  \"Are both legs swollen or just one leg?\"      Right leg-from knee to groin-anterior aspect of leg.  3. SEVERITY: \"How bad is the swelling?\" (e.g., localized; mild, moderate, severe)    - Localized: Small area of swelling localized to one leg.    - MILD pedal edema: Swelling limited to foot and ankle, pitting edema < 1/4 inch (6 mm) deep, rest and " "elevation eliminate most or all swelling.    - MODERATE edema: Swelling of lower leg to knee, pitting edema > 1/4 inch (6 mm) deep, rest and elevation only partially reduce swelling.    - SEVERE edema: Swelling extends above knee, facial or hand swelling present.       severe  4. REDNESS: \"Does the swelling look red or infected?\"      no  5. PAIN: \"Is the swelling painful to touch?\" If Yes, ask: \"How painful is it?\"   (Scale 1-10; mild, moderate or severe)      Yes.  Intermittently 10/10.  Walks frequently, but is painful   6. FEVER: \"Do you have a fever?\" If Yes, ask: \"What is it, how was it measured, and when did it start?\"       no  7. CAUSE: \"What do you think is causing the leg swelling?\"      Has lymphedema  8. MEDICAL HISTORY: \"Do you have a history of blood clots (e.g., DVT), cancer, heart failure, kidney disease, or liver failure?\"      no  9. RECURRENT SYMPTOM: \"Have you had leg swelling before?\" If Yes, ask: \"When was the last time?\" \"What happened that time?\"      Yes, history of lymphedema  10. OTHER SYMPTOMS: \"Do you have any other symptoms?\" (e.g., chest pain, difficulty breathing)        no  11. PREGNANCY: \"Is there any chance you are pregnant?\" \"When was your last menstrual period?\"        no    Protocols used: Leg Swelling and Edema-A-OH    "

## 2024-04-12 NOTE — TELEPHONE ENCOUNTER
Call to pt. She is unable to come to [facility] until the afternoon.   Call to [facility]. They will see her no later than 2:30.   Pt agrees with this.
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
## 2024-04-12 NOTE — COMMUNITY RESOURCES LIST (ENGLISH)
April 12, 2024           YOUR PERSONALIZED LIST OF SERVICES & PROGRAMS           NAVIGATION    Eligibility Screening      [facility] [place] Family Investment Program (MFIP)  [address] [place] [address] [address] [place] [address] (Distance: 8.6 miles)  Language: English  Fee: Free      [facility] - Health insurance application assistance - [facility]  [address] [place] [address] [address] [place] [address] (Distance: 8.6 miles)  Language: English  Fee: Free      [employer]  Phone: (566) 534-4919  Website: https://www.mnsure.org/about-us/assister-program/navigators/index.jsp  Language: English  Hours: Mon 8:00 AM - 4:00 PM Tue 8:00 AM - 4:00 PM Wed 8:00 AM - 4:00 PM Thu 8:00 AM - 4:00 PM        ASSISTANCE    Nutrition Benefits      [facility] SNAP application assistance [facility]  [address] [place] [address] (Distance: 9.1 miles)  Phone: (837) [phone]  Language: English  Fee: Free  Accessibility: Translation services, Ada accessible      [facility] - WIC application assistance [facility]  [address] [place] [address] [address] [place] [address] (Distance: 8.6 miles)  Language: English  Fee: Free      [employer] [place] - SNAP (formerly food stamps) Screening and Application help  Phone: (757) 696-1278  Website: https://www.hungersolutions.org/programs/[place]-food-helpline/  Language: English  Hours: Mon 10:00 AM - 5:00 PM Tue 10:00 AM - 5:00 PM Wed 10:00 AM - 5:00 PM Thu 10:00 AM - 5:00 PM Fri 10:00 AM - 5:00 PM  Fee: Free  Accessibility: Ada accessible, Blind accommodation, Deaf or hard of hearing, Translation services    Pantry      [employer] [facility] - [employer]f  [address] [place] [address] (Distance: 12.9 miles)  Phone: (712) 776-1197  Language: Romanian, English, Romanian  Fee: Free      [facility] Food pantry  [address] [place] [address] [address] [place] [address] (Distance: 8.6 miles)  Phone: [phone])  [phone]  Language: English, Libyan, Eritrean, Kuwaiti  Fee: Free      [facility] [employer]  Phone: (777) 221-8485  Website: https://www.[employer]  Language: English, Libyan  Hours: Mon 8:00 AM - 5:00 PM Tue 8:00 AM - 5:00 PM Wed 8:00 AM - 5:00 PM Thu 8:00 AM - 5:00 PM Fri 8:00 AM - 5:00 PM  Fee: Free  Accessibility: Ada accessible               IMPORTANT NUMBERS & WEBSITES        Emergency Services  911  .   [facility]  211 http://211unitedway.org  .   Poison Control  (378) 467-9823 http://[place]poison.org http://wisconsinpoison.org  .     Suicide and Crisis Lifeline  988 http://988trustedsafeline.org  .   Childhelp [place] Child Abuse Hotline  675.931.7650 http://Childhelphotline.org   .   [place] Sexual Assault Hotline  (733) 205-6347 (HOPE) http://Kvantum.iFormulary   .     [place] Runaway Safeline  (363) 886-2324 (RUNAWAY) http://Consulting Services.iFormulary  .   Pregnancy & Postpartum Support  Call/text 840-678-2249  [place]: http://ppsupportmn.org  [place]: http://JuicyCanvas.com/wi  .   Substance Abuse National Helpline ([facility])  311-871-HELP (0640) http://Findtreatment.gov   .                DISCLAIMER: These resources have been generated via the [employer] Platform. [employer]  does not endorse any service providers mentioned in this resource list. [employer] does not guarantee that the services mentioned in this resource list will be available to you or will improve your health or wellness.    [facility]

## 2024-04-12 NOTE — PATIENT INSTRUCTIONS
Results for orders placed or performed during the hospital encounter of 04/12/24   US Lower Extremity Venous Duplex Right     Status: None    Narrative    VENOUS ULTRASOUND RIGHT LOWER EXTREMITY  4/12/2024 4:21 PM     HISTORY: Edema of right lower leg    COMPARISON: None.    Technique:  Color Doppler and spectral waveform analysis performed  throughout the deep veins of the right lower extremity.    FINDINGS: The right common femoral, proximal greater saphenous,  femoral, and popliteal veins demonstrate normal blood flow,  compression, and augmentation. Posterior tibial and peroneal veins are  compressible. Contralateral left common femoral vein is patent.      Impression    IMPRESSION: Negative for deep venous thrombosis in the right lower  extremity.    JOSUE BERNARD MD         SYSTEM ID:  [id number]       The US is negative for DVT or blood clot.    Try to limit sodium in your diet and elevate you legs when you are seated.    Keep using the compression stockings and follow up with your lymphedema specialists as soon as you can.

## 2024-05-09 ENCOUNTER — THERAPY VISIT (OUTPATIENT)
Dept: OCCUPATIONAL THERAPY | Facility: CLINIC | Age: 84
End: 2024-05-09
Payer: COMMERCIAL

## 2024-05-09 DIAGNOSIS — I89.0 LYMPHEDEMA: Primary | ICD-10-CM

## 2024-05-09 PROCEDURE — 97140 MANUAL THERAPY 1/> REGIONS: CPT | Mod: GO | Performed by: OCCUPATIONAL THERAPIST

## 2024-05-09 NOTE — PROGRESS NOTES
05/09/24 0500   Appointment Info   Treating Provider LEE Martin, OTR/L, CLT-   Total/Authorized Visits 50 hard max for a year, [employer] fed employee program   Visits Used 13   Medical Diagnosis I89.0 (ICD-10-CM) - Lymphedema   OT Tx Diagnosis Lymphedema R LE   Progress Note/Certification   Onset of Illness/Injury or Date of Surgery 05/26/23   Therapy Frequency 1x/month   Predicted Duration 2 months   OT Goal 1   Goal Identifier Home Program and Precautions   Goal Description Pt will report understanding s/s lymphedema and methods of prevention and demonstrate independence in performing prescribed exercises to facilitate the lymph system and muscle pumping systems for max reductions needed to help improve mobility engagement and increase I with LB cares   Rationale In order to maximize safety and independence with performance of self-care activities   Target Date 08/28/23   OT Goal 2   Goal Identifier GCB   Goal Description Tolerate gradient compression bandaging/wearing compression garments 23 hrs./day to prevent re-accumulation of extracellular fluid  for max reductions needed to help improve mobility engagement and increase I with LB cares and be able to independently don or instruct caregiver with quick wrap version of GCB   Rationale In order to maximize safety and independence with performance of self-care activities   Target Date 08/28/23   Goal Progress GOAL MET   OT Goal 3   Goal Identifier Volume   Goal Description Circumferential/volumetric measurements will be reduced in the ____ to within ___% larger than the ____ to enable patient to (michael her garments, walk with normal gait,  .)   Rationale In order to maximize safety and independence with performance of self-care activities   Target Date 08/28/23   Goal Progress GOAL MET   Subjective Report   Subjective Report I feel like I am doing alot better   Objective Measures   Objective Measures Objective Measure 1   Objective Measure 1   Objective Measure  volume   Details decrease by about 50ml   Treatment Interventions (OT)   Interventions Self Care/Home Management;Therapeutic Procedure/Exercise;Manual Therapy   Manual Therapy   Manual Therapy Minutes (97823) 15   Manual Therapy 1 - Details pt arrives at the wrong time accidentally, however is happy to get done what we can . Pt measured 50 ml in reduction, she is now wearing a foot /ankle garment during th day and night that is helping a lot, she has been ddoing some self GCB as needed, and has had struggles with wrapping her thigh because it falls down. Reviewed her quick wrap GCB and she wasn't adding foam to thigh so gave her a foam roll to get compression bandaging to hold better.   Pt is happy to discontinue with therapy and might check in in another 6 month to a year if she is having trouble or has worsening system. Discontinue today   Skilled Intervention measure, home program ed   Patient Response/Progress +progress to all goals   Education   Education Comments pt has a pump now and is using it,   Plan   Home program wear GCB 23 out of 24 hrs daily, self-MLD, HEP   Updates to plan of care groin clearing technique each time she goes to the bathroom daily   Plan for next session dc today   Comments   Comments pt already owns 2 sets of jobst thigh high, as well as readywrap velcro wraps that are slightly too small for her today but hopefully she can wear after CDT., pt will need further training on use of EZ dano and home program   Total Session Time   Timed Code Treatment Minutes 15   Total Treatment Time (sum of timed and untimed services) 15         DISCHARGE  Reason for Discharge: Patient has met all goals.    Equipment Issued: large foam roll to add to GCB for home GCB at night.     Discharge Plan: Patient to continue home program.    Referring Provider:  Monica Sultana

## 2024-06-03 ENCOUNTER — TELEPHONE (OUTPATIENT)
Dept: VASCULAR SURGERY | Facility: CLINIC | Age: 84
End: 2024-06-03
Payer: COMMERCIAL

## 2024-06-03 NOTE — TELEPHONE ENCOUNTER
Patient wanted to reschedule an appt and decline 6/5 and 7/23 in [place] and all appts in [place]. Patient states that is too long to wait per her Dr. That is recommending her to see CPN. Patient requested to speak with a nurse.      SPARKLE ENCINAS,   [facility]

## 2024-06-03 NOTE — TELEPHONE ENCOUNTER
"Called and SWP in regards to scheduling an appt with Ron. Explained the next availability in [place], noting that he has limited availability and that Ron is getting ready to retire. She made it very clear that she did not want to be seen in . Patient stated \"I will just see a new provider.\" And hung up.   "

## 2024-06-18 ENCOUNTER — ALLIED HEALTH/NURSE VISIT (OUTPATIENT)
Dept: INTERNAL MEDICINE | Facility: CLINIC | Age: 84
End: 2024-06-18
Payer: COMMERCIAL

## 2024-06-18 VITALS — HEART RATE: 90 BPM | SYSTOLIC BLOOD PRESSURE: 479 MMHG | DIASTOLIC BLOOD PRESSURE: 79 MMHG

## 2024-06-18 DIAGNOSIS — Z01.30 BP CHECK: Primary | ICD-10-CM

## 2024-06-18 PROCEDURE — 99207 PR NO CHARGE NURSE ONLY: CPT | Performed by: INTERNAL MEDICINE

## 2024-06-18 NOTE — PROGRESS NOTES
Sophia Olivarez was evaluated at [facility] on June 18, 2024 at which time her blood pressure was:    BP Readings from Last 1 Encounters:   06/18/24 (!) 479/79     No data recorded      Reviewed lifestyle modifications for blood pressure control and reduction: including making healthy food choices, managing weight, getting regular exercise, smoking cessation, reducing alcohol consumption, monitoring blood pressure regularly.     Symptoms: Other: self testing at home had higher than normal results    BP Goal:< 140/90 mmHg    BP Assessment:  BP too high    Potential Reasons for BP too high: Anxiety - patient had injections around spine today and was nervous    BP Follow-Up Plan: Recheck BP in 30 days in the pharmacy.Date of next scheduled BP visit or call: [phone]    Recommendation to Provider: Continue therapy, and patient will continue to monitor BP    Note completed by: Isaias Leon RPH

## 2024-06-19 ENCOUNTER — TELEPHONE (OUTPATIENT)
Dept: OTHER | Facility: CLINIC | Age: 84
End: 2024-06-19
Payer: COMMERCIAL

## 2024-06-19 ENCOUNTER — TELEPHONE (OUTPATIENT)
Dept: INTERNAL MEDICINE | Facility: CLINIC | Age: 84
End: 2024-06-19
Payer: COMMERCIAL

## 2024-06-19 NOTE — TELEPHONE ENCOUNTER
Received note that her blood pressure was high on 6/18/2024 after patient had injections around spine   .  Called patient advised to schedule appointment in clinic today at 3:30 PM, patient stated that that she has been checking her blood pressure at home and has been normal and does not want to come into the clinic today.

## 2024-06-19 NOTE — TELEPHONE ENCOUNTER
I returned call to Sophia and discussed Dr. Abel is a vascular medicine provider and would see her for a consult to discuss her lymphedema and what treatment options he feels is appropriate for her. She verbalized understanding and would like to schedule. Routing back to scheduling to call patient and schedule appointment with Dr. Abel      Routing to scheduling to coordinate the following:    NEW VASCULAR PATIENT consult with Dr. Abel  Please schedule this at next available    Appt note:  Referral for lymphedema    Ines NICOLE, RN    [facility]  Office: [phone]  Fax: [phone]

## 2024-06-19 NOTE — TELEPHONE ENCOUNTER
[facility]    Who is the name of the provider?:  BRICE ABEL   What is the location you see this provider at/preferred location?: [place]  Person calling / Facility: Sophia Olivarez  Phone number:  [phone] (home)   Nurse call back needed:  Yes     Reason for call:  Patient referred to Dr Abel for lymphydema by Nathalia Martin (OT?)    Patient would like to speak to a nurse before scheduling to learn more about what Dr Abel does. She is wondering if he has a tactile machine?    Pharmacy location:   n/a  Outside Imaging: n/a   Can we leave a detailed message on this number?  YES     6/19/2024, 8:39 AM

## 2024-07-12 ENCOUNTER — OFFICE VISIT (OUTPATIENT)
Dept: OTHER | Facility: CLINIC | Age: 84
End: 2024-07-12
Attending: INTERNAL MEDICINE
Payer: COMMERCIAL

## 2024-07-12 VITALS
HEART RATE: 79 BPM | OXYGEN SATURATION: 97 % | HEIGHT: 60 IN | SYSTOLIC BLOOD PRESSURE: 152 MMHG | BODY MASS INDEX: 26.35 KG/M2 | DIASTOLIC BLOOD PRESSURE: 80 MMHG | WEIGHT: 134.2 LBS

## 2024-07-12 DIAGNOSIS — I89.0 LYMPHEDEMA: Primary | ICD-10-CM

## 2024-07-12 DIAGNOSIS — I83.893 VARICOSE VEINS OF BILATERAL LOWER EXTREMITIES WITH OTHER COMPLICATIONS: ICD-10-CM

## 2024-07-12 DIAGNOSIS — E78.5 HYPERLIPIDEMIA LDL GOAL <70: ICD-10-CM

## 2024-07-12 DIAGNOSIS — C54.1 ENDOMETRIAL CANCER (H): ICD-10-CM

## 2024-07-12 DIAGNOSIS — I10 BENIGN ESSENTIAL HYPERTENSION: ICD-10-CM

## 2024-07-12 PROCEDURE — G2211 COMPLEX E/M VISIT ADD ON: HCPCS | Performed by: INTERNAL MEDICINE

## 2024-07-12 PROCEDURE — 99213 OFFICE O/P EST LOW 20 MIN: CPT | Performed by: INTERNAL MEDICINE

## 2024-07-12 PROCEDURE — 99205 OFFICE O/P NEW HI 60 MIN: CPT | Performed by: INTERNAL MEDICINE

## 2024-07-12 NOTE — PROGRESS NOTES
[facility] INITIAL VASCULAR MEDICINE CONSULT    ( New patient Visit)     PRIMARY HEALTH CARE PROVIDER:  Monica Sultana      REFERRING HEALTH CARE PROVIDER;  Referred Self      REASON FOR CONSULT: Evaluation and management of lymphedema and venous insufficiency      HPI: Sophia Olivarez is a 83 year old very pleasant female with past medical history of endometrial cancer underwent total abdominal hysterectomy and right-sided lymph node dissection then followed by radiation therapy in 2005 subsequently she developed right lower extremity more than left lower extremity lymphedema seen and evaluated by edema therapist and also she developed bilateral lower extremity varicose veins many years followed by Dr. Velasquez.  She has been wearing compression, wraps and also she got the pump therapy approximately using 45 minutes daily.  Her edema therapist recently moved out of state.  Dr. Velasquez retired.  She is here today to establish longitudinal care for management of lymphedema and venous insufficiency.  She has no history of a DVT.  She is non-smoker.  No history of a cellulitis of the leg.  History of hypertension outside well-controlled today elevated initially then repeat blood pressure was normal.  Lipids are well-controlled with low-dose simvastatin.  She underwent venous competency studies a year ago and few months ago right lower extremity venous duplex no DVT    She is new to me reviewed available records in the epic and updated chart      PAST MEDICAL HISTORY  Past Medical History:   Diagnosis Date    Benign neoplasm of skin, site unspecified     sees derm.    Calculus of kidney     3 passed spont.    Chronic subinvolution of uterus     Disorder of bone and cartilage, unspecified     osteopenia    Dyspepsia and other specified disorders of function of stomach     dyspepsia    Generalized osteoarthrosis, unspecified site     H/O thyroid nodule     f/u with  endocrinologist    Hematuria     microscopic    HTN (hypertension)     Malignant neoplasm of corpus uteri, except isthmus (H)     surgery and radiation rx.stage IC, grade 3 endometrial carcinoma.    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     Nontoxic multinodular goiter     sees endocrine    Other and unspecified hyperlipidemia        CURRENT MEDICATIONS  Current Outpatient Medications   Medication Sig Dispense Refill    Ascorbic Acid (VITAMIN C CR) 1000 MG TBCR       Cholecalciferol (VITAMIN D3 PO) Take 2,000 Units by mouth daily      fluticasone (FLONASE) 50 MCG/ACT nasal spray INSTILL 2 SPRAYS INTO BOTH NOSTRILS DAILY 16 g 3    lisinopril (ZESTRIL) 5 MG tablet Take 1 tablet (5 mg) by mouth daily 90 tablet 1    Multiple Vitamins-Minerals (MULTIVITAMIN & MINERAL PO) Take 1 tablet by mouth daily      omega-3 fatty acids 1200 MG capsule Take 1 capsule by mouth daily 90 capsule     saccharomyces boulardii (FLORASTOR) 250 MG capsule Take 250 mg by mouth 2 times daily      simvastatin (ZOCOR) 10 MG tablet Take 1 tablet (10 mg) by mouth At Bedtime 90 tablet 3     No current facility-administered medications for this visit.       PAST SURGICAL HISTORY:  Past Surgical History:   Procedure Laterality Date    ABDOMEN SURGERY  2005    uterine cancer Hysterectiomy    BIOPSY      many times from nodules from thyroid    CHOLECYSTECTOMY  1992    GYN SURGERY  2005    uterine cancer    HEAD & NECK SURGERY      right thyroid taken out    [facility] NONSPECIFIC PROCEDURE  1992    Cholecystectomy. abstracted 6/24/02.    [facility] NONSPECIFIC PROCEDURE      Thyroidectomy. abstracted 6/24/02.    [facility] NONSPECIFIC PROCEDURE      Dilatation & Curettage X 2. abstracted 6/24/02.    [facility] NONSPECIFIC PROCEDURE      Cystoscopy. abstracted 6/24/02.    [facility] NONSPECIFIC PROCEDURE      hysterctomy for uterine cancer.    [facility] COLONOSCOPY THRU STOMA, DIAGNOSTIC  10/2007     due q 5 yrs       ALLERGIES     Allergies   Allergen Reactions    Aspirin     Atorvastatin Calcium      lipitor, myalgia, zocor    Codeine Sulfate      Difficulty breathing    Epidural Tray      Vomiting and diarrhea    Fentanyl Other (See Comments)    Magnesium Citrate Diarrhea     Vomiting      Meperidine Hcl      demerol GI    Questran [Cholestyramine] Nausea    Tilactase        FAMILY HISTORY  Family History   Problem Relation Age of Onset    Family History Negative Mother     Other Cancer Mother     Osteoporosis Mother     Family History Negative Father     Other Cancer Father     Neurologic Disorder Sister         MS    Diabetes Brother         also, colon CA, colon surgery    Diabetes Sister     Hypertension Sister     Hyperlipidemia Sister     Anxiety Disorder Sister     Mental Illness Sister         bi polar    Obesity Sister         from some pills too    Diabetes Brother     Colon Cancer Brother     Other Cancer Brother     Anxiety Disorder Brother     Mental Illness Brother         schitzophenia    Obesity Brother         from pills too       VASCULAR FAMILY HISTORY  1st order relative with atherosclerotic PAD: No  1st order relative with AAA: No  Family history of Familial Hyperlipidemia No  Family History of Hypercoagulable state:No    VASCULAR RISK FACTORS  1. Diabetes:No   2. Smoking: has never smoked.  3. HTN: fair control  4.Hyperlipidemia: Yes - controlled      SOCIAL HISTORY  Social History     Socioeconomic History    Marital status:      Spouse name: Not on file    Number of children: Not on file    Years of education: Not on file    Highest education level: Not on file   Occupational History    Not on file   Tobacco Use    Smoking status: Never    Smokeless tobacco: Never   Vaping Use    Vaping status: Never Used   Substance and Sexual Activity    Alcohol use: No    Drug use: No    Sexual activity: Not Currently     Partners: Male     Birth control/protection: None   Other Topics  Concern    Parent/sibling w/ CABG, MI or angioplasty before 65F 55M? No   Social History Narrative    Not on file     Social Determinants of Health     Financial Resource Strain: Low Risk  (4/12/2024)    Financial Resource Strain     Within the past 12 months, have you or your family members you live with been unable to get utilities (heat, electricity) when it was really needed?: No   Food Insecurity: High Risk (4/12/2024)    Food Insecurity     Within the past 12 months, did you worry that your food would run out before you got money to buy more?: No     Within the past 12 months, did the food you bought just not last and you didn t have money to get more?: Yes   Transportation Needs: Low Risk  (4/12/2024)    Transportation Needs     Within the past 12 months, has lack of transportation kept you from medical appointments, getting your medicines, non-medical meetings or appointments, work, or from getting things that you need?: No   Physical Activity: Insufficiently Active (4/12/2024)    Exercise Vital Sign     Days of Exercise per Week: 3 days     Minutes of Exercise per Session: 30 min   Stress: No Stress Concern Present (4/12/2024)    [employer] [place] [employer] - Occupational Stress Questionnaire     Feeling of Stress : Not at all   Social Connections: Moderately Isolated (4/12/2024)    Social Connection and Isolation Panel [NHANES]     Frequency of Communication with Friends and Family: More than three times a week     Frequency of Social Gatherings with Friends and Family: More than three times a week     Attends Restorationism Services: Never     Active Member of Clubs or Organizations: No     Attends Club or Organization Meetings: Never     Marital Status:    Interpersonal Safety: Low Risk  (1/15/2024)    Interpersonal Safety     Do you feel physically and emotionally safe where you currently live?: Yes     Within the past 12 months, have you been hit, slapped, kicked or otherwise physically  hurt by someone?: No     Within the past 12 months, have you been humiliated or emotionally abused in other ways by your partner or ex-partner?: No   Housing Stability: Low Risk  (4/12/2024)    Housing Stability     Do you have housing? : Yes     Are you worried about losing your housing?: No       ROS:   General: No change in weight, sleep or appetite.  Normal energy.  No fever or chills  Eyes: Negative for vision changes or eye problems  ENT: No problems with ears, nose or throat.  No difficulty swallowing.  Resp: No coughing, wheezing or shortness of breath  CV: No chest pains or palpitations  GI: No nausea, vomiting,  heartburn, abdominal pain, diarrhea, constipation or change in bowel habits  : No urinary frequency or dysuria, bladder or kidney problems  Musculoskeletal: No significant muscle or joint pains  Neurologic: No headaches, numbness, tingling, weakness, problems with balance or coordination  Psychiatric: No problems with anxiety, depression or mental health  Heme/immune/allergy: No history of bleeding or clotting problems or anemia.  No allergies or immune system problems  Endocrine: No history of thyroid disease, diabetes or other endocrine disorders  Skin: No rashes,worrisome lesions or skin problems  Vascular: History of bilateral lower extremity varicose veins no previous intervention  No DVT  Lymphedema developed after treatment for endometrial cancer underwent hysterectomy, lymph node dissection and radiation therapy in 2005.  Using wraps, compression, pump therapy  No claudication symptoms    EXAM:  BP (!) 152/80 (BP Location: Left arm, Patient Position: Chair, Cuff Size: Adult Regular)   Pulse 79   Ht 5' (1.524 m)   Wt 134 lb 3.2 oz (60.9 kg)   SpO2 97%   BMI 26.21 kg/m    In general, the patient is a pleasant female in no apparent distress.    HEENT: NC/AT.  PERRLA.  EOMI.  Sclerae white, not injected.  Nares clear.  Pharynx without erythema or exudate.  Dentition intact.    Neck: No  adenopathy.  No thyromegaly. Carotids +2/2 bilaterally without bruits.  No jugular venous distension.   Heart: RRR. Normal S1, S2 splits physiologically. No murmur, rub, click, or gallop. The PMI is in the 5th ICS in the midclavicular line. There is no heave.    Lungs: CTA.  No ronchi, wheezes, rales.  No dullness to percussion.   Abdomen: Soft, nontender, nondistended. No organomegaly. No AAA.  No bruits.   Extremities: Vascular:   Right lower extremity more than left lower extremity lymphedema  Bilateral lower extremity varicose veins  No foot ulcers or leg ulcers  No cellulitis        Labs:  LIPID RESULTS:  Lab Results   Component Value Date    CHOL 174 01/15/2024    CHOL 158 07/17/2020    HDL 84 01/15/2024    HDL 71 07/17/2020    LDL 74 01/15/2024    LDL 72 07/17/2020    TRIG 80 01/15/2024    TRIG 75 07/17/2020    CHOLHDLRATIO 2.7 05/26/2015       LIVER ENZYME RESULTS:  Lab Results   Component Value Date    AST 25 01/15/2024    AST 19 06/08/2021    ALT 18 01/15/2024    ALT 27 06/08/2021       CBC RESULTS:  Lab Results   Component Value Date    WBC 4.6 07/24/2023    WBC 5.2 07/09/2020    RBC 4.43 07/24/2023    RBC 4.16 07/09/2020    HGB 13.1 07/24/2023    HGB 12.0 07/17/2020    HCT 38.6 07/24/2023    HCT 37.2 07/09/2020    MCV 87 07/24/2023    MCV 89 07/09/2020    MCH 29.6 07/24/2023    MCH 29.1 07/09/2020    MCHC 33.9 07/24/2023    MCHC 32.5 07/09/2020    RDW 12.7 07/24/2023    RDW 12.8 07/09/2020     07/24/2023     07/09/2020       BMP RESULTS:  Lab Results   Component Value Date     07/24/2023     06/08/2021    POTASSIUM 3.7 07/24/2023    POTASSIUM 3.6 07/20/2022    POTASSIUM 3.4 06/08/2021    CHLORIDE 96 (L) 07/24/2023    CHLORIDE 102 07/20/2022    CHLORIDE 104 06/08/2021    CO2 28 07/24/2023    CO2 28 07/20/2022    CO2 32 06/08/2021    ANIONGAP 12 07/24/2023    ANIONGAP 7 07/20/2022    ANIONGAP 3 06/08/2021    GLC 99 07/24/2023    GLC 84 07/20/2022    GLC 78 06/08/2021    BUN 8.7  2023    BUN 16 2022    BUN 7 2021    CR 0.52 2023    CR 0.62 2021    GFRESTIMATED >90 2023    GFRESTIMATED >60 2023    GFRESTIMATED 86 2021    GFRESTBLACK >90 2021    ELISA 9.6 2023    ELISA 9.0 2021        A1C RESULTS:  Lab Results   Component Value Date    A1C 5.2 01/15/2024    A1C 5.7 2020       THYROID RESULTS:  Lab Results   Component Value Date    TSH 0.98 2022    TSH 0.97 2020       Procedures:     VENOUS ULTRASOUND RIGHT LOWER EXTREMITY  2024 4:21 PM      HISTORY: Edema of right lower leg     COMPARISON: None.     Technique:  Color Doppler and spectral waveform analysis performed  throughout the deep veins of the right lower extremity.     FINDINGS: The right common femoral, proximal greater saphenous,  femoral, and popliteal veins demonstrate normal blood flow,  compression, and augmentation. Posterior tibial and peroneal veins are  compressible. Contralateral left common femoral vein is patent.                                                                      IMPRESSION: Negative for deep venous thrombosis in the right lower  extremity.     JOSUE BERNARD MD   Name:  Sophia Olivarez                                                      Patient ID: [id number]  Date: 2023                                                       : 1940  Sex: female                                                                 Examined by: ROSHAN Burton RVT  Age:  82 year old                                                         Reading MD: MARIA LUISA     INDICATION:  Lymphedema.     EXAM TYPE  RIGHT LOWER EXTREMITY VENOUS DUPLEX FOR VENOUS INSUFFICIENCY  TECHNICAL SUMMARY     A duplex ultrasound study using color flow was performed, to evaluate the right lower extremity veins for valvular incompetence with the patient in a steep reversed trendelenberg.      RIGHT:     The deep veins demonstrate phasic flow, compress, and  respond to augmentations.  There is no evidence of DVT.  The common femoral vein is incompetent and free of thrombus. The remaining deep veins are competent and free of thrombus.      The GSV demonstrates phasic flow, compresses, and responds to augmentations from the saphenofemoral junction to the ankle with no evidence of thrombus. The greater saphenous vein measures 6.5 mm at the saphenofemoral junction, 4.6 mm in the proximal thigh, and 3.4 mm at the knee. The GSV is incompetent from the SFJ to the proximal thigh, the distal thigh, and again from the proximal to mid calf with the greatest reflux time of 3794 milliseconds.       The AASV is incompetent ( 5.5 mm) from the saphenofemoal junction to the proximal thigh with a reflux time of 5592 milliseconds. The AASV takes a straight course for 10 cm before coursing superficially out of the fascia and giving rise to a varicose branch measuring 4.1 mm that courses anteriorly with a reflux time of 5196 milliseconds.     The Giacomini vein is competent ( 1.7 mm) communicating with the small saphenous vein at the knee level.      The SSV demonstrates phasic flow, compresses, and responds to augmentations from the popliteal space to the ankle.  No reflux or thrombus is seen.The saphenopopliteal junction is absent.      Perforators: There is no evidence of incompetent  veins at any level.      LEFT:     The CFV demonstrates phasic flow, compresses, and responds to augmentations.  There is no DVT.       FINAL SUMMARY:  Radha Burton on 2023 10:29 AM   Name:  Sophia Olivarez                                                      Patient ID: [id number]  Date: 2023                                                       : 1940  Sex: female                                                                 Examined by: ROSHAN Burton RVT  Age:  82 year old                                                         Reading MD: MARIA LUISA     INDICATION:   Lymphedema.     EXAM TYPE  RIGHT LOWER EXTREMITY VENOUS DUPLEX FOR VENOUS INSUFFICIENCY  TECHNICAL SUMMARY     A duplex ultrasound study using color flow was performed, to evaluate the right lower extremity veins for valvular incompetence with the patient in a steep reversed trendelenberg.      RIGHT:     The deep veins demonstrate phasic flow, compress, and respond to augmentations.  There is no evidence of DVT.  The common femoral vein is incompetent and free of thrombus. The remaining deep veins are competent and free of thrombus.      The GSV demonstrates phasic flow, compresses, and responds to augmentations from the saphenofemoral junction to the ankle with no evidence of thrombus. The greater saphenous vein measures 6.5 mm at the saphenofemoral junction, 4.6 mm in the proximal thigh, and 3.4 mm at the knee. The GSV is incompetent from the SFJ to the proximal thigh, the distal thigh, and again from the proximal to mid calf with the greatest reflux time of 3794 milliseconds.       The AASV is incompetent ( 5.5 mm) from the saphenofemoal junction to the proximal thigh with a reflux time of 5592 milliseconds. The AASV takes a straight course for 10 cm before coursing superficially out of the fascia and giving rise to a varicose branch measuring 4.1 mm that courses anteriorly with a reflux time of 5196 milliseconds.     The Giacomini vein is competent ( 1.7 mm) communicating with the small saphenous vein at the knee level.      The SSV demonstrates phasic flow, compresses, and responds to augmentations from the popliteal space to the ankle.  No reflux or thrombus is seen.The saphenopopliteal junction is absent.      Perforators: There is no evidence of incompetent  veins at any level.      LEFT:     The CFV demonstrates phasic flow, compresses, and responds to augmentations.  There is no DVT.       FINAL SUMMARY:  Right lower extremity:  Deep veins  1.  No deep vein thrombosis  2.  Right common femoral  vein incompetence     Superficial veins  1.  Segmental, right great saphenous vein incompetence  2.  Right anterior accessory saphenous vein incompetence, the source of a varicosity on the thigh     Left lower extremity:  Normal phasicity, compression and augmentation in the left common femoral vein with no evidence of deep vein thrombosis     Incompetence Criteria:  Greater than 500 milliseconds of relfux measured in the the superficial and perforating veins and greater than 1000 milliseconds of reflux measured in the deep veins.       ROBERT Velasquez MD, FACS         Assessment and Plan:     1. Lymphedema RT>left leg ( secondary to endometrial ca , s/p surg , LN dissection Rt side,  XRT 2005)    2. Varicose veins of bilateral lower extremities with other complications  CEAP 4 CVI    3. Hx of Endometrial cancer (H) 2005 ( s/p DIANNE with BSO in 2005 then XRT etc )    4. Benign essential hypertension    5. Hyperlipidemia LDL goal <70    This is a very pleasant 83-year-old female new patient visit for evaluation and management of secondary lymphedema developed after endometrial cancer treatment underwent hysterectomy lymph node dissection and radiation therapy in 2005 and also bilateral lower extremity varicose veins but no intervention.  No history of a DVT.  She was seen and evaluated by edema therapist currently using compression, wraps and also using 45 minutes after pump therapy.  She was managed for many years by Dr. Velasquez who recently retired.  She denies any chest pain, shortness of breath or palpitations.  No history of cellulitis.  Blood pressure well-controlled lipids well-controlled.    Reviewed previous evaluation in the epic  Reviewed venous competency studies and venous duplex ultrasound results    Suggested patient to continue compression wraps and elevate the leg when able  Follow lymphedema therapist recommendations  Increase the pump usage to minimum 1 hour a day  Consider taking lymphatic  formula 1000 for the first month 2 pills a day and then followed by 1 pill a day information given  Her lymphedema therapist moved out of the state will make referral to see a new one when needed  Continue rest of the medications same  For all other issues follow-up with primary care physician    Follow-up with me in 1 year or sooner if any problems       60 minutes spent on the date of the encounter doing chart review, history and exam, documentation, and further activities as noted above.    The longitudinal care of plan for the above diagnoses was addressed during this visit. Due to added complexity of care, we will continue to supprt Sophia Olivarez and the subsequent management of this/these conditions and with ongoing continuity of care for this/these conditions.     This note was dictated by utilizing Dragon software  Copy of this note to primary care physician    Scott Abel MD,FAKENAN,FSVM,FNLA, FACP  Vascular Medicine  Clinical Hypertension Specialist   Clinical Lipidologist

## 2024-07-12 NOTE — PATIENT INSTRUCTIONS
Use pump for one hour a day     Use compression, wraps, elevate legs when able , follow edema therapist recommendations     Consider Lymphatic formula 1000 info given     Follow up in one year

## 2024-07-12 NOTE — PROGRESS NOTES
Patient is here to discuss consult    BP (!) 160/76 (BP Location: Right arm, Patient Position: Chair, Cuff Size: Adult Regular)   Pulse 76   Ht 5' (1.524 m)   Wt 134 lb 3.2 oz (60.9 kg)   SpO2 97%   BMI 26.21 kg/m      Questions patient would like addressed today are: N/A.    Refills are needed: No    Has homecare services and agency name:  [facility]

## 2024-07-15 ENCOUNTER — APPOINTMENT (OUTPATIENT)
Dept: URBAN - METROPOLITAN AREA CLINIC 253 | Age: 84
Setting detail: DERMATOLOGY
End: 2024-07-22

## 2024-07-15 VITALS — RESPIRATION RATE: 14 BRPM | HEIGHT: 60 IN | WEIGHT: 132 LBS

## 2024-07-15 DIAGNOSIS — D18.0 HEMANGIOMA: ICD-10-CM

## 2024-07-15 DIAGNOSIS — L81.4 OTHER MELANIN HYPERPIGMENTATION: ICD-10-CM

## 2024-07-15 DIAGNOSIS — L82.1 OTHER SEBORRHEIC KERATOSIS: ICD-10-CM

## 2024-07-15 DIAGNOSIS — D22 MELANOCYTIC NEVI: ICD-10-CM

## 2024-07-15 DIAGNOSIS — Z71.89 OTHER SPECIFIED COUNSELING: ICD-10-CM

## 2024-07-15 PROBLEM — D22.5 MELANOCYTIC NEVI OF TRUNK: Status: ACTIVE | Noted: 2024-07-15

## 2024-07-15 PROBLEM — D18.01 HEMANGIOMA OF SKIN AND SUBCUTANEOUS TISSUE: Status: ACTIVE | Noted: 2024-07-15

## 2024-07-15 PROCEDURE — OTHER MIPS QUALITY: OTHER

## 2024-07-15 PROCEDURE — OTHER COUNSELING: OTHER

## 2024-07-15 PROCEDURE — 99213 OFFICE O/P EST LOW 20 MIN: CPT

## 2024-07-15 ASSESSMENT — LOCATION ZONE DERM: LOCATION ZONE: TRUNK

## 2024-07-15 ASSESSMENT — LOCATION SIMPLE DESCRIPTION DERM: LOCATION SIMPLE: UPPER BACK

## 2024-07-15 ASSESSMENT — LOCATION DETAILED DESCRIPTION DERM: LOCATION DETAILED: INFERIOR THORACIC SPINE

## 2024-07-15 NOTE — HPI: FULL BODY SKIN EXAMINATION
What Type Of Note Output Would You Prefer (Optional)?: Standard Output
What Is The Reason For Today's Visit?: Full Body Skin Examination
What Is The Reason For Today's Visit? (Being Monitored For X): concerning skin lesions on a periodic basis
Additional History: She has no specific concerns today.

## 2024-07-23 DIAGNOSIS — E78.2 MIXED HYPERLIPIDEMIA: ICD-10-CM

## 2024-07-23 RX ORDER — SIMVASTATIN 10 MG
10 TABLET ORAL AT BEDTIME
Qty: 90 TABLET | Refills: 0 | Status: SHIPPED | OUTPATIENT
Start: 2024-07-23

## 2024-07-24 SDOH — HEALTH STABILITY: PHYSICAL HEALTH: ON AVERAGE, HOW MANY DAYS PER WEEK DO YOU ENGAGE IN MODERATE TO STRENUOUS EXERCISE (LIKE A BRISK WALK)?: 0 DAYS

## 2024-07-24 SDOH — HEALTH STABILITY: PHYSICAL HEALTH: ON AVERAGE, HOW MANY MINUTES DO YOU ENGAGE IN EXERCISE AT THIS LEVEL?: 0 MIN

## 2024-07-24 ASSESSMENT — SOCIAL DETERMINANTS OF HEALTH (SDOH): HOW OFTEN DO YOU GET TOGETHER WITH FRIENDS OR RELATIVES?: MORE THAN THREE TIMES A WEEK

## 2024-07-29 ENCOUNTER — OFFICE VISIT (OUTPATIENT)
Dept: INTERNAL MEDICINE | Facility: CLINIC | Age: 84
End: 2024-07-29
Payer: COMMERCIAL

## 2024-07-29 VITALS
OXYGEN SATURATION: 98 % | TEMPERATURE: 98 F | DIASTOLIC BLOOD PRESSURE: 80 MMHG | RESPIRATION RATE: 18 BRPM | HEART RATE: 66 BPM | BODY MASS INDEX: 25.91 KG/M2 | WEIGHT: 132 LBS | HEIGHT: 60 IN | SYSTOLIC BLOOD PRESSURE: 150 MMHG

## 2024-07-29 DIAGNOSIS — Z00.00 ENCOUNTER FOR MEDICARE ANNUAL WELLNESS EXAM: Primary | ICD-10-CM

## 2024-07-29 DIAGNOSIS — I89.0 LYMPHEDEMA: ICD-10-CM

## 2024-07-29 DIAGNOSIS — E78.2 MIXED HYPERLIPIDEMIA: ICD-10-CM

## 2024-07-29 DIAGNOSIS — I10 ESSENTIAL HYPERTENSION: ICD-10-CM

## 2024-07-29 DIAGNOSIS — M81.0 SENILE OSTEOPOROSIS: ICD-10-CM

## 2024-07-29 LAB
ALBUMIN SERPL BCG-MCNC: 4.5 G/DL (ref 3.5–5.2)
ALP SERPL-CCNC: 70 U/L (ref 40–150)
ALT SERPL W P-5'-P-CCNC: 18 U/L (ref 0–50)
ANION GAP SERPL CALCULATED.3IONS-SCNC: 11 MMOL/L (ref 7–15)
AST SERPL W P-5'-P-CCNC: 25 U/L (ref 0–45)
BILIRUB SERPL-MCNC: 0.6 MG/DL
BUN SERPL-MCNC: 12 MG/DL (ref 8–23)
CALCIUM SERPL-MCNC: 9.5 MG/DL (ref 8.8–10.4)
CHLORIDE SERPL-SCNC: 103 MMOL/L (ref 98–107)
CREAT SERPL-MCNC: 0.51 MG/DL (ref 0.51–0.95)
EGFRCR SERPLBLD CKD-EPI 2021: >90 ML/MIN/1.73M2
GLUCOSE SERPL-MCNC: 95 MG/DL (ref 70–99)
HCO3 SERPL-SCNC: 26 MMOL/L (ref 22–29)
HGB BLD-MCNC: 13 G/DL (ref 11.7–15.7)
POTASSIUM SERPL-SCNC: 4.2 MMOL/L (ref 3.4–5.3)
PROT SERPL-MCNC: 7.4 G/DL (ref 6.4–8.3)
SODIUM SERPL-SCNC: 140 MMOL/L (ref 135–145)

## 2024-07-29 PROCEDURE — 80053 COMPREHEN METABOLIC PANEL: CPT | Performed by: INTERNAL MEDICINE

## 2024-07-29 PROCEDURE — 99213 OFFICE O/P EST LOW 20 MIN: CPT | Mod: 25 | Performed by: INTERNAL MEDICINE

## 2024-07-29 PROCEDURE — 36415 COLL VENOUS BLD VENIPUNCTURE: CPT | Performed by: INTERNAL MEDICINE

## 2024-07-29 PROCEDURE — 99397 PER PM REEVAL EST PAT 65+ YR: CPT | Performed by: INTERNAL MEDICINE

## 2024-07-29 PROCEDURE — 85018 HEMOGLOBIN: CPT | Performed by: INTERNAL MEDICINE

## 2024-07-29 NOTE — PROGRESS NOTES
"Preventive Care Visit  [facility]  Monica Sultana MD, Internal Medicine  Jul 29, 2024       Assessment & Plan     (Z00.00) Encounter for Medicare annual wellness exam  (primary encounter diagnosis)  Plan: Comprehensive metabolic panel, Hemoglobin, mammogram due 9/24, immunizations reviewed            (I10) Essential hypertension  Plan: Elevated systolic blood pressure, blood pressure checked x 3, systolic blood pressure in 150s, patient is currently on lisinopril 5 mg daily but pt does not want to increase the dose and states that her blood pressure is normal at home.  Patient was advised to make a nurse only blood pressure check appointment in 2 weeks, continue to follow low-sodium diet and regular exercise    (E78.2) Mixed hyperlipidemia  Plan: On simvastatin    (M81.0) Senile osteoporosis  Plan: On Reclast through endocrinologist    (I89.0) Lymphedema  Plan: Lymphedema Therapy  Referral             Patient has been advised of split billing requirements and indicates understanding: Yes        BMI  Estimated body mass index is 26 kg/m  as calculated from the following:    Height as of this encounter: 1.518 m (4' 11.75\").    Weight as of this encounter: 59.9 kg (132 lb).       Counseling  Appropriate preventive services were addressed with this patient via screening, questionnaire, or discussion as appropriate for fall prevention, nutrition, physical activity.         Deb Kingston is a 83 year old, presenting for the following:  Wellness Visit (Adult rehab in [facility])        7/29/2024     8:45 AM   Additional Questions   Roomed by Scot   Accompanied by None         7/29/2024     8:45 AM   Patient Reported Additional Medications   Patient reports taking the following new medications None         Health Care Directive  Patient has a Health Care Directive on file      HPI     Hyperlipidemia Follow-Up    Are you regularly taking any medication or supplement to lower " your cholesterol?   Yes- simvastatin   Are you having muscle aches or other side effects that you think could be caused by your cholesterol lowering medication?  No    Hypertension Follow-up    Do you check your blood pressure regularly outside of the clinic? Yes  ,home BP are normal per pt. On 06/18/24 BP was 149/79  (not 479/79,pt brought sheet the written BP)  Are you following a low salt diet? Yes  Are your blood pressures ever more than 140 on the top number (systolic) OR more   than 90 on the bottom number (diastolic), for example 140/90? Yes      History of osteoporosis, was on Boniva in the past, DEXA last year showed decreased bone density, patient has been following up with endocrinologist and is on Reclast    History of lymphedema secondary to hysterectomy and lymph node dissection for endometrial cancer, requesting referral to lymphedema clinic    History of thyroid nodules: Follows up with endocrinologist    Pt is requesting referral for lymphoedema clinic        7/24/2024   General Health   How would you rate your overall physical health? Good   Feel stress (tense, anxious, or unable to sleep) Only a little      (!) STRESS CONCERN      7/24/2024   Nutrition   Diet: Regular (no restrictions)            7/24/2024   Exercise   Days per week of moderate/strenous exercise 0 days   Average minutes spent exercising at this level 0 min      (!) EXERCISE CONCERN      7/24/2024   Social Factors   Frequency of gathering with friends or relatives More than three times a week   Worry food won't last until get money to buy more No    No   Food not last or not have enough money for food? No    No   Do you have housing? (Housing is defined as stable permanent housing and does not include staying ouside in a car, in a tent, in an abandoned building, in an overnight shelter, or couch-surfing.) Yes      Are you worried about losing your housing? No    Lack of transportation? No    Unable to get utilities  (heat,electricity)? No        Multiple values from one day are sorted in reverse-chronological order         7/24/2024   Fall Risk   Fallen 2 or more times in the past year? No   Trouble with walking or balance? No             7/24/2024   Activities of Daily Living- Home Safety   Needs help with the following daily activites None of the above   Safety concerns in the home None of the above            7/24/2024   Dental   Dentist two times every year? Yes            7/24/2024   Hearing Screening   Hearing concerns? None of the above            7/24/2024   Driving Risk Screening   Patient/family members have concerns about driving No            7/24/2024   General Alertness/Fatigue Screening   Have you been more tired than usual lately? No            7/24/2024   Urinary Incontinence Screening   Bothered by leaking urine in past 6 months No            7/24/2024   TB Screening   Were you born outside of the [place]? No            Today's PHQ-2 Score:       7/29/2024     8:31 AM   PHQ-2 ( 1999 [employer])   Q1: Little interest or pleasure in doing things 0   Q2: Feeling down, depressed or hopeless 0   PHQ-2 Score 0   Q1: Little interest or pleasure in doing things Not at all   Q2: Feeling down, depressed or hopeless Not at all   PHQ-2 Score 0           7/24/2024   Substance Use   Alcohol more than 3/day or more than 7/wk Not Applicable   Do you have a current opioid prescription? No   How severe/bad is pain from 1 to 10? 0/10 (No Pain)   Do you use any other substances recreationally? No        Social History     Tobacco Use    Smoking status: Never    Smokeless tobacco: Never   Vaping Use    Vaping status: Never Used   Substance Use Topics    Alcohol use: No    Drug use: No          Last mammogram 9/23             Reviewed and updated as needed this visit by Provider                    Past Medical History:   Diagnosis Date    Benign neoplasm of skin, site unspecified     sees derm.    Calculus of kidney     3 passed spont.     Chronic subinvolution of uterus     Disorder of bone and cartilage, unspecified     osteopenia    Dyspepsia and other specified disorders of function of stomach     dyspepsia    Generalized osteoarthrosis, unspecified site     H/O thyroid nodule     f/u with endocrinologist    Hematuria     microscopic    HTN (hypertension)     Malignant neoplasm of corpus uteri, except isthmus (H)     surgery and radiation rx.stage IC, grade 3 endometrial carcinoma.    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     Nontoxic multinodular goiter     sees endocrine    Other and unspecified hyperlipidemia      Past Surgical History:   Procedure Laterality Date    ABDOMEN SURGERY  2005    uterine cancer Hysterectiomy    BIOPSY      many times from nodules from thyroid    CHOLECYSTECTOMY  1992    GYN SURGERY  2005    uterine cancer    HEAD & NECK SURGERY      right thyroid taken out    [facility] NONSPECIFIC PROCEDURE  1992    Cholecystectomy. abstracted 6/24/02.    [facility] NONSPECIFIC PROCEDURE      Thyroidectomy. abstracted 6/24/02.    [facility] NONSPECIFIC PROCEDURE      Dilatation & Curettage X 2. abstracted 6/24/02.    [facility] NONSPECIFIC PROCEDURE      Cystoscopy. abstracted 6/24/02.    [facility] NONSPECIFIC PROCEDURE      hysterctomy for uterine cancer.    [facility] COLONOSCOPY THRU STOMA, DIAGNOSTIC  10/2007    due q 5 yrs     Current Outpatient Medications   Medication Sig Dispense Refill    Ascorbic Acid (VITAMIN C CR) 1000 MG TBCR       fluticasone (FLONASE) 50 MCG/ACT nasal spray INSTILL 2 SPRAYS INTO BOTH NOSTRILS DAILY 16 g 3    lisinopril (ZESTRIL) 5 MG tablet Take 1 tablet (5 mg) by mouth daily 90 tablet 1    omega-3 fatty acids 1200 MG capsule Take 1 capsule by mouth daily 90 capsule     saccharomyces boulardii (FLORASTOR) 250 MG capsule Take 250 mg by mouth 2 times daily      simvastatin (ZOCOR) 10 MG tablet TAKE 1 TABLET (10 MG) BY MOUTH AT BEDTIME 90 tablet 0     "Cholecalciferol (VITAMIN D3 PO) Take 2,000 Units by mouth daily (Patient not taking: Reported on 7/29/2024)      Multiple Vitamins-Minerals (MULTIVITAMIN & MINERAL PO) Take 1 tablet by mouth daily (Patient not taking: Reported on 7/29/2024)       Current providers sharing in care for this patient include:  Patient Care Team:  Monica Sultana MD as PCP - General  Monica Sultana MD as Assigned PCP  Levy Lockwood MD as Assigned Surgical Provider  Adams Velasquez MD as Assigned Heart and Vascular Provider    The following health maintenance items are reviewed in Epic and correct as of today:  Health Maintenance   Topic Date Due    RSV VACCINE (Pregnancy & 60+) (1 - 1-dose 60+ series) Never done    ANNUAL REVIEW OF  ORDERS  07/20/2023    COVID-19 Vaccine (5 - 2023-24 season) 02/20/2024    MEDICARE ANNUAL WELLNESS VISIT  07/24/2024    TSH W/FREE T4 REFLEX  08/04/2024    INFLUENZA VACCINE (1) 09/01/2024    LIPID  01/15/2025    FALL RISK ASSESSMENT  07/29/2025    ADVANCE CARE PLANNING  07/24/2028    DTAP/TDAP/TD IMMUNIZATION (3 - Td or Tdap) 06/03/2029    DEXA  09/08/2038    PHQ-2 (once per calendar year)  Completed    Pneumococcal Vaccine: 65+ Years  Completed    ZOSTER IMMUNIZATION  Completed    IPV IMMUNIZATION  Aged Out    HPV IMMUNIZATION  Aged Out    MENINGITIS IMMUNIZATION  Aged Out    RSV MONOCLONAL ANTIBODY  Aged Out    MAMMO SCREENING  Discontinued          Objective    Exam  BP (!) 150/80   Pulse 66   Temp 98  F (36.7  C) (Oral)   Resp 18   Ht 1.518 m (4' 11.75\")   Wt 59.9 kg (132 lb)   SpO2 98%   BMI 26.00 kg/m     Estimated body mass index is 26 kg/m  as calculated from the following:    Height as of this encounter: 1.518 m (4' 11.75\").    Weight as of this encounter: 59.9 kg (132 lb).    Physical Exam  GENERAL: alert and no distress  EYES: Eyes grossly normal to inspection, PERRL and conjunctivae and sclerae normal  HENT: ear canals and TM's normal, nose and " mouth without ulcers or lesions  RESP: lungs clear to auscultation - no rales, rhonchi or wheezes  BREAST: Patient declined  CV: regular rate and rhythm, normal S1 S2,    ABDOMEN: soft, nontender,  and bowel sounds normal  MS: Lymphedema LE noted, no calf tenderness bilaterally  NEURO: Normal strength and tone, mentation intact and speech normal  PSYCH: mentation appears normal, affect normal/bright        7/29/2024   Mini Cog   Clock Draw Score 2 Normal   3 Item Recall 3 objects recalled   Mini Cog Total Score 5                 Signed Electronically by: Monica Sulatna MD

## 2024-08-05 ENCOUNTER — TRANSFERRED RECORDS (OUTPATIENT)
Dept: HEALTH INFORMATION MANAGEMENT | Facility: CLINIC | Age: 84
End: 2024-08-05
Payer: COMMERCIAL

## 2024-08-07 DIAGNOSIS — I10 ESSENTIAL HYPERTENSION: ICD-10-CM

## 2024-08-08 RX ORDER — LISINOPRIL 5 MG/1
5 TABLET ORAL DAILY
Qty: 90 TABLET | Refills: 0 | Status: SHIPPED | OUTPATIENT
Start: 2024-08-08

## 2024-08-12 ENCOUNTER — HOSPITAL ENCOUNTER (OUTPATIENT)
Dept: ULTRASOUND IMAGING | Facility: CLINIC | Age: 84
Discharge: HOME | End: 2024-08-12
Attending: INTERNAL MEDICINE | Admitting: INTERNAL MEDICINE
Payer: COMMERCIAL

## 2024-08-12 ENCOUNTER — ALLIED HEALTH/NURSE VISIT (OUTPATIENT)
Dept: INTERNAL MEDICINE | Facility: CLINIC | Age: 84
End: 2024-08-12
Payer: COMMERCIAL

## 2024-08-12 VITALS — SYSTOLIC BLOOD PRESSURE: 148 MMHG | HEART RATE: 82 BPM | DIASTOLIC BLOOD PRESSURE: 78 MMHG

## 2024-08-12 DIAGNOSIS — I10 ESSENTIAL HYPERTENSION: Primary | ICD-10-CM

## 2024-08-12 DIAGNOSIS — E04.2 MULTINODULAR GOITER: ICD-10-CM

## 2024-08-12 PROCEDURE — 76536 US EXAM OF HEAD AND NECK: CPT

## 2024-08-12 PROCEDURE — 99207 PR NO CHARGE NURSE ONLY: CPT

## 2024-08-29 ENCOUNTER — THERAPY VISIT (OUTPATIENT)
Dept: OCCUPATIONAL THERAPY | Facility: CLINIC | Age: 84
End: 2024-08-29
Payer: COMMERCIAL

## 2024-08-29 DIAGNOSIS — I89.0 LYMPHEDEMA: Primary | ICD-10-CM

## 2024-08-29 PROCEDURE — 97165 OT EVAL LOW COMPLEX 30 MIN: CPT | Mod: GO

## 2024-08-29 PROCEDURE — 97535 SELF CARE MNGMENT TRAINING: CPT | Mod: GO

## 2024-08-30 NOTE — PROGRESS NOTES
OCCUPATIONAL THERAPY EVALUATION  Type of Visit: Evaluation             Subjective      Presenting condition or subjective complaint: follow up with lympedema  Date of onset: 07/29/24    Relevant medical history:     Past Medical History:   Diagnosis Date    Benign neoplasm of skin, site unspecified     sees derm.    Calculus of kidney     3 passed spont.    Chronic subinvolution of uterus     Disorder of bone and cartilage, unspecified     osteopenia    Dyspepsia and other specified disorders of function of stomach     dyspepsia    Generalized osteoarthrosis, unspecified site     H/O thyroid nodule     f/u with endocrinologist    Hematuria     microscopic    HTN (hypertension)     Malignant neoplasm of corpus uteri, except isthmus (H)     surgery and radiation rx.stage IC, grade 3 endometrial carcinoma.    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     Nontoxic multinodular goiter     sees endocrine    Other and unspecified hyperlipidemia       Dates & types of surgery: gall bladder/uterine cancer/thyroid rgt side removal/skin cancer along left cheek/calcium injection/  Past Surgical History:   Procedure Laterality Date    ABDOMEN SURGERY  2005    uterine cancer Hysterectiomy    BIOPSY      many times from nodules from thyroid    CHOLECYSTECTOMY  1992    GYN SURGERY  2005    uterine cancer    HEAD & NECK SURGERY      right thyroid taken out    [facility] NONSPECIFIC PROCEDURE  1992    Cholecystectomy. abstracted 6/24/02.    [facility] NONSPECIFIC PROCEDURE      Thyroidectomy. abstracted 6/24/02.    [facility] NONSPECIFIC PROCEDURE      Dilatation & Curettage X 2. abstracted 6/24/02.    [facility] NONSPECIFIC PROCEDURE      Cystoscopy. abstracted 6/24/02.    [facility] NONSPECIFIC PROCEDURE      hysterctomy for uterine cancer.    [facility] COLONOSCOPY THRU STOMA, DIAGNOSTIC  10/2007    due q 5 yrs        Prior diagnostic imaging/testing results: CT scan; Bone scan      Prior therapy history for the same diagnosis, illness or injury: Yes many dates and times    Prior Level of Function  Transfers: Independent  Ambulation: Independent  ADL: Independent  IADL:  spouse assist with heavy IADL    Living Environment  Social support: With a significant other or spouse   Type of home: House   Stairs to enter the home: Yes 13 Is there a railing: Yes     Ramp: No   Stairs inside the home: Yes 13 Is there a railing: Yes     Help at home: None  Equipment owned: none      Employment: No    Hobbies/Interests: car rides/puzzles/games/friends/flower beds and plants etc    Patient goals for therapy: have more than that issue  affects me more Osteroposis/bursitis,arthritis, bone problems etc    Pain assessment: Pain denied, though patient cites bursitis pain, none at rest     Objective       EDEMA EVALUATION  Additional history:  Body part affected by edema: rgt leg  If cancer related, treatment:    If not cancer related, problems with veins or cause of swelling: nodules removed during uterine cancer  Distance able to walk: 3 miles mostly or 6,000 steps + sometimes  Time able to stand: quite awhile  rather than sitting  Sensation problems in hands/feet: No    Edema etiology: Cancer with lymph node dissection, Chronic Venous Insufficiency      Cognitive Status Examination  Orientation: Oriented to person, place and time   Level of Consciousness: Alert  Follows Commands and Answers Questions: 100% of the time  Personal Safety and Judgement: Intact  Memory:  needs reinforcement of ed provided    EDEMA  Skin Condition: Hemosiderin deposits, Pitting, Venous distention      GIRTH MEASUREMENTS: Refer to separate girth measurement flowsheet.       VOLUME LE  Right LE (mL) 5706 mL   Left LE (mL) 5059 mL   LE Volume Comparison RLE volume greater than LLE volume   % Difference 11%                 RANGE OF MOTION: LE ROM WFL  STRENGTH: LE Strength WFL  ACTIVITIES OF DAILY LIVING: ind  BED MOBILITY:  WFL  TRANSFERS: WFL      Assessment & Plan   CLINICAL IMPRESSIONS  Medical Diagnosis: lymphedema    Treatment Diagnosis: lymphedema BLE    Impression/Assessment:  Patient known to this clinic through prior episode of care; returns today for assessment.  Patient has been compliant with home program, though more difficult now due to BL hip/bursitis pain.  HP discussed, new garment order submitted; patient to follow up in ~ 3 weeks to determine efficacy of HP.    Clinical Decision Making (Complexity):  Assessment of Occupational Performance: 1-3 Performance Deficits  Occupational Performance Limitations: functional mobility 2/2 to recent onset of pain and home establishment and management/requires assist with heavy IADL  Clinical Decision Making (Complexity): Low complexity    PLAN OF CARE  Treatment Interventions:  Interventions: Self-Care/Home Management, Therapeutic Exercise, Manual Therapy    Long Term Goals   OT Goal 1  Goal Identifier: home program  Goal Description: For long-term home mgmt chronic lymphedema pt/caregiver independent in home program a. GCB/GCB alternative garment for night wear b. compression garment for day wear c. ex to incr lymph flow/self-MLD.  Rationale: In order to maximize safety and independence with performance of self-care activities;In order to maximize safety and independence with ADL/IADLs;In order to maximize safety and independence with functional transfers and functional mobility within the home or community  Target Date: 10/29/24  OT Goal 2  Goal Identifier: volume  Goal Description: For decreased risk of infection, skin breakdown/wounds & progressive soft-tissue fibrosis and improved fit of footwear, volume will be reduced by at least 200 mL in RLE.  Rationale: In order to maximize safety and independence with performance of self-care activities;In order to maximize safety and independence with ADL/IADLs;In order to maximize safety and independence with functional transfers and  functional mobility within the home or community  Target Date: 10/29/24  OT Goal 3  Goal Identifier: lymphedema precautions  Goal Description: Pt will independently verbalize the signs/symptoms of lymphedema, precautions and how to obtain a future lymphedema referral if edema persists to preserve skin integrity, prevent infection and preserve functional mobility.  Rationale: In order to maximize safety and independence with performance of self-care activities;In order to maximize safety and independence with ADL/IADLs;In order to maximize safety and independence with functional transfers and functional mobility within the home or community  Target Date: 10/29/24      Frequency of Treatment: 1x/month  Duration of Treatment: 3 months     Recommended Referrals to Other Professionals:  currently scheduled to see PT at [facility]  Education Assessment: Learner/Method: Patient  Education Comments: needs reinforcement of ed provided     Risks and benefits of evaluation/treatment have been explained.   Patient/Family/caregiver agrees with Plan of Care.     Evaluation Time:    OT Eval, Low Complexity Minutes (23805): 20      Signing Clinician: Tila Flores OT

## 2024-09-02 ENCOUNTER — TELEPHONE (OUTPATIENT)
Dept: INTERNAL MEDICINE | Facility: CLINIC | Age: 84
End: 2024-09-02
Payer: COMMERCIAL

## 2024-09-02 NOTE — TELEPHONE ENCOUNTER
Medication Question or Refill        What medication are you calling about (include dose and sig)?: Tylenol 8 HOUR 840 MG.  For arthrisit per [address] [facility] at [facility]     Preferred Pharmacy:   [facility] [place], [place] - [address]  [address] [address]  Phone: [phone] Fax: [phone]    [id number] - [facility] [address] [place] [facility] [place] [address] [address] [address]  [facility] [address]  Phone: [phone] Fax: [phone]      Controlled Substance Agreement on file:   CSA -- Patient Level:    CSA: None found at the patient level.       Who prescribed the medication?: see above    Do you need a refill? No    When did you use the medication last? currently    Patient offered an appointment? No    Do you have any questions or concerns?  Yes: How does patient know if liver and kidneys okay being taking this medication a long time.      Could we [facility] this [facility] you [facility] or would you prefer to receive a phone call?:   Patient would prefer a phone call   Okay to leave a detailed message?: Yes at Home number on file [phone] (home)

## 2024-09-02 NOTE — TELEPHONE ENCOUNTER
A1C results and wants to know about Diabetes and a biopsy she wants to get per her Endocrinologist.    Thank you.

## 2024-09-04 NOTE — TELEPHONE ENCOUNTER
Called patient back       Patient wondering if she can take the Tylenol 8 hour for arthritis. Dose 650 mg acetaminophen- taking 2 tabs in am and pm and one tab in between.  Patient wondering if this is ok to take with her kidneys.  Please advise, thanks.        Per patient, her A1C is now 5.7(had appointment with endo) and is wanting to know how she can lower her A1C.  Sent patient a myc message with information on a diabetic diet.      Myc message sent to patient regarding diabetic diet information

## 2024-09-12 ENCOUNTER — TELEPHONE (OUTPATIENT)
Dept: INTERNAL MEDICINE | Facility: CLINIC | Age: 84
End: 2024-09-12
Payer: COMMERCIAL

## 2024-09-12 NOTE — TELEPHONE ENCOUNTER
General Call      Reason for Call:  QUESTION FOR PCP   What are your questions or concerns:  PT WANTS TO KNOW WHEN SHE NEEDS TO COME IN FOR A PHYSICAL AND WHEN BECAUSE DURING THE WELLNESS SHE SAYS SHE COULDNT ASK ANY QUESTIONS. PT WOULD LIKE TO ASK ABOUT THE INCREASE OF THE BP MEDICATION THAT WAS PREVIOUSLY TALKED ABOUT.      Date of last appointment with provider: 07/29/2024    Could we send this information to you in [employer] or would you prefer to receive a phone call?:   Patient would like to be contacted via S[employer]url

## 2024-09-17 NOTE — TELEPHONE ENCOUNTER
HISTORY: Left lower leg trauma, calf swelling



COMPARISONS: None



VIEWS: 8, Frontal and lateral views of the left foreleg, frontal, lateral, and oblique

views of the left foot, frontal, lateral, and oblique views of the left ankle



FINDINGS:



BONE DENSITY: Normal.

BONES: There is no displaced fracture. Incidentally noted is an exostosis of the distal

fifth metatarsal

JOINTS: There is no arthropathy.    

ALIGNMENT: There is no dislocation. 

SOFT TISSUES: Unremarkable.



OTHER FINDINGS: None.



IMPRESSION: 

NO ACUTE OSSEOUS INJURY TO THE LEFT FORELEG, ANKLE, OR FOOT. IF SYMPTOMS PERSIST,

RECOMMEND REPEAT IMAGING. Pt can take tylenol arthritis 650 mg 2 tabs bid -total of 2600 mg /day , we should not exceed 3000 mg /day.   Please advise patient to follow ADA diet and regular exercise to get the A1c under control

## 2024-10-09 ENCOUNTER — OFFICE VISIT (OUTPATIENT)
Dept: INTERNAL MEDICINE | Facility: CLINIC | Age: 84
End: 2024-10-09
Payer: COMMERCIAL

## 2024-10-09 VITALS
BODY MASS INDEX: 25.19 KG/M2 | SYSTOLIC BLOOD PRESSURE: 148 MMHG | WEIGHT: 128.3 LBS | OXYGEN SATURATION: 98 % | HEART RATE: 94 BPM | RESPIRATION RATE: 15 BRPM | TEMPERATURE: 97.7 F | HEIGHT: 60 IN | DIASTOLIC BLOOD PRESSURE: 80 MMHG

## 2024-10-09 DIAGNOSIS — E78.2 MIXED HYPERLIPIDEMIA: ICD-10-CM

## 2024-10-09 DIAGNOSIS — I10 ESSENTIAL HYPERTENSION: Primary | ICD-10-CM

## 2024-10-09 DIAGNOSIS — R73.03 PREDIABETES: ICD-10-CM

## 2024-10-09 DIAGNOSIS — M81.0 SENILE OSTEOPOROSIS: ICD-10-CM

## 2024-10-09 LAB
EST. AVERAGE GLUCOSE BLD GHB EST-MCNC: 105 MG/DL
HBA1C MFR BLD: 5.3 % (ref 0–5.6)

## 2024-10-09 PROCEDURE — G2211 COMPLEX E/M VISIT ADD ON: HCPCS | Performed by: INTERNAL MEDICINE

## 2024-10-09 PROCEDURE — 83036 HEMOGLOBIN GLYCOSYLATED A1C: CPT | Performed by: INTERNAL MEDICINE

## 2024-10-09 PROCEDURE — 80048 BASIC METABOLIC PNL TOTAL CA: CPT | Performed by: INTERNAL MEDICINE

## 2024-10-09 PROCEDURE — 99214 OFFICE O/P EST MOD 30 MIN: CPT | Performed by: INTERNAL MEDICINE

## 2024-10-09 PROCEDURE — 36415 COLL VENOUS BLD VENIPUNCTURE: CPT | Performed by: INTERNAL MEDICINE

## 2024-10-09 RX ORDER — AMLODIPINE BESYLATE 5 MG/1
5 TABLET ORAL DAILY
Qty: 30 TABLET | Refills: 0 | Status: SHIPPED | OUTPATIENT
Start: 2024-10-09 | End: 2024-10-29

## 2024-10-09 RX ORDER — SIMVASTATIN 10 MG
10 TABLET ORAL AT BEDTIME
Qty: 90 TABLET | Refills: 1 | Status: SHIPPED | OUTPATIENT
Start: 2024-10-09

## 2024-10-09 NOTE — PROGRESS NOTES
Assessment & Plan     (I10) Essential hypertension  (primary encounter diagnosis)  Plan: elevated systolic blood pressure, will discontinue lisinopril, started on amLODIPine (NORVASC) 5 MG tablet, 1 tablet daily as directed.explained clearly about the medication,insructions and side effects. Advised to follow low salt diet and exercise and f/u in 3-4 weeks    Check  basic metabolic panel            (R73.03) Prediabetes  Plan: Hemoglobin A1c, continue to follow ADA diet and regular exercise            (M81.0) Senile osteoporosis  Plan: On Reclast through endocrinology, last DEXA 9/23      (E78.2) Mixed hyperlipidemia  Plan: Last lipids reviewed, stable, refilled simvastatin (ZOCOR) 10 MG tablet as directed.explained clearly about the medication,insructions and side effects.            The longitudinal plan of care for the diagnosis(es)/condition(s) as documented were addressed during this visit. Due to the added complexity in care, I will continue to support Vashti in the subsequent management and with ongoing continuity of care.        Subjective   Vashti is a 83 year old, presenting for the following health issues:  Consult (A few concerns)      10/9/2024     8:35 AM   Additional Questions   Roomed by Giorgio Cisneros   Accompanied by self         10/9/2024     8:35 AM   Patient Reported Additional Medications   Patient reports taking the following new medications no     History of Present Illness       Reason for visit:  My ailments I have been having  about bone density test when , A-1C ?   blood pressure what is it suppose to be for me, do I need RSVP, etc    She eats 4 or more servings of fruits and vegetables daily.She consumes 0 sweetened beverage(s) daily.She exercises with enough effort to increase her heart rate 30 to 60 minutes per day.  She exercises with enough effort to increase her heart rate 7 days per week.   She is taking medications regularly.        Hypertension Follow-up    Do you check your blood  pressure regularly outside of the clinic? Yes   Are you following a low salt diet? Yes  Are your blood pressures ever more than 140 on the top number (systolic) OR more   than 90 on the bottom number (diastolic), for example 140/90? Yes      Hyperlipidemia Follow-Up    Are you regularly taking any medication or supplement to lower your cholesterol?   Yes- simvastatin  Are you having muscle aches or other side effects that you think could be caused by your cholesterol lowering medication?  No    History of prediabetes last A1c more than 6 months ago    History of osteoporosis: On Reclast through endocrinology, last DEXA 9/23    History of multiple thyroid nodules; follows up with endocrinology    Patient is also requesting handicap parking form filled, has history of lymphedema and lumbar radiculopathy ,follows up with orthopedics getting steroid injections, uses walker for walking outside.        Past Medical History:   Diagnosis Date    Benign neoplasm of skin, site unspecified     sees derm.    Calculus of kidney     3 passed spont.    Chronic subinvolution of uterus     Disorder of bone and cartilage, unspecified     osteopenia    Dyspepsia and other specified disorders of function of stomach     dyspepsia    Generalized osteoarthrosis, unspecified site     H/O thyroid nodule     f/u with endocrinologist    Hematuria     microscopic    HTN (hypertension)     Malignant neoplasm of corpus uteri, except isthmus (H)     surgery and radiation rx.stage IC, grade 3 endometrial carcinoma.    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     Nontoxic multinodular goiter     sees endocrine    Other and unspecified hyperlipidemia        Current Outpatient Medications   Medication Sig Dispense Refill    amLODIPine (NORVASC) 5 MG tablet Take 1 tablet (5 mg) by mouth daily. 30 tablet 0    Ascorbic Acid (VITAMIN C CR) 1000 MG TBCR        "Cholecalciferol (VITAMIN D3 PO) Take 2,000 Units by mouth daily.      fluticasone (FLONASE) 50 MCG/ACT nasal spray INSTILL 2 SPRAYS INTO BOTH NOSTRILS DAILY 16 g 3    Multiple Vitamins-Minerals (MULTIVITAMIN & MINERAL PO) Take 1 tablet by mouth daily.      omega-3 fatty acids 1200 MG capsule Take 1 capsule by mouth daily 90 capsule     saccharomyces boulardii (FLORASTOR) 250 MG capsule Take 250 mg by mouth 2 times daily      simvastatin (ZOCOR) 10 MG tablet TAKE 1 TABLET (10 MG) BY MOUTH AT BEDTIME 90 tablet 0           Review of Systems  CONSTITUTIONAL: NEGATIVE for fever, chills,    RESP: NEGATIVE for significant cough or SOB  CV: NEGATIVE for chest pain, palpitations or peripheral edema  MUSCULOSKELETAL: Lymphedema      Objective    BP (!) 151/83 (BP Location: Left arm, Patient Position: Sitting, Cuff Size: Adult Regular)   Pulse 94   Temp 97.7  F (36.5  C) (Tympanic)   Resp 15   Ht 1.518 m (4' 11.75\")   Wt 58.2 kg (128 lb 4.8 oz)   SpO2 98%   Breastfeeding No   BMI 25.27 kg/m    Body mass index is 25.27 kg/m .  Physical Exam   GENERAL: alert and no distress  EYES: Eyes grossly normal to inspection, PERRL and conjunctivae and sclerae normal  RESP: lungs clear to auscultation - no rales, rhonchi or wheezes  CV: regular rate and rhythm, normal S1 S2,    MS: Rt LE Lymphedema  PSYCH: mentation appears normal, affect normal/bright            Signed Electronically by: Monica Sultana MD  History of osteoporosis: On Reclast through endocrinology  "

## 2024-10-10 LAB
ANION GAP SERPL CALCULATED.3IONS-SCNC: 12 MMOL/L (ref 7–15)
BUN SERPL-MCNC: 12.4 MG/DL (ref 8–23)
CALCIUM SERPL-MCNC: 9.7 MG/DL (ref 8.8–10.4)
CHLORIDE SERPL-SCNC: 98 MMOL/L (ref 98–107)
CREAT SERPL-MCNC: 0.57 MG/DL (ref 0.51–0.95)
EGFRCR SERPLBLD CKD-EPI 2021: >90 ML/MIN/1.73M2
GLUCOSE SERPL-MCNC: 91 MG/DL (ref 70–99)
HCO3 SERPL-SCNC: 28 MMOL/L (ref 22–29)
POTASSIUM SERPL-SCNC: 4.2 MMOL/L (ref 3.4–5.3)
SODIUM SERPL-SCNC: 138 MMOL/L (ref 135–145)

## 2024-10-13 ENCOUNTER — MYC MEDICAL ADVICE (OUTPATIENT)
Dept: INTERNAL MEDICINE | Facility: CLINIC | Age: 84
End: 2024-10-13
Payer: COMMERCIAL

## 2024-10-14 NOTE — TELEPHONE ENCOUNTER
Please see patient's mychart message.     Please advise, thanks.    Agustín Holguin, Triage RN [facility] [place]  2:20 PM 10/14/2024

## 2024-10-18 NOTE — TELEPHONE ENCOUNTER
[employer] message sent to patient with provider recommendations.     Dinorah Epstein RN  [facility]

## 2024-10-29 ENCOUNTER — OFFICE VISIT (OUTPATIENT)
Dept: INTERNAL MEDICINE | Facility: CLINIC | Age: 84
End: 2024-10-29
Payer: COMMERCIAL

## 2024-10-29 VITALS
DIASTOLIC BLOOD PRESSURE: 66 MMHG | RESPIRATION RATE: 13 BRPM | BODY MASS INDEX: 25.62 KG/M2 | HEART RATE: 93 BPM | WEIGHT: 130.5 LBS | SYSTOLIC BLOOD PRESSURE: 130 MMHG | OXYGEN SATURATION: 97 % | HEIGHT: 60 IN | TEMPERATURE: 97.2 F

## 2024-10-29 DIAGNOSIS — Z02.89 ENCOUNTER FOR COMPLETION OF FORM WITH PATIENT: ICD-10-CM

## 2024-10-29 DIAGNOSIS — I89.0 LYMPHEDEMA: ICD-10-CM

## 2024-10-29 DIAGNOSIS — I10 ESSENTIAL HYPERTENSION: Primary | ICD-10-CM

## 2024-10-29 PROCEDURE — 99214 OFFICE O/P EST MOD 30 MIN: CPT | Performed by: INTERNAL MEDICINE

## 2024-10-29 RX ORDER — AMLODIPINE BESYLATE 5 MG/1
5 TABLET ORAL DAILY
Qty: 90 TABLET | Refills: 1 | Status: SHIPPED | OUTPATIENT
Start: 2024-10-29

## 2024-10-29 NOTE — NURSING NOTE
"/68   Pulse 93   Temp 97.2  F (36.2  C) (Tympanic)   Resp 13   Ht 1.518 m (4' 11.75\")   Wt 59.2 kg (130 lb 8 oz)   SpO2 97%   BMI 25.70 kg/m      "

## 2024-10-29 NOTE — PROGRESS NOTES
Assessment & Plan     (I10) Essential hypertension  (primary encounter diagnosis)  Comment: BP has improved  Plan: amLODIPine (NORVASC) 5 MG tablet refilled.explained clearly about the medication,insructions and side effects.  Advised to follow low salt diet and exercise and f/u in 6 mths.             (I89.0) Lymphedema  Plan: Lt Leg, has lymphedema wrap.       (Z02.89) Encounter for completion of form with patient  Plan: completed handicap parking form with pt .       Prescription drug management  31 minutes spent by me on the date of the encounter doing chart review, history and exam, documentation and further activities per the note         Subjective   Vashti is a 84 year old, presenting for the following health issues:  Forms and Recheck Medication        10/29/2024     8:20 AM   Additional Questions   Roomed by kelly   Accompanied by self         10/29/2024     8:20 AM   Patient Reported Additional Medications   Patient reports taking the following new medications none     History of Present Illness       Reason for visit:  Follow up on blood pressure and my high pulse rate new medicine  about lower weight why  and abt a EKG and  stomach issues  and to have the handicap form filled out   She is taking medications regularly.       Hypertension Follow-up    Do you check your blood pressure regularly outside of the clinic? Yes  139/73 with home monitor , pts lisinopril was discontinued and started on amlodipine 5 mg daily, tolerating well.  Are you following a low salt diet? Yes  Are your blood pressures ever more than 140 on the top number (systolic) OR more   than 90 on the bottom number (diastolic), for example 140/90? Yes      Pt is requesting handicap parking permit form filled, has h/o lumbar radiculopathy, follows up with ortho and had had cortisone inj, uses walker , also has rt Leg lymphadenopathy .      Past Medical History:   Diagnosis Date    Benign neoplasm of skin, site unspecified     sees derm.  "   Calculus of kidney     3 passed spont.    Chronic subinvolution of uterus     Disorder of bone and cartilage, unspecified     osteopenia    Dyspepsia and other specified disorders of function of stomach     dyspepsia    Generalized osteoarthrosis, unspecified site     H/O thyroid nodule     f/u with endocrinologist    Hematuria     microscopic    HTN (hypertension)     Malignant neoplasm of corpus uteri, except isthmus (H)     surgery and radiation rx.stage IC, grade 3 endometrial carcinoma.    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     Nontoxic multinodular goiter     sees endocrine    Other and unspecified hyperlipidemia          Current Outpatient Medications   Medication Sig Dispense Refill    amLODIPine (NORVASC) 5 MG tablet Take 1 tablet (5 mg) by mouth daily. 90 tablet 1    Ascorbic Acid (VITAMIN C CR) 1000 MG TBCR       Cholecalciferol (VITAMIN D3 PO) Take 2,000 Units by mouth daily.      fluticasone (FLONASE) 50 MCG/ACT nasal spray INSTILL 2 SPRAYS INTO BOTH NOSTRILS DAILY 16 g 3    Multiple Vitamins-Minerals (MULTIVITAMIN & MINERAL PO) Take 1 tablet by mouth daily.      omega-3 fatty acids 1200 MG capsule Take 1 capsule by mouth daily 90 capsule     saccharomyces boulardii (FLORASTOR) 250 MG capsule Take 250 mg by mouth 2 times daily      simvastatin (ZOCOR) 10 MG tablet Take 1 tablet (10 mg) by mouth at bedtime. 90 tablet 1         Review of Systems  CONSTITUTIONAL: NEGATIVE for fever, chills,   RESP: NEGATIVE for significant cough or SOB  CV: NEGATIVE for chest pain, palpitations or peripheral edema  NEURO: NEGATIVE for weakness, dizziness or paresthesias        Objective    /66   Pulse 93   Temp 97.2  F (36.2  C) (Tympanic)   Resp 13   Ht 1.518 m (4' 11.75\")   Wt 59.2 kg (130 lb 8 oz)   SpO2 97%   BMI 25.70 kg/m    Body mass index is 25.7 kg/m .  Physical Exam   GENERAL: alert and no " distress  EYES: Eyes grossly normal to inspection, PERRL and conjunctivae and sclerae normal  HENT: ear canals and TM's normal, nose and mouth without ulcers or lesions  RESP: lungs clear to auscultation - no rales, rhonchi or wheezes  CV: regular rate and rhythm, normal S1 S2,    PSYCH: mentation appears normal, affect normal/bright          Signed Electronically by: Monica Sultana MD

## 2024-10-31 RX ORDER — DIPHENHYDRAMINE HYDROCHLORIDE 50 MG/ML
25 INJECTION INTRAMUSCULAR; INTRAVENOUS
Start: 2024-10-31

## 2024-10-31 RX ORDER — ALBUTEROL SULFATE 0.83 MG/ML
2.5 SOLUTION RESPIRATORY (INHALATION)
OUTPATIENT
Start: 2024-10-31

## 2024-10-31 RX ORDER — METHYLPREDNISOLONE SODIUM SUCCINATE 40 MG/ML
40 INJECTION INTRAMUSCULAR; INTRAVENOUS
Start: 2024-10-31

## 2024-10-31 RX ORDER — DIPHENHYDRAMINE HYDROCHLORIDE 50 MG/ML
50 INJECTION INTRAMUSCULAR; INTRAVENOUS
Start: 2024-10-31

## 2024-10-31 RX ORDER — EPINEPHRINE 1 MG/ML
0.3 INJECTION, SOLUTION INTRAMUSCULAR; SUBCUTANEOUS EVERY 5 MIN PRN
OUTPATIENT
Start: 2024-10-31

## 2024-10-31 RX ORDER — ALBUTEROL SULFATE 90 UG/1
1-2 INHALANT RESPIRATORY (INHALATION)
Start: 2024-10-31

## 2024-10-31 RX ORDER — MEPERIDINE HYDROCHLORIDE 25 MG/ML
25 INJECTION INTRAMUSCULAR; INTRAVENOUS; SUBCUTANEOUS
OUTPATIENT
Start: 2024-10-31

## 2024-11-18 ENCOUNTER — MEDICAL CORRESPONDENCE (OUTPATIENT)
Dept: HEALTH INFORMATION MANAGEMENT | Facility: CLINIC | Age: 84
End: 2024-11-18
Payer: COMMERCIAL

## 2024-11-18 DIAGNOSIS — M81.0 SENILE OSTEOPOROSIS: Primary | ICD-10-CM

## 2024-11-18 RX ORDER — EPINEPHRINE 1 MG/ML
0.3 INJECTION, SOLUTION INTRAMUSCULAR; SUBCUTANEOUS EVERY 5 MIN PRN
Status: CANCELLED | OUTPATIENT
Start: 2024-11-21

## 2024-11-18 RX ORDER — ALBUTEROL SULFATE 0.83 MG/ML
2.5 SOLUTION RESPIRATORY (INHALATION)
Status: CANCELLED | OUTPATIENT
Start: 2024-11-21

## 2024-11-18 RX ORDER — DIPHENHYDRAMINE HYDROCHLORIDE 50 MG/ML
25 INJECTION INTRAMUSCULAR; INTRAVENOUS
Status: CANCELLED
Start: 2024-11-21

## 2024-11-18 RX ORDER — HEPARIN SODIUM (PORCINE) LOCK FLUSH IV SOLN 100 UNIT/ML 100 UNIT/ML
5 SOLUTION INTRAVENOUS
Status: CANCELLED | OUTPATIENT
Start: 2024-11-21

## 2024-11-18 RX ORDER — ZOLEDRONIC ACID 0.05 MG/ML
5 INJECTION, SOLUTION INTRAVENOUS ONCE
Status: CANCELLED
Start: 2024-11-21

## 2024-11-18 RX ORDER — MEPERIDINE HYDROCHLORIDE 25 MG/ML
25 INJECTION INTRAMUSCULAR; INTRAVENOUS; SUBCUTANEOUS
Status: CANCELLED | OUTPATIENT
Start: 2024-11-21

## 2024-11-18 RX ORDER — DIPHENHYDRAMINE HYDROCHLORIDE 50 MG/ML
50 INJECTION INTRAMUSCULAR; INTRAVENOUS
Status: CANCELLED
Start: 2024-11-21

## 2024-11-18 RX ORDER — ALBUTEROL SULFATE 90 UG/1
1-2 INHALANT RESPIRATORY (INHALATION)
Status: CANCELLED
Start: 2024-11-21

## 2024-11-18 RX ORDER — HEPARIN SODIUM,PORCINE 10 UNIT/ML
5-20 VIAL (ML) INTRAVENOUS DAILY PRN
Status: CANCELLED | OUTPATIENT
Start: 2024-11-21

## 2024-11-18 RX ORDER — METHYLPREDNISOLONE SODIUM SUCCINATE 40 MG/ML
40 INJECTION INTRAMUSCULAR; INTRAVENOUS
Status: CANCELLED
Start: 2024-11-21

## 2024-11-21 ENCOUNTER — INFUSION THERAPY VISIT (OUTPATIENT)
Dept: INFUSION THERAPY | Facility: CLINIC | Age: 84
End: 2024-11-21
Attending: INTERNAL MEDICINE
Payer: COMMERCIAL

## 2024-11-21 VITALS
WEIGHT: 136 LBS | SYSTOLIC BLOOD PRESSURE: 136 MMHG | DIASTOLIC BLOOD PRESSURE: 72 MMHG | HEART RATE: 81 BPM | OXYGEN SATURATION: 98 % | TEMPERATURE: 97.8 F | BODY MASS INDEX: 26.78 KG/M2 | RESPIRATION RATE: 18 BRPM

## 2024-11-21 DIAGNOSIS — M81.0 SENILE OSTEOPOROSIS: Primary | ICD-10-CM

## 2024-11-21 PROCEDURE — 250N000011 HC RX IP 250 OP 636: Performed by: INTERNAL MEDICINE

## 2024-11-21 RX ORDER — EPINEPHRINE 1 MG/ML
0.3 INJECTION, SOLUTION INTRAMUSCULAR; SUBCUTANEOUS EVERY 5 MIN PRN
OUTPATIENT
Start: 2025-11-21

## 2024-11-21 RX ORDER — ALBUTEROL SULFATE 0.83 MG/ML
2.5 SOLUTION RESPIRATORY (INHALATION)
OUTPATIENT
Start: 2025-11-21

## 2024-11-21 RX ORDER — ALBUTEROL SULFATE 90 UG/1
1-2 INHALANT RESPIRATORY (INHALATION)
Start: 2025-11-21

## 2024-11-21 RX ORDER — HEPARIN SODIUM (PORCINE) LOCK FLUSH IV SOLN 100 UNIT/ML 100 UNIT/ML
5 SOLUTION INTRAVENOUS
OUTPATIENT
Start: 2025-11-21

## 2024-11-21 RX ORDER — HEPARIN SODIUM,PORCINE 10 UNIT/ML
5-20 VIAL (ML) INTRAVENOUS DAILY PRN
OUTPATIENT
Start: 2025-11-21

## 2024-11-21 RX ORDER — ZOLEDRONIC ACID 0.05 MG/ML
5 INJECTION, SOLUTION INTRAVENOUS ONCE
Status: COMPLETED | OUTPATIENT
Start: 2024-11-21 | End: 2024-11-21

## 2024-11-21 RX ORDER — MEPERIDINE HYDROCHLORIDE 25 MG/ML
25 INJECTION INTRAMUSCULAR; INTRAVENOUS; SUBCUTANEOUS
OUTPATIENT
Start: 2025-11-21

## 2024-11-21 RX ORDER — METHYLPREDNISOLONE SODIUM SUCCINATE 40 MG/ML
40 INJECTION INTRAMUSCULAR; INTRAVENOUS
Start: 2025-11-21

## 2024-11-21 RX ORDER — ZOLEDRONIC ACID 0.05 MG/ML
5 INJECTION, SOLUTION INTRAVENOUS ONCE
Status: CANCELLED
Start: 2025-11-21

## 2024-11-21 RX ORDER — DIPHENHYDRAMINE HYDROCHLORIDE 50 MG/ML
25 INJECTION INTRAMUSCULAR; INTRAVENOUS
Start: 2025-11-21

## 2024-11-21 RX ORDER — DIPHENHYDRAMINE HYDROCHLORIDE 50 MG/ML
50 INJECTION INTRAMUSCULAR; INTRAVENOUS
Start: 2025-11-21

## 2024-11-21 RX ADMIN — ZOLEDRONIC ACID 5 MG: 0.05 INJECTION, SOLUTION INTRAVENOUS at 12:41

## 2024-11-21 ASSESSMENT — PAIN SCALES - GENERAL: PAINLEVEL_OUTOF10: NO PAIN (0)

## 2024-11-21 NOTE — PROGRESS NOTES
Infusion Nursing Note:  Sophia Olivarez presents today for Reclast.    Patient seen by provider today: No   present during visit today: Not Applicable.    Note: Patient reports to tolerating Reclast infusions well in the past.  Denies any new concerns.  Does report to taking Vitamin D and Calcium supplements as suggested.      Intravenous Access:  Peripheral IV placed.    Treatment Conditions:  Lab Results   Component Value Date    HGB 13.0 07/29/2024    WBC 4.6 07/24/2023    ANEU 3.3 07/09/2020     07/24/2023        Lab Results   Component Value Date     10/09/2024    POTASSIUM 4.2 10/09/2024    MAG 2.2 08/14/2017    CR 0.57 10/09/2024    ELISA 9.7 10/09/2024    BILITOTAL 0.6 07/29/2024    ALBUMIN 4.5 07/29/2024    ALT 18 07/29/2024    AST 25 07/29/2024       Results reviewed, labs MET treatment parameters, ok to proceed with treatment.      Post Infusion Assessment:  Patient tolerated infusion without incident.  Blood return noted pre and post infusion.  Site patent and intact, free from redness, edema or discomfort.  No evidence of extravasations.  Access discontinued per protocol.       Discharge Plan:   Patient declined prescription refills.  Discharge instructions reviewed with: Patient.  Patient and/or family verbalized understanding of discharge instructions and all questions answered.  AVS to patient via [employer].  Patient will return in one year for her next infusion.   Patient discharged in stable condition accompanied by: self.  Departure Mode: Ambulatory.      Allison Tucker RN

## 2024-12-21 ENCOUNTER — OFFICE VISIT (OUTPATIENT)
Dept: URGENT CARE | Facility: URGENT CARE | Age: 84
End: 2024-12-21
Payer: COMMERCIAL

## 2024-12-21 VITALS
HEART RATE: 101 BPM | OXYGEN SATURATION: 97 % | DIASTOLIC BLOOD PRESSURE: 81 MMHG | SYSTOLIC BLOOD PRESSURE: 136 MMHG | TEMPERATURE: 98.2 F | RESPIRATION RATE: 16 BRPM

## 2024-12-21 DIAGNOSIS — U07.1 INFECTION DUE TO 2019 NOVEL CORONAVIRUS: Primary | ICD-10-CM

## 2024-12-21 PROCEDURE — 99214 OFFICE O/P EST MOD 30 MIN: CPT | Performed by: PHYSICIAN ASSISTANT

## 2024-12-21 NOTE — PATIENT INSTRUCTIONS
Patient was educated on the natural course of viral illness which typically lasts up to 10 days.  Conservative measures discussed including increased fluids, nasal saline irrigation (neti pot), warm steamy shower, salt water gargles, cough suppressants, expectorants (Mucinex), and analgesics (Tylenol and/or Ibuprofen). Stop Simvastatin for 5 days. See your primary care provider if symptoms worsen or do not improve in 7 days. Seek emergency care if you develop fever over 104 or shortness of breath.

## 2024-12-21 NOTE — PROGRESS NOTES
URGENT CARE VISIT:    SUBJECTIVE:   Sophia Olivarez is a 84 year old female presenting with a chief complaint of stuffy nose and cough - productive.  Onset was 3 day(s) ago.   She denies the following symptoms: shortness of breath  Course of illness is same.    Treatment measures tried include none with no relief of symptoms.  Predisposing factors include  tested positive for covid.     PMH:   Past Medical History:   Diagnosis Date    Benign neoplasm of skin, site unspecified     sees derm.    Calculus of kidney     3 passed spont.    Chronic subinvolution of uterus     Disorder of bone and cartilage, unspecified     osteopenia    Dyspepsia and other specified disorders of function of stomach     dyspepsia    Generalized osteoarthrosis, unspecified site     H/O thyroid nodule     f/u with endocrinologist    Hematuria     microscopic    HTN (hypertension)     Malignant neoplasm of corpus uteri, except isthmus (H)     surgery and radiation rx.stage IC, grade 3 endometrial carcinoma.    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     Nontoxic multinodular goiter     sees endocrine    Other and unspecified hyperlipidemia      Allergies: Aspirin, Atorvastatin calcium, Codeine sulfate, Epidural tray, Fentanyl, Magnesium citrate, Meperidine hcl, Questran [cholestyramine], and Tilactase   Medications:   Current Outpatient Medications   Medication Sig Dispense Refill    amLODIPine (NORVASC) 5 MG tablet Take 1 tablet (5 mg) by mouth daily. 90 tablet 1    Ascorbic Acid (VITAMIN C CR) 1000 MG TBCR       Cholecalciferol (VITAMIN D3 PO) Take 2,000 Units by mouth daily.      fluticasone (FLONASE) 50 MCG/ACT nasal spray INSTILL 2 SPRAYS INTO BOTH NOSTRILS DAILY 16 g 3    Multiple Vitamins-Minerals (MULTIVITAMIN & MINERAL PO) Take 1 tablet by mouth daily.      nirmatrelvir and ritonavir (PAXLOVID) 300 mg/100 mg therapy pack Take 3  tablets by mouth 2 times daily for 5 days. 30 tablet 0    omega-3 fatty acids 1200 MG capsule Take 1 capsule by mouth daily 90 capsule     saccharomyces boulardii (FLORASTOR) 250 MG capsule Take 250 mg by mouth 2 times daily      simvastatin (ZOCOR) 10 MG tablet Take 1 tablet (10 mg) by mouth at bedtime. 90 tablet 1     Social History:   Social History     Tobacco Use    Smoking status: Never    Smokeless tobacco: Never   Substance Use Topics    Alcohol use: No       ROS:  General: negative  Skin: negative  Eyes: negative  Ears/Nose/Throat: nasal congestion  Respiratory: Cough  Cardiovascular: negative  Gastrointestinal: negative  Genitourinary: negative  Musculoskeletal: negative  Neurologic: negative      OBJECTIVE:  /81 (BP Location: Left arm, Patient Position: Sitting, Cuff Size: Adult Regular)   Pulse 101   Temp 98.2  F (36.8  C) (Oral)   Resp 16   SpO2 97%   GENERAL APPEARANCE: healthy, alert and no distress  EYES: EOMI,  PERRL, conjunctiva clear  HENT: ear canals and TM's normal.  Nose and mouth without ulcers, erythema or lesions  NECK: supple, nontender, no lymphadenopathy  RESP: lungs clear to auscultation - no rales, rhonchi or wheezes  CV: regular rates and rhythm, normal S1 S2, no murmur noted  SKIN: no suspicious lesions or rashes      ASSESSMENT:    ICD-10-CM    1. Infection due to 2019 novel coronavirus  U07.1 nirmatrelvir and ritonavir (PAXLOVID) 300 mg/100 mg therapy pack          PLAN:  Patient Instructions   Patient was educated on the natural course of viral illness which typically lasts up to 10 days.  Conservative measures discussed including increased fluids, nasal saline irrigation (neti pot), warm steamy shower, salt water gargles, cough suppressants, expectorants (Mucinex), and analgesics (Tylenol and/or Ibuprofen). Stop Simvastatin for 5 days. See your primary care provider if symptoms worsen or do not improve in 7 days. Seek emergency care if you develop fever over 104 or  shortness of breath.    Patient verbalized understanding and is agreeable to plan. The patient was discharged ambulatory and in stable condition.    Claudette Hollingsworth PA-C ....................  12/21/2024   9:43 AM

## 2025-01-02 ENCOUNTER — THERAPY VISIT (OUTPATIENT)
Dept: OCCUPATIONAL THERAPY | Facility: CLINIC | Age: 85
End: 2025-01-02
Payer: COMMERCIAL

## 2025-01-02 DIAGNOSIS — I89.0 LYMPHEDEMA: Primary | ICD-10-CM

## 2025-01-02 NOTE — PROGRESS NOTES
DISCHARGE  Reason for Discharge: Patient has met all goals.    Equipment Issued: none    Discharge Plan: Patient to continue home program.    Referring Provider:  Monica Sultana    01/02/25 0500   Appointment Info   Treating Provider Dwayne Flores, OTR/TEE, KENYETTA   Total/Authorized Visits 50 hard max for a year, [employer] fed employee program   Visits Used 1/50- 2025   Medical Diagnosis lymphedema   OT Tx Diagnosis lymphedema BLE   Progress Note/Certification   Onset of Illness/Injury or Date of Surgery 07/29/24   Therapy Frequency 1x/month   Predicted Duration 3 months   OT Goal 1   Goal Identifier home program   Goal Description For long-term home mgmt chronic lymphedema pt/caregiver independent in home program a. GCB/GCB alternative garment for night wear b. compression garment for day wear c. ex to incr lymph flow/self-MLD.   Rationale In order to maximize safety and independence with performance of self-care activities;In order to maximize safety and independence with ADL/IADLs;In order to maximize safety and independence with functional transfers and functional mobility within the home or community   Goal Progress goal met   Target Date 10/29/24   Date Met 01/02/25   OT Goal 2   Goal Identifier volume   Goal Description For decreased risk of infection, skin breakdown/wounds & progressive soft-tissue fibrosis and improved fit of footwear, volume will be reduced by at least 200 mL in RLE.   Rationale In order to maximize safety and independence with performance of self-care activities;In order to maximize safety and independence with ADL/IADLs;In order to maximize safety and independence with functional transfers and functional mobility within the home or community   Target Date 10/29/24   Goal Progress goal met, exceeded   OT Goal 3   Goal Identifier lymphedema precautions   Goal Description Pt will independently verbalize the signs/symptoms of lymphedema, precautions and how to obtain a future  "lymphedema referral if edema persists to preserve skin integrity, prevent infection and preserve functional mobility.   Rationale In order to maximize safety and independence with performance of self-care activities;In order to maximize safety and independence with ADL/IADLs;In order to maximize safety and independence with functional transfers and functional mobility within the home or community   Target Date 10/29/24   Goal Progress goal met   Subjective Report   Subjective Report \"I just want to check in, make sure I'm going in the right direction\"   Objective Measure 1   Objective Measure BLE volume   Details see attached flowsheet:  vs. 8/30/24 measurements, RLE has reduced 914 mL (16%), LLE has reduced 533 mL (11%)   Self Care/Home Management   Self-Care/Home Mgmt/ADL, Compensatory, Meal Prep Minutes (32190) 40 Minutes   Self Care 1 - Details Patient presented wearing R thigh-high and LBK compression socks, patient had donned RBK Velcro-strap binder over compression and stated this felt comfortable; no signs of redness/skin irritation.  Patient states she cont to use lymphatic pump daily, does not wear compression at night.  Ed re: importance of night-time compression, use of compression socks during the day (patient has acquired full RLE Velcro-strap garment component system); BLE measurements taken with excellent reduction in volume.  Word doc created for patient detailing all components of HP, wash/wear/replacement/acquisition of garments, s/s of infection, skin care precautions.  Patient cont to perform HEP/walk daily.   Skilled Intervention BLE measurements, garment and HP ed   Patient Response/Progress all goals met, patient feels confident re: HP, verb understanding re: ed provided, handout created/issued, d/c this visit   Education   Learner/Method Patient   Education Comments needs reinforcement of ed provided   Plan   Home program daytime RLE 20-30 mmHg thigh-high, LBK compression sock; velcro-strap " compression garments for night-time; daily walking, HEP, use of lymphatic pump   Updates to plan of care discharge this visit, all goals met

## 2025-01-20 ENCOUNTER — APPOINTMENT (OUTPATIENT)
Dept: URBAN - METROPOLITAN AREA CLINIC 253 | Age: 85
Setting detail: DERMATOLOGY
End: 2025-01-20

## 2025-01-20 VITALS — RESPIRATION RATE: 14 BRPM | HEIGHT: 61.5 IN | WEIGHT: 130 LBS

## 2025-01-20 DIAGNOSIS — D22 MELANOCYTIC NEVI: ICD-10-CM

## 2025-01-20 DIAGNOSIS — D18.0 HEMANGIOMA: ICD-10-CM

## 2025-01-20 DIAGNOSIS — Z71.89 OTHER SPECIFIED COUNSELING: ICD-10-CM

## 2025-01-20 DIAGNOSIS — L82.1 OTHER SEBORRHEIC KERATOSIS: ICD-10-CM

## 2025-01-20 DIAGNOSIS — L81.4 OTHER MELANIN HYPERPIGMENTATION: ICD-10-CM

## 2025-01-20 DIAGNOSIS — I89.0 LYMPHEDEMA, NOT ELSEWHERE CLASSIFIED: ICD-10-CM

## 2025-01-20 PROBLEM — D22.5 MELANOCYTIC NEVI OF TRUNK: Status: ACTIVE | Noted: 2025-01-20

## 2025-01-20 PROBLEM — D18.01 HEMANGIOMA OF SKIN AND SUBCUTANEOUS TISSUE: Status: ACTIVE | Noted: 2025-01-20

## 2025-01-20 PROCEDURE — 99213 OFFICE O/P EST LOW 20 MIN: CPT

## 2025-01-20 PROCEDURE — OTHER MIPS QUALITY: OTHER

## 2025-01-20 PROCEDURE — OTHER COUNSELING: OTHER

## 2025-01-20 ASSESSMENT — LOCATION DETAILED DESCRIPTION DERM
LOCATION DETAILED: RIGHT PROXIMAL PRETIBIAL REGION
LOCATION DETAILED: SUPERIOR LUMBAR SPINE
LOCATION DETAILED: INFERIOR THORACIC SPINE

## 2025-01-20 ASSESSMENT — LOCATION ZONE DERM
LOCATION ZONE: LEG
LOCATION ZONE: TRUNK

## 2025-01-20 ASSESSMENT — LOCATION SIMPLE DESCRIPTION DERM
LOCATION SIMPLE: RIGHT PRETIBIAL REGION
LOCATION SIMPLE: UPPER BACK
LOCATION SIMPLE: LOWER BACK

## 2025-01-20 NOTE — HPI: FULL BODY SKIN EXAMINATION
What Type Of Note Output Would You Prefer (Optional)?: Standard Output
What Is The Reason For Today's Visit?: Full Body Skin Examination
What Is The Reason For Today's Visit? (Being Monitored For X): concerning skin lesions on a periodic basis
Additional History: Patient reports use of SPF 30 daily. She does not have a history with tanning beds nor blistering sunburns. Patient has no particular spots of concern today,

## 2025-01-20 NOTE — PROCEDURE: MIPS QUALITY
Yaquelin Prescott  [address] [facility] [address]    Your procedure is on 12/1/21 Anticipated Arrival Time 10:00 AM  Location: [facility] [address]  Procedure Name: Intralaminar Epidural Steroid Injection, Lumbar, L5/S1 x2    The time listed above is an approximate time. Our pre-operative department will call you prior to your procedure to confirm your arrival time.    You must have an adult  to take you home and stay with you after your procedure. If you do not have a  your procedure will be cancelled. This is a facility policy. If you choose to use public transportation and do not have a responsible adult to accompany you, your procedure will be cancelled.     If within the 7 days before your scheduled procedure and you have a fever, infection of any kind, or are treated with antibiotics, please call our office immediately as you may need to have your procedure rescheduled.     Yaquelin is scheduled with local anesthetic. Patient is educated that it is not necessary to withhold from eating or drinking the day of their procedure.    You do not have any medications that need to be stopped for this procedure.    Medication Name and Days to Hold:    Stated no longer takes anything listed    Do you have any history of bleeding or clotting disorders? No  Do you have any of the following? NA    If you have any insurance changes prior to your scheduled procedure, please contact our office.    Please notify the office if you start any new medications before your scheduled procedure.    If you have had any dose of the COVID vaccine in the past two weeks, or planning on getting a dosage within the next 2 weeks,  please call our office immediately as you may need to have your procedure rescheduled.    Please contact your insurance to verify benefits/coverage. Please note that an authorization/pre-approval obtained by our team is not a guarantee of payment. Any additional 
questions, financial or billing, you can call our Patient Contact Center at [phone].     According to the Missed Appointment and Late Cancellation Policy, if you are unable to keep a scheduled appointment, you must call the Pain Management Department in the clinic at least 48 hours before your scheduled appointment to cancel or reschedule. If you have any questions regarding these instructions, please call [facility] at [phone].          
Quality 431: Preventive Care And Screening: Unhealthy Alcohol Use - Screening: Patient not identified as an unhealthy alcohol user when screened for unhealthy alcohol use using a systematic screening method
Quality 226: Preventive Care And Screening: Tobacco Use: Screening And Cessation Intervention: Patient screened for tobacco use and is an ex/non-smoker
Detail Level: Detailed
Quality 130: Documentation Of Current Medications In The Medical Record: Current Medications Documented

## 2025-01-23 ENCOUNTER — TELEPHONE (OUTPATIENT)
Dept: INTERNAL MEDICINE | Facility: CLINIC | Age: 85
End: 2025-01-23
Payer: COMMERCIAL

## 2025-01-23 NOTE — TELEPHONE ENCOUNTER
Forms/Letter Request    Type of form/letter: DME (wheelchair, hospital bed)    Type of DME requested: Thigh High Compression Stockings 20/30mg    Do we have the form/letter: Yes: placed in provider mailbox for signature    Who is the form from? [employer]  (if other please explain)    Where did/will the form come from? form was faxed in    When is form/letter needed by: 5-7    How would you like the form/letter returned: Fax : [phone]    Patient Notified form requests are processed in 5-7 business days:Yes    Could we send this information to you in [employer] or would you prefer to receive a phone call?:   NA

## 2025-01-27 ENCOUNTER — MEDICAL CORRESPONDENCE (OUTPATIENT)
Dept: HEALTH INFORMATION MANAGEMENT | Facility: CLINIC | Age: 85
End: 2025-01-27

## 2025-02-06 NOTE — PROGRESS NOTES
Called and spoke with patient about Stat doppler.  [facility] [address] [place].  Patient confirmed appointment and agreed to the time, date, and location.   [facility] Office Note    Sophia Olivarez returns today for duplex ultrasound of her lower extremity veins.  She presented because of enlarging left leg varicose veins, right posterior thigh pain and bilateral, ongoing lower extremity swelling.  Please see my consultation of 7/25/2018 for details.    Duplex ultrasound of her right lower extremity veins reveals no evidence of deep vein thrombosis.  Her right common femoral vein is incompetent but the remaining deep vein valves are competent.  The right great saphenous vein, anterior accessory saphenous vein and Vein of Giacomini are competent.  There is limited incompetence in the distal right small saphenous vein.  There are no incompetent perforators appreciated.    In the left lower extremity there is no evidence of deep vein thrombosis or deep vein valve incompetence.  The left great saphenous vein is incompetent in the proximal to mid thigh but otherwise is competent.  The left small saphenous vein is incompetent at the saphenopopliteal junction and in the distal calf.  The left anterior accessory saphenous vein and Vein of Giacomini are competent.  The large tributary coursing down the left posterior calf originates from the small saphenous vein.    Impression  CEAP 3 chronic venous insufficiency left lower extremity.  I do not see significant superficial or deep venous insufficiency to explain the swelling in the right leg.  She is relatively asymptomatic at this time and wishes to pursue continued observation of the veins.  This is not unreasonable given her paucity of symptoms.    She will continue graded compression hose, leg elevation, exercise, dietary measures and weight control and will return to see me in the future if her symptoms worsen.    NADIA Velasquez MD    Please send a copy of this dictation to Dr. Lefty Sultana

## 2025-04-15 ENCOUNTER — TELEPHONE (OUTPATIENT)
Dept: INTERNAL MEDICINE | Facility: CLINIC | Age: 85
End: 2025-04-15
Payer: COMMERCIAL

## 2025-04-15 DIAGNOSIS — E78.2 MIXED HYPERLIPIDEMIA: ICD-10-CM

## 2025-04-17 RX ORDER — SIMVASTATIN 10 MG
10 TABLET ORAL AT BEDTIME
Qty: 30 TABLET | Refills: 0 | Status: SHIPPED | OUTPATIENT
Start: 2025-04-17

## 2025-04-17 NOTE — TELEPHONE ENCOUNTER
Pt is over due for lipids labs and also due for 6 month office visit. . Med refilled for 1 month only until labs. Pl advise pt.  Okay to schedule on any of my same-day or approval only slot

## 2025-04-18 NOTE — TELEPHONE ENCOUNTER
Call #1: phone number on file could not connect. No concent to communicate on file. Sent mychart message with MD notes/needs.

## 2025-04-22 NOTE — TELEPHONE ENCOUNTER
Called and spoke with patient. A lab appointment was made for 4- and patient was informed it is fasting

## 2025-04-29 ENCOUNTER — LAB (OUTPATIENT)
Dept: LAB | Facility: CLINIC | Age: 85
End: 2025-04-29
Payer: COMMERCIAL

## 2025-04-29 DIAGNOSIS — E78.2 MIXED HYPERLIPIDEMIA: ICD-10-CM

## 2025-04-29 PROCEDURE — 36415 COLL VENOUS BLD VENIPUNCTURE: CPT

## 2025-04-29 PROCEDURE — 80061 LIPID PANEL: CPT

## 2025-04-29 PROCEDURE — 80053 COMPREHEN METABOLIC PANEL: CPT

## 2025-04-30 LAB
ALBUMIN SERPL BCG-MCNC: 4.2 G/DL (ref 3.5–5.2)
ALP SERPL-CCNC: 76 U/L (ref 40–150)
ALT SERPL W P-5'-P-CCNC: 18 U/L (ref 0–50)
ANION GAP SERPL CALCULATED.3IONS-SCNC: 7 MMOL/L (ref 7–15)
AST SERPL W P-5'-P-CCNC: 24 U/L (ref 0–45)
BILIRUB SERPL-MCNC: 0.5 MG/DL
BUN SERPL-MCNC: 9.6 MG/DL (ref 8–23)
CALCIUM SERPL-MCNC: 9.3 MG/DL (ref 8.8–10.4)
CHLORIDE SERPL-SCNC: 103 MMOL/L (ref 98–107)
CHOLEST SERPL-MCNC: 174 MG/DL
CREAT SERPL-MCNC: 0.55 MG/DL (ref 0.51–0.95)
EGFRCR SERPLBLD CKD-EPI 2021: >90 ML/MIN/1.73M2
FASTING STATUS PATIENT QL REPORTED: YES
FASTING STATUS PATIENT QL REPORTED: YES
GLUCOSE SERPL-MCNC: 97 MG/DL (ref 70–99)
HCO3 SERPL-SCNC: 31 MMOL/L (ref 22–29)
HDLC SERPL-MCNC: 69 MG/DL
LDLC SERPL CALC-MCNC: 77 MG/DL
NONHDLC SERPL-MCNC: 105 MG/DL
POTASSIUM SERPL-SCNC: 4.1 MMOL/L (ref 3.4–5.3)
PROT SERPL-MCNC: 6.9 G/DL (ref 6.4–8.3)
SODIUM SERPL-SCNC: 141 MMOL/L (ref 135–145)
TRIGL SERPL-MCNC: 140 MG/DL

## 2025-05-01 SDOH — HEALTH STABILITY: PHYSICAL HEALTH: ON AVERAGE, HOW MANY MINUTES DO YOU ENGAGE IN EXERCISE AT THIS LEVEL?: 50 MIN

## 2025-05-01 SDOH — HEALTH STABILITY: PHYSICAL HEALTH: ON AVERAGE, HOW MANY DAYS PER WEEK DO YOU ENGAGE IN MODERATE TO STRENUOUS EXERCISE (LIKE A BRISK WALK)?: 6 DAYS

## 2025-05-01 ASSESSMENT — SOCIAL DETERMINANTS OF HEALTH (SDOH): HOW OFTEN DO YOU GET TOGETHER WITH FRIENDS OR RELATIVES?: TWICE A WEEK

## 2025-05-06 ENCOUNTER — OFFICE VISIT (OUTPATIENT)
Dept: INTERNAL MEDICINE | Facility: CLINIC | Age: 85
End: 2025-05-06
Payer: COMMERCIAL

## 2025-05-06 VITALS
SYSTOLIC BLOOD PRESSURE: 124 MMHG | BODY MASS INDEX: 25.78 KG/M2 | OXYGEN SATURATION: 98 % | HEART RATE: 92 BPM | WEIGHT: 131.3 LBS | RESPIRATION RATE: 13 BRPM | DIASTOLIC BLOOD PRESSURE: 74 MMHG | TEMPERATURE: 96.3 F | HEIGHT: 60 IN

## 2025-05-06 DIAGNOSIS — M81.0 AGE RELATED OSTEOPOROSIS, UNSPECIFIED PATHOLOGICAL FRACTURE PRESENCE: ICD-10-CM

## 2025-05-06 DIAGNOSIS — E78.2 MIXED HYPERLIPIDEMIA: ICD-10-CM

## 2025-05-06 DIAGNOSIS — I10 ESSENTIAL HYPERTENSION: ICD-10-CM

## 2025-05-06 DIAGNOSIS — E04.2 MULTIPLE THYROID NODULES: Primary | ICD-10-CM

## 2025-05-06 DIAGNOSIS — Z00.00 ENCOUNTER FOR MEDICARE ANNUAL WELLNESS EXAM: ICD-10-CM

## 2025-05-06 DIAGNOSIS — I89.0 LYMPHEDEMA: ICD-10-CM

## 2025-05-06 DIAGNOSIS — C55 MALIGNANT NEOPLASM OF UTERUS, UNSPECIFIED SITE (H): ICD-10-CM

## 2025-05-06 PROCEDURE — 99214 OFFICE O/P EST MOD 30 MIN: CPT | Mod: 25 | Performed by: INTERNAL MEDICINE

## 2025-05-06 PROCEDURE — G2211 COMPLEX E/M VISIT ADD ON: HCPCS | Performed by: INTERNAL MEDICINE

## 2025-05-06 PROCEDURE — 3074F SYST BP LT 130 MM HG: CPT | Performed by: INTERNAL MEDICINE

## 2025-05-06 PROCEDURE — 3078F DIAST BP <80 MM HG: CPT | Performed by: INTERNAL MEDICINE

## 2025-05-06 PROCEDURE — 99397 PER PM REEVAL EST PAT 65+ YR: CPT | Performed by: INTERNAL MEDICINE

## 2025-05-06 RX ORDER — SIMVASTATIN 10 MG
10 TABLET ORAL AT BEDTIME
Qty: 90 TABLET | Refills: 1 | Status: SHIPPED | OUTPATIENT
Start: 2025-05-06

## 2025-05-06 RX ORDER — AMLODIPINE BESYLATE 5 MG/1
5 TABLET ORAL DAILY
Qty: 90 TABLET | Refills: 1 | Status: SHIPPED | OUTPATIENT
Start: 2025-05-06

## 2025-05-06 SDOH — HEALTH STABILITY: PHYSICAL HEALTH: ON AVERAGE, HOW MANY DAYS PER WEEK DO YOU ENGAGE IN MODERATE TO STRENUOUS EXERCISE (LIKE A BRISK WALK)?: 6 DAYS

## 2025-05-06 SDOH — HEALTH STABILITY: PHYSICAL HEALTH: ON AVERAGE, HOW MANY MINUTES DO YOU ENGAGE IN EXERCISE AT THIS LEVEL?: 50 MIN

## 2025-05-06 ASSESSMENT — SOCIAL DETERMINANTS OF HEALTH (SDOH): HOW OFTEN DO YOU GET TOGETHER WITH FRIENDS OR RELATIVES?: TWICE A WEEK

## 2025-05-06 NOTE — PROGRESS NOTES
"Preventive Care Visit  [facility]  Monica Sultana MD, Internal Medicine  May 6, 2025      Assessment & Plan     (Z00.00) Encounter for Medicare annual wellness exam  Plan: All lab results reviewed with patient, up-to-date on mammogram, DEXA, reviewed immunizations with patient      (I10) Essential hypertension  Comment: BP stable   Plan: amLODIPine (NORVASC) 5 MG tablet refilled.explained clearly about the medication,insructions and side effects.  Advised to follow low salt diet and exercise and f/u in 6 mths.             (E78.2) Mixed hyperlipidemia  Plan: lipids reviewed, refilled simvastatin (ZOCOR) 10 MG tablet as directed.explained clearly about the medication,insructions and side effects.            (I89.0) Lymphedema  Plan: on lymphedema wraps and follows up with lymphedema specialist      (M81.0) Age related osteoporosis, unspecified pathological fracture presence  Plan: on yearly Reclast through endocrinologist , last dexa 09/23       (E04.2) Multiple thyroid nodules    Plan: follows up with endocrinologist        (C55) Malignant neoplasm of uterus, unspecified site (H)  Plan: S/p hysterectomy with lower extremity lymphedema       Patient has been advised of split billing requirements and indicates understanding: Yes      BMI  Estimated body mass index is 25.86 kg/m  as calculated from the following:    Height as of this encounter: 1.518 m (4' 11.75\").    Weight as of this encounter: 59.6 kg (131 lb 4.8 oz).       Counseling  Appropriate preventive services were addressed with this patient via screening, questionnaire, or discussion as appropriate for fall prevention, nutrition, physical activity, Tobacco-use cessation, social engagement, weight loss and cognition.  Checklist reviewing preventive services available has been given to the patient.  Reviewed patient's diet, addressing concerns and/or questions.   She is at risk for psychosocial distress and has been " provided with information to reduce risk.   The patient was provided with written information regarding signs of hearing loss.       Follow-up 6 months      The longitudinal plan of care for the diagnosis(es)/condition(s) as documented were addressed during this visit. Due to the added complexity in care, I will continue to support Vashti in the subsequent management and with ongoing continuity of care.        Subjective   Vashti is a 84 year old, presenting for the following:  Physical (Labs completed)        5/6/2025     8:03 AM   Additional Questions   Roomed by kelly   Accompanied by self         5/6/2025     8:03 AM   Patient Reported Additional Medications   Patient reports taking the following new medications none           HPI     Hyperlipidemia Follow-Up    Are you regularly taking any medication or supplement to lower your cholesterol?   Yes- simvastatin   Are you having muscle aches or other side effects that you think could be caused by your cholesterol lowering medication?  No    Hypertension Follow-up    Do you check your blood pressure regularly outside of the clinic? Yes   Are you following a low salt diet? Yes  Are your blood pressures ever more than 140 on the top number (systolic) OR more   than 90 on the bottom number (diastolic), for example 140/90? No    BP Readings from Last 2 Encounters:   05/06/25 124/74   12/21/24 136/81       Osteoporosis ; on Reclast once yr through endocrinologist     Rt LE Lymphedema; on wraps      H/o Thyroid nodules; follows up with endocrinologist      Advance Care Planning    Discussed advance care planning with patient; informed AVS has link to [facility].        5/6/2025   General Health   How would you rate your overall physical health? (!) FAIR   Feel stress (tense, anxious, or unable to sleep) To some extent   (!) STRESS CONCERN      5/6/2025   Nutrition   Diet: Regular (no restrictions)         5/6/2025   Exercise   Days per week of moderate/strenous  exercise 6 days   Average minutes spent exercising at this level 50 min         5/6/2025   Social Factors   Frequency of gathering with friends or relatives Twice a week   Worry food won't last until get money to buy more No   Food not last or not have enough money for food? No   Do you have housing? (Housing is defined as stable permanent housing and does not include staying outside in a car, in a tent, in an abandoned building, in an overnight shelter, or couch-surfing.) Yes   Are you worried about losing your housing? No   Lack of transportation? No   Unable to get utilities (heat,electricity)? No         5/6/2025   Fall Risk   Fallen 2 or more times in the past year? No    No    Trouble with walking or balance? No    No        Proxy-reported    Multiple values from one day are sorted in reverse-chronological order          5/6/2025   Activities of Daily Living- Home Safety   Needs help with the following daily activites None of the above   Safety concerns in the home None of the above         5/6/2025   Dental   Dentist two times every year? Yes         5/6/2025   Hearing Screening   Hearing concerns? (!) TROUBLE UNDERSTANDING SPEECH ON THE TELEPHONE    Pt declines hearing evaluation          5/6/2025   Driving Risk Screening   Patient/family members have concerns about driving No         5/6/2025   General Alertness/Fatigue Screening   Have you been more tired than usual lately? No         5/6/2025   Urinary Incontinence Screening   Bothered by leaking urine in past 6 months No         Today's PHQ-2 Score:       5/6/2025     7:56 AM   PHQ-2 ( 1999 [employer])   Q1: Little interest or pleasure in doing things 0   Q2: Feeling down, depressed or hopeless 0   PHQ-2 Score 0    Q1: Little interest or pleasure in doing things Not at all   Q2: Feeling down, depressed or hopeless Not at all   PHQ-2 Score 0       Patient-reported           5/6/2025   Substance Use   Alcohol more than 3/day or more than 7/wk No   Do you have  a current opioid prescription? No   How severe/bad is pain from 1 to 10? 0/10 (No Pain)   Do you use any other substances recreationally? No     Social History     Tobacco Use    Smoking status: Never    Smokeless tobacco: Never   Vaping Use    Vaping status: Never Used   Substance Use Topics    Alcohol use: No    Drug use: No          Mammogram 08/24             Reviewed and updated as needed this visit by Provider                    Past Medical History:   Diagnosis Date    Benign neoplasm of skin, site unspecified     sees derm.    Calculus of kidney     3 passed spont.    Chronic subinvolution of uterus     Disorder of bone and cartilage, unspecified     osteopenia    Dyspepsia and other specified disorders of function of stomach     dyspepsia    Generalized osteoarthrosis, unspecified site     H/O thyroid nodule     f/u with endocrinologist    Hematuria     microscopic    HTN (hypertension)     Malignant neoplasm of corpus uteri, except isthmus (H)     surgery and radiation rx.stage IC, grade 3 endometrial carcinoma.    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     Nontoxic multinodular goiter     sees endocrine    Other and unspecified hyperlipidemia      Past Surgical History:   Procedure Laterality Date    ABDOMEN SURGERY  2005    uterine cancer Hysterectiomy    BIOPSY      many times from nodules from thyroid    CHOLECYSTECTOMY  1992    GYN SURGERY  2005    uterine cancer    HEAD & NECK SURGERY      right thyroid taken out    [email or web address] NONSPECIFIC PROCEDURE  1992    Cholecystectomy. abstracted 6/24/02.    [facility] [[email or web address] NONSPECIFIC PROCEDURE      Thyroidectomy. abstracted 6/24/02.    [facility] [[email or web address] NONSPECIFIC PROCEDURE      Dilatation & Curettage X 2. abstracted 6/24/02.    [facility] [[email or web address] NONSPECIFIC PROCEDURE      Cystoscopy. abstracted 6/24/02.    [facility] [[email or web address] NONSPECIFIC PROCEDURE      hysterctomy for uterine cancer.    [facility] COLONOSCOPY THRU STOMA, DIAGNOSTIC   "10/2007    due q 5 yrs     Current Outpatient Medications   Medication Sig Dispense Refill    amLODIPine (NORVASC) 5 MG tablet Take 1 tablet (5 mg) by mouth daily. 90 tablet 1    Cholecalciferol (VITAMIN D3 PO) Take 2,000 Units by mouth daily.      fluticasone (FLONASE) 50 MCG/ACT nasal spray INSTILL 2 SPRAYS INTO BOTH NOSTRILS DAILY 16 g 3    Multiple Vitamins-Minerals (MULTIVITAMIN & MINERAL PO) Take 1 tablet by mouth daily.      omega-3 fatty acids 1200 MG capsule Take 1 capsule by mouth daily 90 capsule     saccharomyces boulardii (FLORASTOR) 250 MG capsule Take 250 mg by mouth 2 times daily      simvastatin (ZOCOR) 10 MG tablet Take 1 tablet (10 mg) by mouth at bedtime. 90 tablet 1     Current providers sharing in care for this patient include:  Patient Care Team:  Monica Sultana MD as PCP - General  Monica Sultana MD as Assigned PCP  Scott Abel MD as Assigned Heart and Vascular Provider      The following health maintenance items are reviewed in Epic and correct as of today:  Health Maintenance   Topic Date Due    ANNUAL REVIEW OF HM ORDERS  07/20/2023    TSH W/FREE T4 REFLEX  08/04/2024    COVID-19 Vaccine (6 - 2024-25 season) 03/30/2025    MEDICARE ANNUAL WELLNESS VISIT  07/29/2025    BMP  04/29/2026    LIPID  04/29/2026    FALL RISK ASSESSMENT  05/06/2026    ADVANCE CARE PLANNING  07/24/2028    DTAP/TDAP/TD IMMUNIZATION (3 - Td or Tdap) 06/03/2029    DEXA  09/08/2038    PHQ-2 (once per calendar year)  Completed    INFLUENZA VACCINE  Completed    Pneumococcal Vaccine: 50+ Years  Completed    ZOSTER IMMUNIZATION  Completed    RSV VACCINE  Completed    HPV IMMUNIZATION  Aged Out    MENINGITIS IMMUNIZATION  Aged Out    MAMMO SCREENING  Discontinued            Objective    Exam  /74   Pulse 92   Temp (!) 96.3  F (35.7  C) (Tympanic)   Resp 13   Ht 1.518 m (4' 11.75\")   Wt 59.6 kg (131 lb 4.8 oz)   SpO2 98%   BMI 25.86 kg/m     Estimated body mass index is " "25.86 kg/m  as calculated from the following:    Height as of this encounter: 1.518 m (4' 11.75\").    Weight as of this encounter: 59.6 kg (131 lb 4.8 oz).    Physical Exam  GENERAL: alert and no distress  EYES: Eyes grossly normal to inspection, PERRL and conjunctivae and sclerae normal  HENT: ear canals and TM's normal, nose and mouth without ulcers or lesions  RESP: lungs clear to auscultation - no rales, rhonchi or wheezes  BREAST:  pt declined breast exam   CV: regular rate and rhythm, normal S1 S2,    ABDOMEN: soft, nontender,   and bowel sounds normal  MS: Rt LE lymphedema wrapped , no calf tenderness   NEURO: Normal strength and tone, mentation intact and speech normal  PSYCH: mentation appears normal, affect normal/bright        5/6/2025   Mini Cog   Clock Draw Score 2 Normal   3 Item Recall 3 objects recalled   Mini Cog Total Score 5              Signed Electronically by: Monica Sultana MD    "

## 2025-05-06 NOTE — NURSING NOTE
"/74   Pulse 92   Temp (!) 96.3  F (35.7  C) (Tympanic)   Resp 13   Ht 1.518 m (4' 11.75\")   Wt 59.6 kg (131 lb 4.8 oz)   SpO2 98%   BMI 25.86 kg/m      "

## 2025-05-15 ENCOUNTER — TRANSFERRED RECORDS (OUTPATIENT)
Dept: HEALTH INFORMATION MANAGEMENT | Facility: CLINIC | Age: 85
End: 2025-05-15
Payer: COMMERCIAL

## 2025-05-15 LAB
HBA1C MFR BLD: 5.4 %
TSH SERPL-ACNC: 0.78 UIU/ML (ref 0.47–4.68)

## 2025-05-20 NOTE — PROCEDURE: MOHS SURGERY
Swapna is here today for   Chief Complaint   Patient presents with    Office Visit     Ultrasound results    .        Medication Refills needed today?  NO,   if you receive a prescription today would you like it to be sent to [facility]? NO    Medications: medications verified and updated    Patient would like communication of their results via:    Cell Phone:   Telephone Information:   Mobile [phone]     Okay to leave a message containing results? Yes    Tobacco history: verified         Health Maintenance       COVID-19 Vaccine (6 - 2024-25 season)  Overdue since 9/1/2024    Respiratory Syncytial Virus (RSV) Vaccine 60+ (1 - 1-dose 75+ series)  Never done           Following review of the above:  Patient is not proceeding with: COVID-19,RSV    Note: Refer to final orders and clinician documentation.            COVID-19 Vaccine Interest Assessment:   Not interested in receiving COVID-19 vaccine.  If the patient reports an outside immunization, please update the WIR/ICARE information in the Immunizations activity          Mastoid Interpolation Flap Text: A decision was made to reconstruct the defect utilizing an interpolation axial flap and a staged reconstruction.  A telfa template was made of the defect.  This telfa template was then used to outline the mastoid interpolation flap.  The donor area for the pedicle flap was then injected with anesthesia.  The flap was excised through the skin and subcutaneous tissue down to the layer of the underlying musculature.  The pedicle flap was carefully excised within this deep plane to maintain its blood supply.  The edges of the donor site were undermined.   The donor site was closed in a primary fashion.  The pedicle was then rotated into position and sutured.  Once the tube was sutured into place, adequate blood supply was confirmed with blanching and refill.  The pedicle was then wrapped with xeroform gauze and dressed appropriately with a telfa and gauze bandage to ensure continued blood supply and protect the attached pedicle.

## 2025-05-30 ENCOUNTER — TELEPHONE (OUTPATIENT)
Dept: INTERNAL MEDICINE | Facility: CLINIC | Age: 85
End: 2025-05-30
Payer: COMMERCIAL

## 2025-05-30 DIAGNOSIS — H91.90 DECREASED HEARING, UNSPECIFIED LATERALITY: Primary | ICD-10-CM

## 2025-05-30 NOTE — TELEPHONE ENCOUNTER
Order/Referral Request    Who is requesting: Vashti     Orders being requested: ENT    Reason service is needed/diagnosis: bee told she can't hear well    When are orders needed by: no rush    Has this been discussed with Provider: Yes    Does patient have a preference on a Group/Provider/Facility?  Close to [place] and that [facility] will cover    Does patient have an appointment scheduled?: No    Where to send orders: Place orders within Epic    Could we send this information to you in [facility] or would you prefer to receive a phone call?:   Patient would prefer a phone call   Okay to leave a detailed message?: Yes at Home number on file [phone] (home)

## 2025-06-02 ENCOUNTER — APPOINTMENT (OUTPATIENT)
Dept: URBAN - METROPOLITAN AREA CLINIC 253 | Age: 85
Setting detail: DERMATOLOGY
End: 2025-06-02

## 2025-06-02 VITALS — HEIGHT: 60 IN | WEIGHT: 128 LBS | RESPIRATION RATE: 14 BRPM

## 2025-06-02 DIAGNOSIS — L21.8 OTHER SEBORRHEIC DERMATITIS: ICD-10-CM

## 2025-06-02 DIAGNOSIS — Z71.89 OTHER SPECIFIED COUNSELING: ICD-10-CM

## 2025-06-02 DIAGNOSIS — D18.0 HEMANGIOMA: ICD-10-CM

## 2025-06-02 DIAGNOSIS — D22 MELANOCYTIC NEVI: ICD-10-CM

## 2025-06-02 DIAGNOSIS — L81.4 OTHER MELANIN HYPERPIGMENTATION: ICD-10-CM

## 2025-06-02 DIAGNOSIS — L30.4 ERYTHEMA INTERTRIGO: ICD-10-CM

## 2025-06-02 DIAGNOSIS — L82.1 OTHER SEBORRHEIC KERATOSIS: ICD-10-CM

## 2025-06-02 PROBLEM — D22.5 MELANOCYTIC NEVI OF TRUNK: Status: ACTIVE | Noted: 2025-06-02

## 2025-06-02 PROBLEM — D18.01 HEMANGIOMA OF SKIN AND SUBCUTANEOUS TISSUE: Status: ACTIVE | Noted: 2025-06-02

## 2025-06-02 PROCEDURE — 99213 OFFICE O/P EST LOW 20 MIN: CPT

## 2025-06-02 PROCEDURE — OTHER PRESCRIPTION: OTHER

## 2025-06-02 PROCEDURE — OTHER PRESCRIPTION MEDICATION MANAGEMENT: OTHER

## 2025-06-02 PROCEDURE — OTHER COUNSELING: OTHER

## 2025-06-02 PROCEDURE — OTHER MIPS QUALITY: OTHER

## 2025-06-02 RX ORDER — NYSTATIN 100000 [USP'U]/G
POWDER TOPICAL TID
Qty: 60 | Refills: 11 | Status: ERX

## 2025-06-02 RX ORDER — FLUOCINOLONE ACETONIDE 0.11 MG/ML
OIL TOPICAL BID
Qty: 118.28 | Refills: 3 | Status: ERX | COMMUNITY
Start: 2025-06-02

## 2025-06-02 ASSESSMENT — LOCATION SIMPLE DESCRIPTION DERM
LOCATION SIMPLE: LOWER BACK
LOCATION SIMPLE: RIGHT BREAST
LOCATION SIMPLE: UPPER BACK
LOCATION SIMPLE: SCALP
LOCATION SIMPLE: LEFT BREAST

## 2025-06-02 ASSESSMENT — LOCATION ZONE DERM
LOCATION ZONE: SCALP
LOCATION ZONE: TRUNK

## 2025-06-02 ASSESSMENT — LOCATION DETAILED DESCRIPTION DERM
LOCATION DETAILED: LEFT SUPERIOR PARIETAL SCALP
LOCATION DETAILED: LEFT MEDIAL BREAST 6-7:00 REGION
LOCATION DETAILED: RIGHT LATERAL BREAST 6-7:00 REGION
LOCATION DETAILED: SUPERIOR LUMBAR SPINE
LOCATION DETAILED: INFERIOR THORACIC SPINE

## 2025-06-09 ENCOUNTER — PATIENT OUTREACH (OUTPATIENT)
Dept: CARE COORDINATION | Facility: CLINIC | Age: 85
End: 2025-06-09
Payer: COMMERCIAL

## 2025-08-26 ENCOUNTER — TELEPHONE (OUTPATIENT)
Dept: INTERNAL MEDICINE | Facility: CLINIC | Age: 85
End: 2025-08-26
Payer: COMMERCIAL